# Patient Record
Sex: MALE | Race: WHITE | NOT HISPANIC OR LATINO | Employment: OTHER | URBAN - METROPOLITAN AREA
[De-identification: names, ages, dates, MRNs, and addresses within clinical notes are randomized per-mention and may not be internally consistent; named-entity substitution may affect disease eponyms.]

---

## 2018-01-24 ENCOUNTER — OFFICE VISIT CONVERTED (OUTPATIENT)
Dept: FAMILY MEDICINE CLINIC | Facility: CLINIC | Age: 68
End: 2018-01-24
Attending: NURSE PRACTITIONER

## 2018-02-02 ENCOUNTER — OFFICE VISIT CONVERTED (OUTPATIENT)
Dept: FAMILY MEDICINE CLINIC | Facility: CLINIC | Age: 68
End: 2018-02-02
Attending: FAMILY MEDICINE

## 2018-03-09 ENCOUNTER — OFFICE VISIT CONVERTED (OUTPATIENT)
Dept: FAMILY MEDICINE CLINIC | Facility: CLINIC | Age: 68
End: 2018-03-09
Attending: FAMILY MEDICINE

## 2018-04-03 ENCOUNTER — OFFICE VISIT CONVERTED (OUTPATIENT)
Dept: NEUROSURGERY | Facility: CLINIC | Age: 68
End: 2018-04-03
Attending: PHYSICIAN ASSISTANT

## 2018-05-01 ENCOUNTER — OFFICE VISIT CONVERTED (OUTPATIENT)
Dept: NEUROLOGY | Facility: CLINIC | Age: 68
End: 2018-05-01
Attending: PSYCHIATRY & NEUROLOGY

## 2018-05-10 ENCOUNTER — OFFICE VISIT CONVERTED (OUTPATIENT)
Dept: NEUROSURGERY | Facility: CLINIC | Age: 68
End: 2018-05-10
Attending: PHYSICIAN ASSISTANT

## 2018-05-10 ENCOUNTER — CONVERSION ENCOUNTER (OUTPATIENT)
Dept: NEUROLOGY | Facility: CLINIC | Age: 68
End: 2018-05-10

## 2018-07-11 ENCOUNTER — OFFICE VISIT CONVERTED (OUTPATIENT)
Dept: PULMONOLOGY | Facility: CLINIC | Age: 68
End: 2018-07-11
Attending: INTERNAL MEDICINE

## 2018-07-13 ENCOUNTER — OFFICE VISIT CONVERTED (OUTPATIENT)
Dept: FAMILY MEDICINE CLINIC | Facility: CLINIC | Age: 68
End: 2018-07-13
Attending: NURSE PRACTITIONER

## 2018-07-13 ENCOUNTER — CONVERSION ENCOUNTER (OUTPATIENT)
Dept: FAMILY MEDICINE CLINIC | Facility: CLINIC | Age: 68
End: 2018-07-13

## 2018-09-05 ENCOUNTER — OFFICE VISIT CONVERTED (OUTPATIENT)
Dept: PULMONOLOGY | Facility: CLINIC | Age: 68
End: 2018-09-05
Attending: INTERNAL MEDICINE

## 2018-10-15 ENCOUNTER — CONVERSION ENCOUNTER (OUTPATIENT)
Dept: FAMILY MEDICINE CLINIC | Facility: CLINIC | Age: 68
End: 2018-10-15

## 2018-10-15 ENCOUNTER — OFFICE VISIT CONVERTED (OUTPATIENT)
Dept: FAMILY MEDICINE CLINIC | Facility: CLINIC | Age: 68
End: 2018-10-15
Attending: NURSE PRACTITIONER

## 2018-10-18 ENCOUNTER — OFFICE VISIT CONVERTED (OUTPATIENT)
Dept: NEUROSURGERY | Facility: CLINIC | Age: 68
End: 2018-10-18
Attending: PHYSICIAN ASSISTANT

## 2018-12-19 ENCOUNTER — OFFICE VISIT CONVERTED (OUTPATIENT)
Dept: PULMONOLOGY | Facility: CLINIC | Age: 68
End: 2018-12-19
Attending: INTERNAL MEDICINE

## 2019-01-14 ENCOUNTER — HOSPITAL ENCOUNTER (OUTPATIENT)
Dept: LAB | Facility: HOSPITAL | Age: 69
Discharge: HOME | End: 2019-01-14
Attending: INTERNAL MEDICINE

## 2019-01-14 LAB
ALBUMIN SERPL-MCNC: 4.4 G/DL (ref 3.5–5)
ALBUMIN/GLOB SERPL: 1.3 {RATIO} (ref 1.4–2.6)
ALP SERPL-CCNC: 74 U/L (ref 56–155)
ALT SERPL-CCNC: 15 U/L (ref 10–40)
ANION GAP SERPL CALC-SCNC: 17 MMOL/L (ref 8–19)
AST SERPL-CCNC: 20 U/L (ref 15–50)
BILIRUB SERPL-MCNC: 0.29 MG/DL (ref 0.2–1.3)
BUN SERPL-MCNC: 10 MG/DL (ref 5–25)
BUN/CREAT SERPL: 11 {RATIO} (ref 6–20)
CALCIUM SERPL-MCNC: 9.6 MG/DL (ref 8.7–10.4)
CHLORIDE SERPL-SCNC: 88 MMOL/L (ref 99–111)
CHOLEST SERPL-MCNC: 197 MG/DL (ref 107–200)
CHOLEST/HDLC SERPL: 5.1 {RATIO} (ref 3–6)
CONV CO2: 25 MMOL/L (ref 22–32)
CONV TOTAL PROTEIN: 7.9 G/DL (ref 6.3–8.2)
CREAT UR-MCNC: 0.94 MG/DL (ref 0.7–1.2)
GFR SERPLBLD BASED ON 1.73 SQ M-ARVRAT: >60 ML/MIN/{1.73_M2}
GLOBULIN UR ELPH-MCNC: 3.5 G/DL (ref 2–3.5)
GLUCOSE SERPL-MCNC: 88 MG/DL (ref 70–99)
HDLC SERPL-MCNC: 39 MG/DL (ref 40–60)
LDLC SERPL CALC-MCNC: 129 MG/DL (ref 70–100)
OSMOLALITY SERPL CALC.SUM OF ELEC: 258 MOSM/KG (ref 273–304)
POTASSIUM SERPL-SCNC: 4.8 MMOL/L (ref 3.5–5.3)
SODIUM SERPL-SCNC: 125 MMOL/L (ref 135–147)
TRIGL SERPL-MCNC: 146 MG/DL (ref 40–150)
VLDLC SERPL-MCNC: 29 MG/DL (ref 5–37)

## 2019-01-16 ENCOUNTER — CONVERSION ENCOUNTER (OUTPATIENT)
Dept: FAMILY MEDICINE CLINIC | Facility: CLINIC | Age: 69
End: 2019-01-16

## 2019-01-16 ENCOUNTER — OFFICE VISIT CONVERTED (OUTPATIENT)
Dept: FAMILY MEDICINE CLINIC | Facility: CLINIC | Age: 69
End: 2019-01-16
Attending: NURSE PRACTITIONER

## 2019-03-14 ENCOUNTER — HOSPITAL ENCOUNTER (OUTPATIENT)
Dept: LAB | Facility: HOSPITAL | Age: 69
Discharge: HOME | End: 2019-03-14
Attending: NURSE PRACTITIONER

## 2019-03-14 ENCOUNTER — CONVERSION ENCOUNTER (OUTPATIENT)
Dept: FAMILY MEDICINE CLINIC | Facility: CLINIC | Age: 69
End: 2019-03-14

## 2019-03-14 ENCOUNTER — OFFICE VISIT CONVERTED (OUTPATIENT)
Dept: FAMILY MEDICINE CLINIC | Facility: CLINIC | Age: 69
End: 2019-03-14
Attending: NURSE PRACTITIONER

## 2019-03-14 LAB
25(OH)D3 SERPL-MCNC: 67.9 NG/ML (ref 30–100)
ALBUMIN SERPL-MCNC: 4.4 G/DL (ref 3.5–5)
ALBUMIN/GLOB SERPL: 1.2 {RATIO} (ref 1.4–2.6)
ALP SERPL-CCNC: 81 U/L (ref 56–155)
ALT SERPL-CCNC: 17 U/L (ref 10–40)
ANION GAP SERPL CALC-SCNC: 19 MMOL/L (ref 8–19)
AST SERPL-CCNC: 22 U/L (ref 15–50)
BASOPHILS # BLD AUTO: 0.09 10*3/UL (ref 0–0.2)
BASOPHILS NFR BLD AUTO: 0.7 % (ref 0–3)
BILIRUB SERPL-MCNC: 0.33 MG/DL (ref 0.2–1.3)
BUN SERPL-MCNC: 15 MG/DL (ref 5–25)
BUN/CREAT SERPL: 13 {RATIO} (ref 6–20)
CALCIUM SERPL-MCNC: 9.4 MG/DL (ref 8.7–10.4)
CHLORIDE SERPL-SCNC: 84 MMOL/L (ref 99–111)
CHOLEST SERPL-MCNC: 221 MG/DL (ref 107–200)
CHOLEST/HDLC SERPL: 6.5 {RATIO} (ref 3–6)
CONV ABS IMM GRAN: 0.09 10*3/UL (ref 0–0.2)
CONV CO2: 22 MMOL/L (ref 22–32)
CONV IMMATURE GRAN: 0.7 % (ref 0–1.8)
CONV TOTAL PROTEIN: 8 G/DL (ref 6.3–8.2)
CREAT UR-MCNC: 1.15 MG/DL (ref 0.7–1.2)
DEPRECATED RDW RBC AUTO: 42.8 FL (ref 35.1–43.9)
EOSINOPHIL # BLD AUTO: 0.56 10*3/UL (ref 0–0.7)
EOSINOPHIL # BLD AUTO: 4.1 % (ref 0–7)
ERYTHROCYTE [DISTWIDTH] IN BLOOD BY AUTOMATED COUNT: 13.1 % (ref 11.6–14.4)
EST. AVERAGE GLUCOSE BLD GHB EST-MCNC: 114 MG/DL
FOLATE SERPL-MCNC: 4.8 NG/ML (ref 4.8–20)
GFR SERPLBLD BASED ON 1.73 SQ M-ARVRAT: >60 ML/MIN/{1.73_M2}
GLOBULIN UR ELPH-MCNC: 3.6 G/DL (ref 2–3.5)
GLUCOSE SERPL-MCNC: 97 MG/DL (ref 70–99)
HBA1C MFR BLD: 14.7 G/DL (ref 14–18)
HBA1C MFR BLD: 5.6 % (ref 3.5–5.7)
HCT VFR BLD AUTO: 42.3 % (ref 42–52)
HDLC SERPL-MCNC: 34 MG/DL (ref 40–60)
LDLC SERPL CALC-MCNC: 124 MG/DL (ref 70–100)
LYMPHOCYTES # BLD AUTO: 1.98 10*3/UL (ref 1–5)
MCH RBC QN AUTO: 30.9 PG (ref 27–31)
MCHC RBC AUTO-ENTMCNC: 34.8 G/DL (ref 33–37)
MCV RBC AUTO: 88.9 FL (ref 80–96)
MONOCYTES # BLD AUTO: 1.46 10*3/UL (ref 0.2–1.2)
MONOCYTES NFR BLD AUTO: 10.6 % (ref 3–10)
NEUTROPHILS # BLD AUTO: 9.55 10*3/UL (ref 2–8)
NEUTROPHILS NFR BLD AUTO: 69.5 % (ref 30–85)
NRBC CBCN: 0 % (ref 0–0.7)
OSMOLALITY SERPL CALC.SUM OF ELEC: 253 MOSM/KG (ref 273–304)
PLATELET # BLD AUTO: 360 10*3/UL (ref 130–400)
PMV BLD AUTO: 8.6 FL (ref 9.4–12.4)
POTASSIUM SERPL-SCNC: 4.3 MMOL/L (ref 3.5–5.3)
RBC # BLD AUTO: 4.76 10*6/UL (ref 4.7–6.1)
SODIUM SERPL-SCNC: 121 MMOL/L (ref 135–147)
TRIGL SERPL-MCNC: 316 MG/DL (ref 40–150)
TSH SERPL-ACNC: 2.59 M[IU]/L (ref 0.27–4.2)
VARIANT LYMPHS NFR BLD MANUAL: 14.4 % (ref 20–45)
VIT B12 SERPL-MCNC: 1464 PG/ML (ref 211–911)
VLDLC SERPL-MCNC: 63 MG/DL (ref 5–37)
WBC # BLD AUTO: 13.73 10*3/UL (ref 4.8–10.8)

## 2019-03-25 ENCOUNTER — HOSPITAL ENCOUNTER (OUTPATIENT)
Dept: LAB | Facility: HOSPITAL | Age: 69
Discharge: HOME | End: 2019-03-25
Attending: INTERNAL MEDICINE

## 2019-03-25 LAB
ANION GAP SERPL CALC-SCNC: 20 MMOL/L (ref 8–19)
BUN SERPL-MCNC: 13 MG/DL (ref 5–25)
BUN/CREAT SERPL: 13 {RATIO} (ref 6–20)
CALCIUM SERPL-MCNC: 9.4 MG/DL (ref 8.7–10.4)
CHLORIDE SERPL-SCNC: 99 MMOL/L (ref 99–111)
CONV CO2: 23 MMOL/L (ref 22–32)
CREAT UR-MCNC: 1.04 MG/DL (ref 0.7–1.2)
GFR SERPLBLD BASED ON 1.73 SQ M-ARVRAT: >60 ML/MIN/{1.73_M2}
GLUCOSE SERPL-MCNC: 102 MG/DL (ref 70–99)
OSMOLALITY SERPL CALC.SUM OF ELEC: 284 MOSM/KG (ref 273–304)
POTASSIUM SERPL-SCNC: 4.5 MMOL/L (ref 3.5–5.3)
SODIUM SERPL-SCNC: 137 MMOL/L (ref 135–147)

## 2019-04-03 ENCOUNTER — OFFICE VISIT CONVERTED (OUTPATIENT)
Dept: FAMILY MEDICINE CLINIC | Facility: CLINIC | Age: 69
End: 2019-04-03
Attending: NURSE PRACTITIONER

## 2019-04-16 ENCOUNTER — OFFICE VISIT CONVERTED (OUTPATIENT)
Dept: FAMILY MEDICINE CLINIC | Facility: CLINIC | Age: 69
End: 2019-04-16
Attending: NURSE PRACTITIONER

## 2019-04-16 ENCOUNTER — CONVERSION ENCOUNTER (OUTPATIENT)
Dept: FAMILY MEDICINE CLINIC | Facility: CLINIC | Age: 69
End: 2019-04-16

## 2019-06-11 ENCOUNTER — OFFICE VISIT CONVERTED (OUTPATIENT)
Dept: PULMONOLOGY | Facility: CLINIC | Age: 69
End: 2019-06-11
Attending: PHYSICIAN ASSISTANT

## 2019-06-14 ENCOUNTER — HOSPITAL ENCOUNTER (OUTPATIENT)
Dept: LAB | Facility: HOSPITAL | Age: 69
Discharge: HOME | End: 2019-06-14
Attending: INTERNAL MEDICINE

## 2019-06-14 LAB
ANION GAP SERPL CALC-SCNC: 17 MMOL/L (ref 8–19)
BUN SERPL-MCNC: 14 MG/DL (ref 5–25)
BUN/CREAT SERPL: 11 {RATIO} (ref 6–20)
CALCIUM SERPL-MCNC: 9.4 MG/DL (ref 8.7–10.4)
CHLORIDE SERPL-SCNC: 99 MMOL/L (ref 99–111)
CONV CO2: 26 MMOL/L (ref 22–32)
CREAT UR-MCNC: 1.25 MG/DL (ref 0.7–1.2)
GFR SERPLBLD BASED ON 1.73 SQ M-ARVRAT: 59 ML/MIN/{1.73_M2}
GLUCOSE SERPL-MCNC: 107 MG/DL (ref 70–99)
OSMOLALITY SERPL CALC.SUM OF ELEC: 285 MOSM/KG (ref 273–304)
POTASSIUM SERPL-SCNC: 4.5 MMOL/L (ref 3.5–5.3)
SODIUM SERPL-SCNC: 137 MMOL/L (ref 135–147)

## 2019-06-27 ENCOUNTER — HOSPITAL ENCOUNTER (OUTPATIENT)
Dept: CARDIOLOGY | Facility: HOSPITAL | Age: 69
Discharge: HOME | End: 2019-06-27
Attending: PHYSICIAN ASSISTANT

## 2019-07-29 ENCOUNTER — OFFICE VISIT CONVERTED (OUTPATIENT)
Dept: FAMILY MEDICINE CLINIC | Facility: CLINIC | Age: 69
End: 2019-07-29
Attending: NURSE PRACTITIONER

## 2019-08-07 ENCOUNTER — OFFICE VISIT CONVERTED (OUTPATIENT)
Dept: PULMONOLOGY | Facility: CLINIC | Age: 69
End: 2019-08-07
Attending: INTERNAL MEDICINE

## 2019-08-30 ENCOUNTER — PATIENT OUTREACH - CONVERTED (OUTPATIENT)
Dept: FAMILY MEDICINE CLINIC | Facility: CLINIC | Age: 69
End: 2019-08-30
Attending: NURSE PRACTITIONER

## 2019-09-06 ENCOUNTER — HOSPITAL ENCOUNTER (OUTPATIENT)
Dept: PULMONOLOGY | Facility: CLINIC | Age: 69
Setting detail: RECURRING SERIES
Discharge: HOME | End: 2019-11-19
Attending: INTERNAL MEDICINE

## 2019-09-30 ENCOUNTER — PATIENT OUTREACH - CONVERTED (OUTPATIENT)
Dept: FAMILY MEDICINE CLINIC | Facility: CLINIC | Age: 69
End: 2019-09-30
Attending: NURSE PRACTITIONER

## 2019-10-08 ENCOUNTER — HOSPITAL ENCOUNTER (OUTPATIENT)
Dept: LAB | Facility: HOSPITAL | Age: 69
Discharge: HOME | End: 2019-10-08
Attending: NURSE PRACTITIONER

## 2019-10-08 ENCOUNTER — HOSPITAL ENCOUNTER (OUTPATIENT)
Dept: CT IMAGING | Facility: HOSPITAL | Age: 69
Discharge: HOME | End: 2019-10-08
Attending: INTERNAL MEDICINE

## 2019-10-08 LAB
25(OH)D3 SERPL-MCNC: 57.6 NG/ML (ref 30–100)
ALBUMIN SERPL-MCNC: 4.5 G/DL (ref 3.5–5)
ALBUMIN/GLOB SERPL: 1.4 {RATIO} (ref 1.4–2.6)
ALP SERPL-CCNC: 79 U/L (ref 56–155)
ALT SERPL-CCNC: 14 U/L (ref 10–40)
ANION GAP SERPL CALC-SCNC: 19 MMOL/L (ref 8–19)
AST SERPL-CCNC: 16 U/L (ref 15–50)
BASOPHILS # BLD AUTO: 0.05 10*3/UL (ref 0–0.2)
BASOPHILS NFR BLD AUTO: 0.6 % (ref 0–3)
BILIRUB SERPL-MCNC: 0.5 MG/DL (ref 0.2–1.3)
BUN SERPL-MCNC: 13 MG/DL (ref 5–25)
BUN/CREAT SERPL: 12 {RATIO} (ref 6–20)
CALCIUM SERPL-MCNC: 9.7 MG/DL (ref 8.7–10.4)
CHLORIDE SERPL-SCNC: 95 MMOL/L (ref 99–111)
CHOLEST SERPL-MCNC: 202 MG/DL (ref 107–200)
CHOLEST/HDLC SERPL: 5.8 {RATIO} (ref 3–6)
CONV ABS IMM GRAN: 0.03 10*3/UL (ref 0–0.2)
CONV CO2: 21 MMOL/L (ref 22–32)
CONV IMMATURE GRAN: 0.3 % (ref 0–1.8)
CONV TOTAL PROTEIN: 7.8 G/DL (ref 6.3–8.2)
CREAT UR-MCNC: 1.1 MG/DL (ref 0.7–1.2)
DEPRECATED RDW RBC AUTO: 43.4 FL (ref 35.1–43.9)
EOSINOPHIL # BLD AUTO: 0.21 10*3/UL (ref 0–0.7)
EOSINOPHIL # BLD AUTO: 2.4 % (ref 0–7)
ERYTHROCYTE [DISTWIDTH] IN BLOOD BY AUTOMATED COUNT: 13 % (ref 11.6–14.4)
GFR SERPLBLD BASED ON 1.73 SQ M-ARVRAT: >60 ML/MIN/{1.73_M2}
GLOBULIN UR ELPH-MCNC: 3.3 G/DL (ref 2–3.5)
GLUCOSE SERPL-MCNC: 91 MG/DL (ref 70–99)
HCT VFR BLD AUTO: 43.6 % (ref 42–52)
HDLC SERPL-MCNC: 35 MG/DL (ref 40–60)
HGB BLD-MCNC: 14.5 G/DL (ref 14–18)
LDLC SERPL CALC-MCNC: 142 MG/DL (ref 70–100)
LYMPHOCYTES # BLD AUTO: 1.26 10*3/UL (ref 1–5)
LYMPHOCYTES NFR BLD AUTO: 14.6 % (ref 20–45)
MCH RBC QN AUTO: 30.3 PG (ref 27–31)
MCHC RBC AUTO-ENTMCNC: 33.3 G/DL (ref 33–37)
MCV RBC AUTO: 91 FL (ref 80–96)
MONOCYTES # BLD AUTO: 0.7 10*3/UL (ref 0.2–1.2)
MONOCYTES NFR BLD AUTO: 8.1 % (ref 3–10)
NEUTROPHILS # BLD AUTO: 6.39 10*3/UL (ref 2–8)
NEUTROPHILS NFR BLD AUTO: 74 % (ref 30–85)
NRBC CBCN: 0 % (ref 0–0.7)
OSMOLALITY SERPL CALC.SUM OF ELEC: 272 MOSM/KG (ref 273–304)
PLATELET # BLD AUTO: 277 10*3/UL (ref 130–400)
PMV BLD AUTO: 9.3 FL (ref 9.4–12.4)
POTASSIUM SERPL-SCNC: 4.1 MMOL/L (ref 3.5–5.3)
RBC # BLD AUTO: 4.79 10*6/UL (ref 4.7–6.1)
SODIUM SERPL-SCNC: 131 MMOL/L (ref 135–147)
TRIGL SERPL-MCNC: 126 MG/DL (ref 40–150)
VLDLC SERPL-MCNC: 25 MG/DL (ref 5–37)
WBC # BLD AUTO: 8.64 10*3/UL (ref 4.8–10.8)

## 2019-10-09 LAB
APPEARANCE UR: CLEAR
BILIRUB UR QL: NEGATIVE
COLOR UR: YELLOW
CONV COLLECTION SOURCE (UA): NORMAL
CONV UROBILINOGEN IN URINE BY AUTOMATED TEST STRIP: 0.2 {EHRLICHU}/DL (ref 0.1–1)
GLUCOSE UR QL: NEGATIVE MG/DL
HGB UR QL STRIP: NEGATIVE
KETONES UR QL STRIP: NEGATIVE MG/DL
LEUKOCYTE ESTERASE UR QL STRIP: NEGATIVE
NITRITE UR QL STRIP: NEGATIVE
PH UR STRIP.AUTO: 7.5 [PH] (ref 5–8)
PROT UR QL: NEGATIVE MG/DL
SP GR UR: 1.01 (ref 1–1.03)

## 2019-10-14 ENCOUNTER — CONVERSION ENCOUNTER (OUTPATIENT)
Dept: ORTHOPEDIC SURGERY | Facility: CLINIC | Age: 69
End: 2019-10-14

## 2019-10-14 ENCOUNTER — OFFICE VISIT CONVERTED (OUTPATIENT)
Dept: ORTHOPEDIC SURGERY | Facility: CLINIC | Age: 69
End: 2019-10-14
Attending: ORTHOPAEDIC SURGERY

## 2019-10-18 ENCOUNTER — HOSPITAL ENCOUNTER (OUTPATIENT)
Dept: GASTROENTEROLOGY | Facility: HOSPITAL | Age: 69
Setting detail: HOSPITAL OUTPATIENT SURGERY
Discharge: HOME | End: 2019-10-18
Attending: INTERNAL MEDICINE

## 2019-10-18 LAB
EOSINOPHIL NFR FLD MANUAL: 8 %
EPI CELLS NFR FLD: 0 %
LYMPHOCYTES NFR FLD MANUAL: 26 %
MACROPHAGE FLUID: 24 /100{WBCS}
NEUTROPHILS NFR FLD MANUAL: 42 %
VISUAL PRESENCE OF BLOOD: NORMAL

## 2019-10-22 ENCOUNTER — HOSPITAL ENCOUNTER (OUTPATIENT)
Dept: LAB | Facility: HOSPITAL | Age: 69
Discharge: HOME | End: 2019-10-22
Attending: NURSE PRACTITIONER

## 2019-10-22 LAB
ALBUMIN SERPL-MCNC: 4.3 G/DL (ref 3.5–5)
ALBUMIN/GLOB SERPL: 1.4 {RATIO} (ref 1.4–2.6)
ALP SERPL-CCNC: 76 U/L (ref 56–155)
ALT SERPL-CCNC: 13 U/L (ref 10–40)
ANION GAP SERPL CALC-SCNC: 18 MMOL/L (ref 8–19)
AST SERPL-CCNC: 15 U/L (ref 15–50)
BACTERIA SPEC AEROBE CULT: ABNORMAL
BILIRUB SERPL-MCNC: 0.35 MG/DL (ref 0.2–1.3)
BUN SERPL-MCNC: 11 MG/DL (ref 5–25)
BUN/CREAT SERPL: 10 {RATIO} (ref 6–20)
CALCIUM SERPL-MCNC: 9.4 MG/DL (ref 8.7–10.4)
CHLORIDE SERPL-SCNC: 98 MMOL/L (ref 99–111)
CIPROFLOXACIN SUSC ISLT: <=0.5
CLINDAMYCIN SUSC ISLT: 0.25
CONV BRONCHIAL WASH CULTURE: ABNORMAL
CONV CO2: 22 MMOL/L (ref 22–32)
CONV TOTAL PROTEIN: 7.4 G/DL (ref 6.3–8.2)
CREAT UR-MCNC: 1.07 MG/DL (ref 0.7–1.2)
DOXYCYCLINE SUSC ISLT: <=0.5
ERYTHROMYCIN SUSC ISLT: >=8
GENTAMICIN SUSC ISLT: <=0.5
GFR SERPLBLD BASED ON 1.73 SQ M-ARVRAT: >60 ML/MIN/{1.73_M2}
GLOBULIN UR ELPH-MCNC: 3.1 G/DL (ref 2–3.5)
GLUCOSE SERPL-MCNC: 99 MG/DL (ref 70–99)
LEVOFLOXACIN SUSC ISLT: 0.25
OSMOLALITY SERPL CALC.SUM OF ELEC: 277 MOSM/KG (ref 273–304)
OXACILLIN SUSC ISLT: 1
POTASSIUM SERPL-SCNC: 3.9 MMOL/L (ref 3.5–5.3)
RIFAMPIN SUSC ISLT: <=0.5
SODIUM SERPL-SCNC: 134 MMOL/L (ref 135–147)
TETRACYCLINE SUSC ISLT: <=1
TMP SMX SUSC ISLT: <=10
VANCOMYCIN SUSC ISLT: 1

## 2019-10-23 ENCOUNTER — OFFICE VISIT CONVERTED (OUTPATIENT)
Dept: FAMILY MEDICINE CLINIC | Facility: CLINIC | Age: 69
End: 2019-10-23
Attending: NURSE PRACTITIONER

## 2019-10-23 LAB
CONV ADENOVIRUS  (BAL OR WASH): NEGATIVE
FLUAV RNA SPEC QL NAA+PROBE: NEGATIVE
FLUBV RNA ISLT QL NAA+PROBE: NEGATIVE
HMPV RNA SPEC QL NAA+PROBE: NEGATIVE
HPIV1 RNA ISLT QL NAA+PROBE: NEGATIVE
HPIV2 SPEC QL CULT: NEGATIVE
HPIV3 SPEC QL CULT: NEGATIVE
RHINOVIRUS RNA SPEC QL NAA+PROBE: NEGATIVE
RSV A: NEGATIVE
RSV B RNA SPEC QL NAA+PROBE: NEGATIVE

## 2019-10-29 ENCOUNTER — HOSPITAL ENCOUNTER (OUTPATIENT)
Dept: PREADMISSION TESTING | Facility: HOSPITAL | Age: 69
Discharge: HOME | End: 2019-10-29
Attending: ORTHOPAEDIC SURGERY

## 2019-10-29 ENCOUNTER — PATIENT OUTREACH - CONVERTED (OUTPATIENT)
Dept: FAMILY MEDICINE CLINIC | Facility: CLINIC | Age: 69
End: 2019-10-29
Attending: NURSE PRACTITIONER

## 2019-10-29 LAB
ALBUMIN SERPL-MCNC: 4.7 G/DL (ref 3.5–5)
ALBUMIN/GLOB SERPL: 1.5 {RATIO} (ref 1.4–2.6)
ALP SERPL-CCNC: 85 U/L (ref 56–155)
ALT SERPL-CCNC: 23 U/L (ref 10–40)
ANION GAP SERPL CALC-SCNC: 17 MMOL/L (ref 8–19)
APTT BLD: 28.8 S (ref 22.2–34.2)
AST SERPL-CCNC: 23 U/L (ref 15–50)
BASOPHILS # BLD AUTO: 0.05 10*3/UL (ref 0–0.2)
BASOPHILS NFR BLD AUTO: 0.5 % (ref 0–3)
BILIRUB SERPL-MCNC: 0.43 MG/DL (ref 0.2–1.3)
BUN SERPL-MCNC: 13 MG/DL (ref 5–25)
BUN/CREAT SERPL: 14 {RATIO} (ref 6–20)
CALCIUM SERPL-MCNC: 9.6 MG/DL (ref 8.7–10.4)
CHLORIDE SERPL-SCNC: 95 MMOL/L (ref 99–111)
CONV ABS IMM GRAN: 0.06 10*3/UL (ref 0–0.2)
CONV CO2: 23 MMOL/L (ref 22–32)
CONV IMMATURE GRAN: 0.6 % (ref 0–1.8)
CONV TOTAL PROTEIN: 7.9 G/DL (ref 6.3–8.2)
CREAT UR-MCNC: 0.9 MG/DL (ref 0.7–1.2)
DEPRECATED RDW RBC AUTO: 43 FL (ref 35.1–43.9)
EOSINOPHIL # BLD AUTO: 0.21 10*3/UL (ref 0–0.7)
EOSINOPHIL # BLD AUTO: 2.1 % (ref 0–7)
ERYTHROCYTE [DISTWIDTH] IN BLOOD BY AUTOMATED COUNT: 13 % (ref 11.6–14.4)
EST. AVERAGE GLUCOSE BLD GHB EST-MCNC: 111 MG/DL
GFR SERPLBLD BASED ON 1.73 SQ M-ARVRAT: >60 ML/MIN/{1.73_M2}
GLOBULIN UR ELPH-MCNC: 3.2 G/DL (ref 2–3.5)
GLUCOSE SERPL-MCNC: 109 MG/DL (ref 70–99)
HBA1C MFR BLD: 5.5 % (ref 3.5–5.7)
HCT VFR BLD AUTO: 46.1 % (ref 42–52)
HGB BLD-MCNC: 15.9 G/DL (ref 14–18)
INR PPP: 0.96 (ref 2–3)
LYMPHOCYTES # BLD AUTO: 1.73 10*3/UL (ref 1–5)
LYMPHOCYTES NFR BLD AUTO: 17.7 % (ref 20–45)
MCH RBC QN AUTO: 31 PG (ref 27–31)
MCHC RBC AUTO-ENTMCNC: 34.5 G/DL (ref 33–37)
MCV RBC AUTO: 89.9 FL (ref 80–96)
MONOCYTES # BLD AUTO: 1.02 10*3/UL (ref 0.2–1.2)
MONOCYTES NFR BLD AUTO: 10.4 % (ref 3–10)
NEUTROPHILS # BLD AUTO: 6.71 10*3/UL (ref 2–8)
NEUTROPHILS NFR BLD AUTO: 68.7 % (ref 30–85)
NRBC CBCN: 0 % (ref 0–0.7)
OSMOLALITY SERPL CALC.SUM OF ELEC: 273 MOSM/KG (ref 273–304)
PLATELET # BLD AUTO: 305 10*3/UL (ref 130–400)
PMV BLD AUTO: 8.9 FL (ref 9.4–12.4)
POTASSIUM SERPL-SCNC: 4.2 MMOL/L (ref 3.5–5.3)
PROTHROMBIN TIME: 10.4 S (ref 9.4–12)
RBC # BLD AUTO: 5.13 10*6/UL (ref 4.7–6.1)
SODIUM SERPL-SCNC: 131 MMOL/L (ref 135–147)
WBC # BLD AUTO: 9.78 10*3/UL (ref 4.8–10.8)

## 2019-10-30 LAB
CONV COMMENT: NORMAL
CONV LEGIONELLA CULTURE: NORMAL

## 2019-11-22 ENCOUNTER — PATIENT OUTREACH - CONVERTED (OUTPATIENT)
Dept: FAMILY MEDICINE CLINIC | Facility: CLINIC | Age: 69
End: 2019-11-22
Attending: NURSE PRACTITIONER

## 2019-11-22 ENCOUNTER — HOSPITAL ENCOUNTER (OUTPATIENT)
Dept: PERIOP | Facility: HOSPITAL | Age: 69
Setting detail: HOSPITAL OUTPATIENT SURGERY
Discharge: HOME | End: 2019-11-23
Attending: FAMILY MEDICINE

## 2019-11-23 LAB
ANION GAP SERPL CALC-SCNC: 15 MMOL/L (ref 8–19)
BUN SERPL-MCNC: 13 MG/DL (ref 5–25)
BUN/CREAT SERPL: 14 {RATIO} (ref 6–20)
CALCIUM SERPL-MCNC: 8.8 MG/DL (ref 8.7–10.4)
CHLORIDE SERPL-SCNC: 99 MMOL/L (ref 99–111)
CONV CO2: 22 MMOL/L (ref 22–32)
CREAT UR-MCNC: 0.94 MG/DL (ref 0.7–1.2)
GFR SERPLBLD BASED ON 1.73 SQ M-ARVRAT: >60 ML/MIN/{1.73_M2}
GLUCOSE SERPL-MCNC: 113 MG/DL (ref 70–99)
HCT VFR BLD AUTO: 37.6 % (ref 42–52)
HGB BLD-MCNC: 12.2 G/DL (ref 14–18)
OSMOLALITY SERPL CALC.SUM OF ELEC: 275 MOSM/KG (ref 273–304)
POTASSIUM SERPL-SCNC: 4.4 MMOL/L (ref 3.5–5.3)
SODIUM SERPL-SCNC: 132 MMOL/L (ref 135–147)

## 2019-11-25 ENCOUNTER — HOSPITAL ENCOUNTER (OUTPATIENT)
Dept: PHYSICAL THERAPY | Facility: CLINIC | Age: 69
Setting detail: RECURRING SERIES
Discharge: STILL ADMITTED OR ADMINISTRATIVE | End: 2020-03-05
Attending: ORTHOPAEDIC SURGERY

## 2019-12-06 ENCOUNTER — OFFICE VISIT CONVERTED (OUTPATIENT)
Dept: ORTHOPEDIC SURGERY | Facility: CLINIC | Age: 69
End: 2019-12-06
Attending: PHYSICIAN ASSISTANT

## 2019-12-23 ENCOUNTER — HOSPITAL ENCOUNTER (OUTPATIENT)
Dept: URGENT CARE | Facility: CLINIC | Age: 69
Discharge: HOME | End: 2019-12-23

## 2019-12-27 ENCOUNTER — PATIENT OUTREACH - CONVERTED (OUTPATIENT)
Dept: FAMILY MEDICINE CLINIC | Facility: CLINIC | Age: 69
End: 2019-12-27
Attending: NURSE PRACTITIONER

## 2020-01-06 ENCOUNTER — OFFICE VISIT CONVERTED (OUTPATIENT)
Dept: ORTHOPEDIC SURGERY | Facility: CLINIC | Age: 70
End: 2020-01-06
Attending: PHYSICIAN ASSISTANT

## 2020-01-06 ENCOUNTER — CONVERSION ENCOUNTER (OUTPATIENT)
Dept: ORTHOPEDIC SURGERY | Facility: CLINIC | Age: 70
End: 2020-01-06

## 2020-01-14 ENCOUNTER — HOSPITAL ENCOUNTER (OUTPATIENT)
Dept: CT IMAGING | Facility: HOSPITAL | Age: 70
Discharge: HOME | End: 2020-01-14
Attending: INTERNAL MEDICINE

## 2020-01-21 ENCOUNTER — OFFICE VISIT CONVERTED (OUTPATIENT)
Dept: PULMONOLOGY | Facility: CLINIC | Age: 70
End: 2020-01-21
Attending: INTERNAL MEDICINE

## 2020-01-23 ENCOUNTER — HOSPITAL ENCOUNTER (OUTPATIENT)
Dept: GENERAL RADIOLOGY | Facility: HOSPITAL | Age: 70
Discharge: HOME | End: 2020-01-23
Attending: INTERNAL MEDICINE

## 2020-01-23 LAB
CREAT BLD-MCNC: 1.1 MG/DL (ref 0.6–1.4)
GFR SERPLBLD BASED ON 1.73 SQ M-ARVRAT: >60 ML/MIN/{1.73_M2}

## 2020-01-29 ENCOUNTER — OFFICE VISIT CONVERTED (OUTPATIENT)
Dept: FAMILY MEDICINE CLINIC | Facility: CLINIC | Age: 70
End: 2020-01-29
Attending: NURSE PRACTITIONER

## 2020-01-29 ENCOUNTER — CONVERSION ENCOUNTER (OUTPATIENT)
Dept: FAMILY MEDICINE CLINIC | Facility: CLINIC | Age: 70
End: 2020-01-29

## 2020-02-17 ENCOUNTER — OFFICE VISIT CONVERTED (OUTPATIENT)
Dept: ORTHOPEDIC SURGERY | Facility: CLINIC | Age: 70
End: 2020-02-17
Attending: PHYSICIAN ASSISTANT

## 2020-02-27 ENCOUNTER — HOSPITAL ENCOUNTER (OUTPATIENT)
Dept: LAB | Facility: HOSPITAL | Age: 70
Discharge: HOME | End: 2020-02-27
Attending: NURSE PRACTITIONER

## 2020-02-27 LAB
ALBUMIN SERPL-MCNC: 4.5 G/DL (ref 3.5–5)
ALBUMIN/GLOB SERPL: 1.2 {RATIO} (ref 1.4–2.6)
ALP SERPL-CCNC: 80 U/L (ref 56–155)
ALT SERPL-CCNC: 13 U/L (ref 10–40)
ANION GAP SERPL CALC-SCNC: 21 MMOL/L (ref 8–19)
APPEARANCE UR: CLEAR
AST SERPL-CCNC: 14 U/L (ref 15–50)
BASOPHILS # BLD AUTO: 0.17 10*3/UL (ref 0–0.2)
BASOPHILS NFR BLD AUTO: 1.9 % (ref 0–3)
BILIRUB SERPL-MCNC: 0.18 MG/DL (ref 0.2–1.3)
BILIRUB UR QL: NEGATIVE
BUN SERPL-MCNC: 18 MG/DL (ref 5–25)
BUN/CREAT SERPL: 14 {RATIO} (ref 6–20)
CALCIUM SERPL-MCNC: 9.5 MG/DL (ref 8.7–10.4)
CHLORIDE SERPL-SCNC: 95 MMOL/L (ref 99–111)
CHOLEST SERPL-MCNC: 209 MG/DL (ref 107–200)
CHOLEST/HDLC SERPL: 6 {RATIO} (ref 3–6)
COLOR UR: YELLOW
CONV ABS IMM GRAN: 0.03 10*3/UL (ref 0–0.2)
CONV CO2: 21 MMOL/L (ref 22–32)
CONV COLLECTION SOURCE (UA): NORMAL
CONV IMMATURE GRAN: 0.3 % (ref 0–1.8)
CONV TOTAL PROTEIN: 8.3 G/DL (ref 6.3–8.2)
CONV UROBILINOGEN IN URINE BY AUTOMATED TEST STRIP: 0.2 {EHRLICHU}/DL (ref 0.1–1)
CREAT UR-MCNC: 1.26 MG/DL (ref 0.7–1.2)
DEPRECATED RDW RBC AUTO: 45.4 FL (ref 35.1–43.9)
EOSINOPHIL # BLD AUTO: 0.51 10*3/UL (ref 0–0.7)
EOSINOPHIL # BLD AUTO: 5.7 % (ref 0–7)
ERYTHROCYTE [DISTWIDTH] IN BLOOD BY AUTOMATED COUNT: 13.3 % (ref 11.6–14.4)
GFR SERPLBLD BASED ON 1.73 SQ M-ARVRAT: 59 ML/MIN/{1.73_M2}
GLOBULIN UR ELPH-MCNC: 3.8 G/DL (ref 2–3.5)
GLUCOSE SERPL-MCNC: 95 MG/DL (ref 70–99)
GLUCOSE UR QL: NEGATIVE MG/DL
HCT VFR BLD AUTO: 45.4 % (ref 42–52)
HDLC SERPL-MCNC: 35 MG/DL (ref 40–60)
HGB BLD-MCNC: 14.5 G/DL (ref 14–18)
HGB UR QL STRIP: NEGATIVE
KETONES UR QL STRIP: NEGATIVE MG/DL
LDLC SERPL CALC-MCNC: 138 MG/DL (ref 70–100)
LEUKOCYTE ESTERASE UR QL STRIP: NEGATIVE
LYMPHOCYTES # BLD AUTO: 2.31 10*3/UL (ref 1–5)
LYMPHOCYTES NFR BLD AUTO: 25.9 % (ref 20–45)
MCH RBC QN AUTO: 29.6 PG (ref 27–31)
MCHC RBC AUTO-ENTMCNC: 31.9 G/DL (ref 33–37)
MCV RBC AUTO: 92.7 FL (ref 80–96)
MONOCYTES # BLD AUTO: 0.88 10*3/UL (ref 0.2–1.2)
MONOCYTES NFR BLD AUTO: 9.9 % (ref 3–10)
NEUTROPHILS # BLD AUTO: 5.02 10*3/UL (ref 2–8)
NEUTROPHILS NFR BLD AUTO: 56.3 % (ref 30–85)
NITRITE UR QL STRIP: NEGATIVE
NRBC CBCN: 0 % (ref 0–0.7)
OSMOLALITY SERPL CALC.SUM OF ELEC: 278 MOSM/KG (ref 273–304)
PH UR STRIP.AUTO: 6.5 [PH] (ref 5–8)
PLATELET # BLD AUTO: 321 10*3/UL (ref 130–400)
PMV BLD AUTO: 9.9 FL (ref 9.4–12.4)
POTASSIUM SERPL-SCNC: 4.2 MMOL/L (ref 3.5–5.3)
PROT UR QL: NEGATIVE MG/DL
PSA SERPL-MCNC: 0.85 NG/ML (ref 0–4)
RBC # BLD AUTO: 4.9 10*6/UL (ref 4.7–6.1)
SODIUM SERPL-SCNC: 133 MMOL/L (ref 135–147)
SP GR UR: 1.01 (ref 1–1.03)
T4 FREE SERPL-MCNC: 1.6 NG/DL (ref 0.9–1.8)
TRIGL SERPL-MCNC: 179 MG/DL (ref 40–150)
TSH SERPL-ACNC: 3.2 M[IU]/L (ref 0.27–4.2)
VLDLC SERPL-MCNC: 36 MG/DL (ref 5–37)
WBC # BLD AUTO: 8.92 10*3/UL (ref 4.8–10.8)

## 2020-03-02 ENCOUNTER — OFFICE VISIT CONVERTED (OUTPATIENT)
Dept: FAMILY MEDICINE CLINIC | Facility: CLINIC | Age: 70
End: 2020-03-02
Attending: NURSE PRACTITIONER

## 2020-03-18 ENCOUNTER — HOSPITAL ENCOUNTER (OUTPATIENT)
Dept: PHYSICAL THERAPY | Facility: CLINIC | Age: 70
Setting detail: RECURRING SERIES
Discharge: HOME | End: 2020-07-08
Attending: ORTHOPAEDIC SURGERY

## 2020-03-30 ENCOUNTER — TELEMEDICINE CONVERTED (OUTPATIENT)
Dept: ORTHOPEDIC SURGERY | Facility: CLINIC | Age: 70
End: 2020-03-30
Attending: PHYSICIAN ASSISTANT

## 2020-04-30 ENCOUNTER — HOSPITAL ENCOUNTER (OUTPATIENT)
Dept: LAB | Facility: HOSPITAL | Age: 70
Discharge: HOME | End: 2020-04-30
Attending: NURSE PRACTITIONER

## 2020-04-30 LAB
ALBUMIN SERPL-MCNC: 4.3 G/DL (ref 3.5–5)
ALBUMIN/GLOB SERPL: 1.3 {RATIO} (ref 1.4–2.6)
ALP SERPL-CCNC: 85 U/L (ref 56–155)
ALT SERPL-CCNC: 21 U/L (ref 10–40)
ANION GAP SERPL CALC-SCNC: 23 MMOL/L (ref 8–19)
AST SERPL-CCNC: 20 U/L (ref 15–50)
BILIRUB SERPL-MCNC: 0.24 MG/DL (ref 0.2–1.3)
BUN SERPL-MCNC: 16 MG/DL (ref 5–25)
BUN/CREAT SERPL: 12 {RATIO} (ref 6–20)
CALCIUM SERPL-MCNC: 9.3 MG/DL (ref 8.7–10.4)
CHLORIDE SERPL-SCNC: 96 MMOL/L (ref 99–111)
CONV CO2: 19 MMOL/L (ref 22–32)
CONV TOTAL PROTEIN: 7.6 G/DL (ref 6.3–8.2)
CREAT UR-MCNC: 1.33 MG/DL (ref 0.7–1.2)
GFR SERPLBLD BASED ON 1.73 SQ M-ARVRAT: 55 ML/MIN/{1.73_M2}
GLOBULIN UR ELPH-MCNC: 3.3 G/DL (ref 2–3.5)
GLUCOSE SERPL-MCNC: 126 MG/DL (ref 70–99)
OSMOLALITY SERPL CALC.SUM OF ELEC: 281 MOSM/KG (ref 273–304)
POTASSIUM SERPL-SCNC: 4.1 MMOL/L (ref 3.5–5.3)
SODIUM SERPL-SCNC: 134 MMOL/L (ref 135–147)

## 2020-05-06 ENCOUNTER — HOSPITAL ENCOUNTER (OUTPATIENT)
Dept: GENERAL RADIOLOGY | Facility: HOSPITAL | Age: 70
Discharge: HOME | End: 2020-05-06
Attending: NURSE PRACTITIONER

## 2020-05-19 ENCOUNTER — HOSPITAL ENCOUNTER (OUTPATIENT)
Dept: LAB | Facility: HOSPITAL | Age: 70
Discharge: HOME | End: 2020-05-19
Attending: NURSE PRACTITIONER

## 2020-05-19 LAB
ALBUMIN SERPL-MCNC: 4.3 G/DL (ref 3.5–5)
ALBUMIN/GLOB SERPL: 1.3 {RATIO} (ref 1.4–2.6)
ALP SERPL-CCNC: 83 U/L (ref 56–155)
ALT SERPL-CCNC: 17 U/L (ref 10–40)
ANION GAP SERPL CALC-SCNC: 17 MMOL/L (ref 8–19)
AST SERPL-CCNC: 17 U/L (ref 15–50)
BILIRUB SERPL-MCNC: 0.19 MG/DL (ref 0.2–1.3)
BUN SERPL-MCNC: 15 MG/DL (ref 5–25)
BUN/CREAT SERPL: 11 {RATIO} (ref 6–20)
CALCIUM SERPL-MCNC: 9.4 MG/DL (ref 8.7–10.4)
CHLORIDE SERPL-SCNC: 97 MMOL/L (ref 99–111)
CONV CO2: 23 MMOL/L (ref 22–32)
CONV TOTAL PROTEIN: 7.6 G/DL (ref 6.3–8.2)
CREAT UR-MCNC: 1.31 MG/DL (ref 0.7–1.2)
GFR SERPLBLD BASED ON 1.73 SQ M-ARVRAT: 56 ML/MIN/{1.73_M2}
GLOBULIN UR ELPH-MCNC: 3.3 G/DL (ref 2–3.5)
GLUCOSE SERPL-MCNC: 89 MG/DL (ref 70–99)
OSMOLALITY SERPL CALC.SUM OF ELEC: 274 MOSM/KG (ref 273–304)
POTASSIUM SERPL-SCNC: 4.5 MMOL/L (ref 3.5–5.3)
SODIUM SERPL-SCNC: 132 MMOL/L (ref 135–147)

## 2020-06-02 ENCOUNTER — OFFICE VISIT CONVERTED (OUTPATIENT)
Dept: FAMILY MEDICINE CLINIC | Facility: CLINIC | Age: 70
End: 2020-06-02
Attending: NURSE PRACTITIONER

## 2020-06-05 ENCOUNTER — OFFICE VISIT CONVERTED (OUTPATIENT)
Dept: PULMONOLOGY | Facility: CLINIC | Age: 70
End: 2020-06-05
Attending: INTERNAL MEDICINE

## 2020-06-24 ENCOUNTER — PATIENT OUTREACH - CONVERTED (OUTPATIENT)
Dept: FAMILY MEDICINE CLINIC | Facility: CLINIC | Age: 70
End: 2020-06-24
Attending: NURSE PRACTITIONER

## 2020-07-29 ENCOUNTER — PATIENT OUTREACH - CONVERTED (OUTPATIENT)
Dept: FAMILY MEDICINE CLINIC | Facility: CLINIC | Age: 70
End: 2020-07-29
Attending: NURSE PRACTITIONER

## 2020-09-03 ENCOUNTER — OFFICE VISIT CONVERTED (OUTPATIENT)
Dept: NEUROSURGERY | Facility: CLINIC | Age: 70
End: 2020-09-03
Attending: PHYSICIAN ASSISTANT

## 2020-09-03 ENCOUNTER — OFFICE VISIT CONVERTED (OUTPATIENT)
Dept: FAMILY MEDICINE CLINIC | Facility: CLINIC | Age: 70
End: 2020-09-03
Attending: NURSE PRACTITIONER

## 2020-09-23 ENCOUNTER — HOSPITAL ENCOUNTER (OUTPATIENT)
Dept: GENERAL RADIOLOGY | Facility: HOSPITAL | Age: 70
Discharge: HOME | End: 2020-09-23
Attending: PHYSICIAN ASSISTANT

## 2020-09-24 ENCOUNTER — PATIENT OUTREACH - CONVERTED (OUTPATIENT)
Dept: FAMILY MEDICINE CLINIC | Facility: CLINIC | Age: 70
End: 2020-09-24
Attending: NURSE PRACTITIONER

## 2020-10-08 ENCOUNTER — HOSPITAL ENCOUNTER (OUTPATIENT)
Dept: GENERAL RADIOLOGY | Facility: HOSPITAL | Age: 70
Discharge: HOME | End: 2020-10-08
Attending: PHYSICIAN ASSISTANT

## 2020-10-08 ENCOUNTER — OFFICE VISIT CONVERTED (OUTPATIENT)
Dept: NEUROSURGERY | Facility: CLINIC | Age: 70
End: 2020-10-08
Attending: PHYSICIAN ASSISTANT

## 2020-11-02 ENCOUNTER — CONVERSION ENCOUNTER (OUTPATIENT)
Dept: NEUROLOGY | Facility: CLINIC | Age: 70
End: 2020-11-02

## 2020-11-02 ENCOUNTER — OFFICE VISIT CONVERTED (OUTPATIENT)
Dept: NEUROLOGY | Facility: CLINIC | Age: 70
End: 2020-11-02
Attending: PSYCHIATRY & NEUROLOGY

## 2020-11-03 ENCOUNTER — HOSPITAL ENCOUNTER (OUTPATIENT)
Dept: LAB | Facility: HOSPITAL | Age: 70
Discharge: HOME | End: 2020-11-03
Attending: PSYCHIATRY & NEUROLOGY

## 2020-11-03 LAB
ALBUMIN SERPL-MCNC: 4 G/DL (ref 3.5–5)
ALBUMIN/GLOB SERPL: 1.2 {RATIO} (ref 1.4–2.6)
ALP SERPL-CCNC: 82 U/L (ref 56–155)
ALT SERPL-CCNC: 21 U/L (ref 10–40)
ANION GAP SERPL CALC-SCNC: 20 MMOL/L (ref 8–19)
AST SERPL-CCNC: 23 U/L (ref 15–50)
BILIRUB SERPL-MCNC: 0.23 MG/DL (ref 0.2–1.3)
BUN SERPL-MCNC: 13 MG/DL (ref 5–25)
BUN/CREAT SERPL: 12 {RATIO} (ref 6–20)
CALCIUM SERPL-MCNC: 9.2 MG/DL (ref 8.7–10.4)
CHLORIDE SERPL-SCNC: 94 MMOL/L (ref 99–111)
CONV CO2: 20 MMOL/L (ref 22–32)
CONV TOTAL PROTEIN: 7.3 G/DL (ref 6.3–8.2)
CREAT UR-MCNC: 1.11 MG/DL (ref 0.7–1.2)
ERYTHROCYTE [SEDIMENTATION RATE] IN BLOOD: 7 MM/H (ref 0–20)
GFR SERPLBLD BASED ON 1.73 SQ M-ARVRAT: >60 ML/MIN/{1.73_M2}
GLOBULIN UR ELPH-MCNC: 3.3 G/DL (ref 2–3.5)
GLUCOSE SERPL-MCNC: 124 MG/DL (ref 70–99)
OSMOLALITY SERPL CALC.SUM OF ELEC: 272 MOSM/KG (ref 273–304)
POTASSIUM SERPL-SCNC: 4.3 MMOL/L (ref 3.5–5.3)
SODIUM SERPL-SCNC: 130 MMOL/L (ref 135–147)
T4 FREE SERPL-MCNC: 1.4 NG/DL (ref 0.9–1.8)
TSH SERPL-ACNC: 1.48 M[IU]/L (ref 0.27–4.2)
VIT B12 SERPL-MCNC: >2000 PG/ML (ref 211–911)

## 2020-11-04 LAB
ALBUMIN SERPL-MCNC: 3.9 G/DL (ref 2.9–4.4)
ALBUMIN/GLOB SERPL: 1.2 {RATIO} (ref 0.7–1.7)
ALPHA2 GLOB SERPL ELPH-MCNC: 1 G/DL (ref 0.4–1)
BETA GLOBULIN: 1 G/DL (ref 0.7–1.3)
CONV ALPHA-1-GLOBULIN: 0.3 G/DL (ref 0–0.4)
CONV PE INTERPRETATION: NORMAL
CONV PE NOTE: NORMAL
CONV TOTAL PROTEIN: 7.1 G/DL (ref 6–8.5)
DSDNA AB SER-ACNC: NEGATIVE [IU]/ML
ENA AB SER IA-ACNC: NEGATIVE {RATIO}
GAMMA GLOB SERPL ELPH-MCNC: 0.9 G/DL (ref 0.4–1.8)
GLOBULIN UR ELPH-MCNC: 3.2 G/DL (ref 2.2–3.9)
M-SPIKE: NORMAL G/DL

## 2020-11-05 LAB
CONV IMMUNOGLOBULIN G (IGG): 1056 MG/DL (ref 603–1613)
CONV IMMUNOGLOBULIN M (IGM): 78 MG/DL (ref 20–172)
IGA SERPL-MCNC: 380 MG/DL (ref 61–437)
PROT PATTERN SERPL IFE-IMP: ABNORMAL

## 2020-11-06 LAB
COPPER SERPL-MCNC: 121 UG/DL (ref 72–166)
Lab: NORMAL

## 2020-11-09 LAB — Lab: NORMAL

## 2020-11-19 ENCOUNTER — HOSPITAL ENCOUNTER (OUTPATIENT)
Dept: LAB | Facility: HOSPITAL | Age: 70
Discharge: HOME | End: 2020-11-19
Attending: NURSE PRACTITIONER

## 2020-11-19 ENCOUNTER — OFFICE VISIT CONVERTED (OUTPATIENT)
Dept: FAMILY MEDICINE CLINIC | Facility: CLINIC | Age: 70
End: 2020-11-19
Attending: NURSE PRACTITIONER

## 2020-11-19 ENCOUNTER — CONVERSION ENCOUNTER (OUTPATIENT)
Dept: FAMILY MEDICINE CLINIC | Facility: CLINIC | Age: 70
End: 2020-11-19

## 2020-11-19 LAB
EST. AVERAGE GLUCOSE BLD GHB EST-MCNC: 120 MG/DL
HBA1C MFR BLD: 5.8 % (ref 3.5–5.7)
URATE SERPL-MCNC: 4.9 MG/DL (ref 3.5–8.5)

## 2020-11-25 LAB
CONV RHEUMATOID FACTOR IGA: 2 UNITS (ref 0–6)
CONV RHEUMATOID FACTOR IGG: 6 UNITS (ref 0–6)
CONV RHEUMATOID FACTOR IGM: 1 UNITS (ref 0–6)

## 2020-11-30 ENCOUNTER — PATIENT OUTREACH - CONVERTED (OUTPATIENT)
Dept: FAMILY MEDICINE CLINIC | Facility: CLINIC | Age: 70
End: 2020-11-30
Attending: NURSE PRACTITIONER

## 2020-12-04 ENCOUNTER — TELEPHONE CONVERTED (OUTPATIENT)
Dept: FAMILY MEDICINE CLINIC | Facility: CLINIC | Age: 70
End: 2020-12-04
Attending: NURSE PRACTITIONER

## 2020-12-30 ENCOUNTER — PATIENT OUTREACH - CONVERTED (OUTPATIENT)
Dept: FAMILY MEDICINE CLINIC | Facility: CLINIC | Age: 70
End: 2020-12-30
Attending: NURSE PRACTITIONER

## 2021-01-06 ENCOUNTER — OFFICE VISIT CONVERTED (OUTPATIENT)
Dept: NEUROLOGY | Facility: CLINIC | Age: 71
End: 2021-01-06
Attending: PSYCHIATRY & NEUROLOGY

## 2021-01-14 ENCOUNTER — HOSPITAL ENCOUNTER (OUTPATIENT)
Dept: GENERAL RADIOLOGY | Facility: HOSPITAL | Age: 71
Discharge: HOME | End: 2021-01-14
Attending: INTERNAL MEDICINE

## 2021-01-19 ENCOUNTER — HOSPITAL ENCOUNTER (OUTPATIENT)
Dept: PHYSICAL THERAPY | Facility: CLINIC | Age: 71
Setting detail: RECURRING SERIES
Discharge: HOME | End: 2021-04-14
Attending: PSYCHIATRY & NEUROLOGY

## 2021-01-29 ENCOUNTER — PATIENT OUTREACH - CONVERTED (OUTPATIENT)
Dept: FAMILY MEDICINE CLINIC | Facility: CLINIC | Age: 71
End: 2021-01-29
Attending: NURSE PRACTITIONER

## 2021-03-04 ENCOUNTER — OFFICE VISIT CONVERTED (OUTPATIENT)
Dept: FAMILY MEDICINE CLINIC | Facility: CLINIC | Age: 71
End: 2021-03-04
Attending: NURSE PRACTITIONER

## 2021-03-05 ENCOUNTER — OFFICE VISIT CONVERTED (OUTPATIENT)
Dept: PULMONOLOGY | Facility: CLINIC | Age: 71
End: 2021-03-05
Attending: PHYSICIAN ASSISTANT

## 2021-03-31 ENCOUNTER — HOSPITAL ENCOUNTER (OUTPATIENT)
Dept: GENERAL RADIOLOGY | Facility: HOSPITAL | Age: 71
Discharge: HOME | End: 2021-03-31
Attending: NURSE PRACTITIONER

## 2021-04-06 ENCOUNTER — HOSPITAL ENCOUNTER (OUTPATIENT)
Dept: LAB | Facility: HOSPITAL | Age: 71
Discharge: HOME | End: 2021-04-06
Attending: NURSE PRACTITIONER

## 2021-04-06 LAB
ALBUMIN SERPL-MCNC: 4.2 G/DL (ref 3.5–5)
ALBUMIN/GLOB SERPL: 1.2 {RATIO} (ref 1.4–2.6)
ALP SERPL-CCNC: 79 U/L (ref 56–155)
ALT SERPL-CCNC: 19 U/L (ref 10–40)
ANION GAP SERPL CALC-SCNC: 19 MMOL/L (ref 8–19)
AST SERPL-CCNC: 19 U/L (ref 15–50)
BASOPHILS # BLD AUTO: 0.09 10*3/UL (ref 0–0.2)
BASOPHILS NFR BLD AUTO: 1.1 % (ref 0–3)
BILIRUB SERPL-MCNC: 0.37 MG/DL (ref 0.2–1.3)
BUN SERPL-MCNC: 17 MG/DL (ref 5–25)
BUN/CREAT SERPL: 14 {RATIO} (ref 6–20)
CALCIUM SERPL-MCNC: 9.6 MG/DL (ref 8.7–10.4)
CHLORIDE SERPL-SCNC: 96 MMOL/L (ref 99–111)
CHOLEST SERPL-MCNC: 213 MG/DL (ref 107–200)
CHOLEST/HDLC SERPL: 7.1 {RATIO} (ref 3–6)
CONV ABS IMM GRAN: 0.03 10*3/UL (ref 0–0.2)
CONV CO2: 22 MMOL/L (ref 22–32)
CONV IMMATURE GRAN: 0.4 % (ref 0–1.8)
CONV TOTAL PROTEIN: 7.8 G/DL (ref 6.3–8.2)
CREAT UR-MCNC: 1.22 MG/DL (ref 0.7–1.2)
DEPRECATED RDW RBC AUTO: 42.8 FL (ref 35.1–43.9)
EOSINOPHIL # BLD AUTO: 0.4 10*3/UL (ref 0–0.7)
EOSINOPHIL # BLD AUTO: 5 % (ref 0–7)
ERYTHROCYTE [DISTWIDTH] IN BLOOD BY AUTOMATED COUNT: 13.1 % (ref 11.6–14.4)
EST. AVERAGE GLUCOSE BLD GHB EST-MCNC: 108 MG/DL
GFR SERPLBLD BASED ON 1.73 SQ M-ARVRAT: >60 ML/MIN/{1.73_M2}
GLOBULIN UR ELPH-MCNC: 3.6 G/DL (ref 2–3.5)
GLUCOSE SERPL-MCNC: 110 MG/DL (ref 70–99)
HBA1C MFR BLD: 5.4 % (ref 3.5–5.7)
HCT VFR BLD AUTO: 44.7 % (ref 42–52)
HDLC SERPL-MCNC: 30 MG/DL (ref 40–60)
HGB BLD-MCNC: 14.9 G/DL (ref 14–18)
LDLC SERPL CALC-MCNC: 140 MG/DL (ref 70–100)
LYMPHOCYTES # BLD AUTO: 1.84 10*3/UL (ref 1–5)
LYMPHOCYTES NFR BLD AUTO: 22.8 % (ref 20–45)
MCH RBC QN AUTO: 29.6 PG (ref 27–31)
MCHC RBC AUTO-ENTMCNC: 33.3 G/DL (ref 33–37)
MCV RBC AUTO: 88.7 FL (ref 80–96)
MONOCYTES # BLD AUTO: 0.79 10*3/UL (ref 0.2–1.2)
MONOCYTES NFR BLD AUTO: 9.8 % (ref 3–10)
NEUTROPHILS # BLD AUTO: 4.92 10*3/UL (ref 2–8)
NEUTROPHILS NFR BLD AUTO: 60.9 % (ref 30–85)
NRBC CBCN: 0 % (ref 0–0.7)
OSMOLALITY SERPL CALC.SUM OF ELEC: 278 MOSM/KG (ref 273–304)
PLATELET # BLD AUTO: 314 10*3/UL (ref 130–400)
PMV BLD AUTO: 9.3 FL (ref 9.4–12.4)
POTASSIUM SERPL-SCNC: 4.3 MMOL/L (ref 3.5–5.3)
RBC # BLD AUTO: 5.04 10*6/UL (ref 4.7–6.1)
SODIUM SERPL-SCNC: 133 MMOL/L (ref 135–147)
TRIGL SERPL-MCNC: 216 MG/DL (ref 40–150)
VLDLC SERPL-MCNC: 43 MG/DL (ref 5–37)
WBC # BLD AUTO: 8.07 10*3/UL (ref 4.8–10.8)

## 2021-04-08 LAB
APPEARANCE UR: CLEAR
BILIRUB UR QL: NEGATIVE
COLOR UR: YELLOW
CONV COLLECTION SOURCE (UA): NORMAL
CONV UROBILINOGEN IN URINE BY AUTOMATED TEST STRIP: 0.2 {EHRLICHU}/DL (ref 0.1–1)
GLUCOSE UR QL: NEGATIVE MG/DL
HGB UR QL STRIP: NEGATIVE
KETONES UR QL STRIP: NEGATIVE MG/DL
LEUKOCYTE ESTERASE UR QL STRIP: NEGATIVE
NITRITE UR QL STRIP: NEGATIVE
PH UR STRIP.AUTO: 6.5 [PH] (ref 5–8)
PROT UR QL: NEGATIVE MG/DL
SP GR UR: 1.01 (ref 1–1.03)

## 2021-04-19 ENCOUNTER — HOSPITAL ENCOUNTER (OUTPATIENT)
Dept: CARDIOLOGY | Facility: HOSPITAL | Age: 71
Discharge: HOME | End: 2021-04-19
Attending: SURGERY

## 2021-04-21 ENCOUNTER — HOSPITAL ENCOUNTER (OUTPATIENT)
Dept: CARDIOLOGY | Facility: HOSPITAL | Age: 71
Discharge: HOME | End: 2021-04-21
Attending: SURGERY

## 2021-05-10 NOTE — OUTREACH NOTE
Quick Note      Patient Name: Jose Rios   Patient ID: [id number]   Sex: Male   YOB: 1950    Primary Care Provider: Jagdeep PEREZ   Referring Provider: Tayla Edward PA-C    Visit Date: November 30, 2020    Provider: CHRIS Toussaint   Location: [facility]   Location Address: [address] [place] [address], [address] [place] [address]  [id number]   Location Phone: [phone]          History Of Present Illness     CCM     TaskID: [id number]    Task Subject: CCM    Task Comments:    Date: Nov 25 2020  3:08PM     Creator: deirdre  cont... medications may cause some bradycardia. She said she will try to monitor it and we can check it at hisnext appt last week. She states she saw Dr. Colin and he laura dher he was having ministrokes. He ordered several labs and they are shceduled to follow up 1/6. I gave her the appt date and time. I also told her of his appt with Jagdeep next 12/4. She states that his blaance is still off. They denied any current needs at this time. (23 mins phone call and chart review)    Date: Nov 25 2020  2:53PM     Creator: deirdre  I called patient for our monthly CCM call after reviewing his chart. he was just seen by Jagdeep. I did notice that his heart rate documented was 46 when he was in the clinic. He has also see Dr. Colin and ortho. Dr Colin had ordered several labs so I reviewed them. His NA was low. I spoke to patients wife on the phone. She said that he has good and bad days. he has chronic pain and goes to pain management. She states that they are changing his regimen some. I asked if they were notified of his recent lab resuls and she was not. I explained what the uric acid would show and that it was negative and his a1c was 5.8%. I did ask her if she checked his heart rate becuase it wa slow when checked in the clinic. She does not normally check it. I asked if she was at the drug store then to pick one up and monitor it. He does have  MVP so it could have been a machine error. I also told her to monitor for palpitations, slow feeling rate. I confirmed his beta blocker dose of 100mg. I explained that those...             Plan  · Medications  o Medications have been Reconciled  o Transition of Care or Provider Policy            Electronically Signed by: Toshia Kessler RN -Author on November 30, 2020 07:54:45 AM

## 2021-05-10 NOTE — OUTREACH NOTE
Quick Note      Patient Name: Jose Rios   Patient ID: [id number]   Sex: Male   YOB: 1950    Primary Care Provider: Jagdeep PEREZ    Visit Date: September 24, 2020    Provider: CHRIS Toussaint   Location: [facility]   Location Address: [address] [place] [address], [address]  [id number]   Location Phone: [phone]          History Of Present Illness  CCM Comprehensive Care Plan    This Chronic Medical Management Care Plan for Jose Rios, 69 year old /White male, has been monitored and managed for the month of September. A cumulative time of 34 minutes was spent on this patient record, including phone call with patient and chart review.   Regarding the patient's diagnoses Anemia, Anxiety disorder, Bipolar affect, depressed, Cervical radiculopathy, Cervical spinal stenosis, Cervicalgia, Chronic pain of both ankles, CKD (chronic kidney disease) stage 3, GFR 30-59 ml/min, COPD (chronic obstructive pulmonary disease), Depression, Essential hypertension, Folic acid deficiency, Foraminal stenosis of cervical region, Gastric reflux, Herniated disc, C5-6, Hyperlipidemia, Hypertension, Hyponatremia, Major depressive disorder, Mediastinal adenopathy, MVP (mitral valve prolapse), JIM (obstructive sleep apnea), Osteoarthritis, Slurred speech, and Ulnar neuropathy, the following items were adressed: medical records, medications, changes to medical care, and transitions to medical care and any changes can be found within the plan section of the note. A detailed listing of time spent for chronic care management has been scanned into the patient's electronic record. Current medications include: amlodipine 5 mg oral tablet, Breo Ellipta 200-25 mcg/dose inhalation blister with device, bupropion HCl 450 mg oral tablet extended release 24 hr, clonazepam 1 mg oral tablet, Incruse Ellipta 62.5 mcg/actuation inhalation blister with device, losartan 50 mg oral  tablet, metoprolol succinate 100 mg oral tablet extended release 24 hr, metoprolol succinate 50 mg oral tablet extended release 24 hr, nystatin 100,000 unit/gram topical powder, Percocet  mg oral tablet, permethrin 5 % topical cream, primidone 50 mg oral tablet, Protonix 40 mg oral tablet,delayed release (DR/EC), turmeric 400 mg oral capsule, Vitamin D3 5,000 unit oral tablet, and Voltaren 1 % topical gel and the patient is reported to be compliant with medication protocol. Medications are reported to be effective. He has been referred to neurology. He is having increased pain, loss of balance, and frequent falls   The patient was monitored remotely for blood pressure, activity level, and specialist appts for a period of 34 minutes.   This patient's physical needs include: DME supplies and. He uses a cane for gait instability. he has had mutltiple falls. We got him in with neuro and they have down multiple scans. He will have to have a CT scan now..   Patient's mental support needs are currently being met.   The patient's cognitive support needs are currently being met.   The patient's psychosocial support needs are N/A,   This patient's functional needs are N/A.   This patient's environmental needs are N/A.           Assessment  · Chronic Care Management (CCM)     V68.89/Z02.89  · Cervicalgia     723.1/M54.2  · JIM (obstructive sleep apnea)     327.23/G47.33  · Osteoarthritis     715.90/M19.90  · Slurred speech     784.59/R47.81  · COPD (chronic obstructive pulmonary disease)     496/J44.9  · Depression     296.31  · Frequent falls     V15.88/R29.6      Plan  · Medications  o Medications have been Reconciled  o Transition of Care or Provider Policy  · Instructions  o Patient's Health Care Goals: No falls; remain independent  o Provider's Health Care Goals: maintain positivity; optimal breathing  o Patient was provided an electronic copy of care plan  o CCM services were explained and offered and patient has  accepted these services.  o Patient has given their written consent to recieve CCM services and understands that this includes the authorization of electronic communication of medical information with other treating providers.  o Patient understands that they may stop CCM services at any time and these changes will be effective at the end of the calendar month and will effectively revocate the agreement of CCM services.  o Patient understands that only one practioner can furnish and be paid for CCM services during one calendar month. Patient also understands that there may be co-payment or deductible fees in association with CCM services.  o Patient will continue with at least monthly follow-up calls with the Nurse Navigator.  · Associate Tasks  o Task ID [id number] CCM: CCM            Electronically Signed by: Toshia Kessler RN -Author on September 24, 2020 02:25:39 PM

## 2021-05-10 NOTE — OUTREACH NOTE
Quick Note      Patient Name: Jose Rios   Patient ID: [id number]   Sex: Male   YOB: 1950    Primary Care Provider: Jagdeep PEREZ   Referring Provider: Tayla Edward PA-C    Visit Date: December 30, 2020    Provider: CHRIS Toussaint   Location: [facility]   Location Address: [address] [place] [address], [address] [place] [address]  [id number]   Location Phone: [phone]          History Of Present Illness     [facility]     TaskID: [id number]    Task Subject: CCM    Task Comments:    Date: Dec  9 2020  3:51PM     Creator: deirdre  addendum... I also discussed his a1c. I told him it was 5.8% which is not bad. I did say we would have to keep an eye on that to monitor for him developing diabetes.    Date: Dec  9 2020  3:49PM     Creator: deirdre  cont... He states he does not have a pulm appointment for a few months. He denied any needs at this time. (21 mins total, 8 mins chart review/documentation, 13 mins phone call).    Date: Dec  9 2020  3:48PM     Creator: deirdre  I reviewed paptients chart prior to calling for CCM call. I saw he recently saw Jagdeep on 12/4 via phone visit. There were no new orders or issues at this time. He has also seen pain management on 12/1. I called Mr. Rios. He said he is doing okay. He has good and bad days. i asked about his anxiety and depression. He is 450 of wellbutrin and klonopin. He statest that those do help. I asked if he had fallen any. He said he has fallen about 1 time in the last few weeks. He does use a cane. I asked if he needed a walker but he has one. He does have back pain and see [facility] management and Dr. Mayorga. He states that he does have a neuro appointment in January. It is with Dr. Colin. He said his breathing has been ok. I asked if he had been checking his o2 levels. He does not. He does check his heart rate. He said it is usually in the 60's. I asked him to monitor those because he is on high doses of beta blockers.  I explained those meds can bring down your heart rate. He acknowledged understanding...             Plan  · Medications  o Medications have been Reconciled  o Transition of Care or Provider Policy            Electronically Signed by: Toshia Kessler RN -Author on December 30, 2020 09:41:41 AM

## 2021-05-10 NOTE — OUTREACH NOTE
Quick Note      Patient Name: Jose Rios   Patient ID: [id number]   Sex: Male   YOB: 1950    Primary Care Provider: Jagdeep PEREZ   Referring Provider: Tayla Edward PA-C    Visit Date: January 29, 2021    Provider: CHRIS Toussaint   Location: [facility]   Location Address: [address] [place] [address], [address] [place] [address]  [id number]   Location Phone: [phone]          History Of Present Illness  CCM Comprehensive Care Plan    This Chronic Medical Management Care Plan for Jose Rios, 70 year old /White male, has been monitored and managed for the month of January. A cumulative time of 24 minutes was spent on this patient record, including phone call with patient and chart review.   Regarding the patient's diagnoses Anemia, Anxiety disorder, Bipolar affect, depressed, Cervical radiculopathy, Cervical spinal stenosis, Cervicalgia, Chronic pain of both ankles, CKD (chronic kidney disease) stage 3, GFR 30-59 ml/min, COPD (chronic obstructive pulmonary disease), Depression, Essential hypertension, Folic acid deficiency, Foraminal stenosis of cervical region, Gastric reflux, Herniated disc, C5-6, Hyperlipidemia, Hypertension, Hyponatremia, Major depressive disorder, Mediastinal adenopathy, MVP (mitral valve prolapse), JIM (obstructive sleep apnea), Osteoarthritis, Slurred speech, and Ulnar neuropathy, the following items were adressed: medical records, medications, and changes to medical care and any changes can be found within the plan section of the note. A detailed listing of time spent for chronic care management has been scanned into the patient's electronic record. Current medications include: amlodipine 5 mg oral tablet, Breo Ellipta 200-25 mcg/dose inhalation blister with device, bupropion HCl 450 mg oral tablet extended release 24 hr, clonazepam 1 mg oral tablet, Incruse Ellipta 62.5 mcg/actuation inhalation blister with device, LOSARTAN  POTASSIUM 50 MG TA 50 TAB, METOPROLOL SUCC  MG T 100 TAB, METOPROLOL SUCC ER 50 MG TA 50 TAB, nystatin 100,000 unit/gram topical powder, PANTOPRAZOLE SOD DR 40 MG T 40 TAB, Percocet  mg oral tablet, permethrin 5 % topical cream, primidone 50 mg oral tablet, turmeric 400 mg oral capsule, Vitamin D3 5,000 unit oral tablet, and Voltaren 1 % topical gel and the patient is reported to be compliant with medication protocol. Medications are reported to be effective.   The patient was monitored remotely for blood pressure and activity level for a period of 24 minutes.   This patient's physical needs include: resources for disability needs and DME supplies. Patient does utilize a cane for gait instability. He does have a walker PRN. He is followed by pulmonology, neurology, and pain management.   Patient's mental support needs include: continued support. He does have anxiety and depression. He feels like the medications help with these symptoms.   The patient's cognitive support needs are currently being met.   The patient's psychosocial support needs are N/A,   This patient's functional needs are N/A.   This patient's environmental needs are N/A.           Assessment  · Chronic Care Management (CCM)     V68.89/Z02.89  · Anemia     285.9/D64.9  · Anxiety disorder     300.00/F41.9  He states that his anxiety has been worse. [facility] and tried SSRI's w/o much benefit. He states that Klonopin helped, but stopped going and then stopped the Klonopin. Restart the Klonopin.   · Cervical radiculopathy     723.4/M54.12  · Cervicalgia     723.1/M54.2  · CKD (chronic kidney disease) stage 3, GFR 30-59 ml/min     585.3/N18.3  · Essential hypertension     401.9/I10  · Hyponatremia     276.1/E87.1  · MVP (mitral valve prolapse)     424.0/I34.1  · Osteoarthritis     715.90/M19.90  · COPD (chronic obstructive pulmonary  disease)     496/J44.9  · Depression     296.31  · Hyperlipidemia     272.4/E78.5  · Hypertension     401.9/I10      Plan  · Medications  o Medications have been Reconciled  o Transition of Care or Provider Policy  · Instructions  o Patient's Health Care Goals: maintain tolerable level pain  o Provider's Health Care Goals: Maintain tolerable level of pain; no falls; optimal gas exchange  o Patient was provided an electronic copy of care plan  o CCM services were explained and offered and patient has accepted these services.  o Patient has given their written consent to recieve CCM services and understands that this includes the authorization of electronic communication of medical information with other treating providers.  o Patient understands that they may stop CCM services at any time and these changes will be effective at the end of the calendar month and will effectively revocate the agreement of CCM services.  o Patient understands that only one practioner can furnish and be paid for CCM services during one calendar month. Patient also understands that there may be co-payment or deductible fees in association with CCM services.  o Patient will continue with at least monthly follow-up calls with the Nurse Navigator.  · Associate Tasks  o Task ID [id number] CCM: CCM            Electronically Signed by: Toshia Kessler RN -Author on January 29, 2021 09:51:51 AM

## 2021-05-10 NOTE — OUTREACH NOTE
"   Quick Note      Patient Name: Jose Rios   Patient ID: [id number]   Sex: Male   YOB: 1950    Primary Care Provider: Jagdeep PEREZ   Referring Provider: Tayla Edward PA-C    Visit Date: January 29, 2021    Provider: CHRIS Toussaint   Location: [facility]   Location Address: [address] [place] [address], [address] [place] [address]  [id number]   Location Phone: [phone]          History Of Present Illness     CCM     TaskID: [id number]    Task Subject: CCM    Task Comments:    Date: Goran 15 2021  9:19AM     Creator: deirdre  cont...  up at night. He sometimes has a hard time getting up out of bed. I talked about possibly getting a hospital bed. He does not want to at this time. I told him to keep that in mind as his disease progresses. HIs blood pressures have been 150/80-90's. I told him to keep a BP log for us so we could discuss those at his next appt. He acknolwedges understanding. He denies any needs at this time. (24 mins)    Date: Goran 15 2021  9:15AM     Creator: deirdre  I reviewed pts chart prior to calling him for our monthly CCM call. It looks like he has been following up with Dr. Colin. He put in his recent note from this month that the gait instability is caused from the white matter changes in his brain and neuropathy. He said that it is symptom management at this point.  He states that he has been doing okay. He states that he starts PT next week at [facility] otpatient PT. He is hopeful that it will help. He does still have chronic pian. He reports taking the percocet for that. He is not on any gabapentin any more. He said the hospital took him off of that because it caused sodium imbalances. He states that his moods are up and down. He does feel like the medications help. He states that he has been staying in trying to avoid covid-19 by staing home. He states that his breathing is \"okay\". He had his low dose CT scan yesterday. I reviewed that. He " is followed by pulmonology. He said he does get short of breath if he lays flat. I talked about propping himself..                   Electronically Signed by: Toshia Kessler RN -Author on January 29, 2021 09:52:35 AM

## 2021-05-10 NOTE — OUTREACH NOTE
"   Quick Note      Patient Name: Jose Rios   Patient ID: [id number]   Sex: Male   YOB: 1950    Primary Care Provider: Jagdeep PEREZ    Visit Date: June 24, 2020    Provider: CHRIS Toussaint   Location: [facility]   Location Address: [address] [place] [address], [address]  [place]  [id number]   Location Phone: [phone]          History Of Present Illness     CCM     TaskID: [id number]    Task Subject: CCM    Task Comments:    Date: Jun 4 2020  9:29AM     Creator: deirdre  I called [facility] and it looks like he no showed his appt that was made for him 5/27/2020 so I rescheduled the patient for 6/11/2020 at 2:00pm. I called Mr. Rios and explained this to him. He wrote down the appt date and time. (6 mins)    Date: Jun 4 2020  9:18AM     Creator: deirdre  cont... ordered a podiatry consult and it looks like it was scheduled. I asked him about this but he does not know anything about it. So I will need to further look into this. He states that he is doing well on all of his medications and denied any needs at this time. (21 mins chart review and phone call)    Date: Jun 4 2020  9:14AM     Creator: deirdre  I reviewed the patients chart and then called him for our monthly CCM call. He states that he is doing \"okay\". He reports that he has not gotten out much due to the covid 19 pandemic. He seems to be staying pretty healthy. I reviewed his labs with him. The most signficant was his impaired kidney function. It looks like Jagdeep has adjusted medications to help with that. I did notice that his blood sugar was elevated. I found that he had an A1C done in October and it was only 5.5 which is good so we will need to monitor his FBG. We also discussed his weight gain. I noted over the last few months he has gained 15 pounds. He stated that he got to where he did not want to eat. I asked if that was due to his depression and he did think that could have been a factor. Jagdeep " put him on wellbutrin and he has really noticed a difference. He states that it is helping tremendously and now he is eating more. I noticed that he had some xrays last month of his foot and looked like there was some arthritis noted. Jagdeep..             Plan  · Medications  o Medications have been Reconciled  o Transition of Care or Provider Policy            Electronically Signed by: Toshia Kessler RN -Author on June 24, 2020 09:32:22 AM

## 2021-05-10 NOTE — H&P
History and Physical      Patient Name: Jose Rios   Patient ID: [id number]   Sex: Male   YOB: 1950    Primary Care Provider: Jagdeep PEREZ   Referring Provider: Tayla Edward PA-C    Visit Date: November 2, 2020    Provider: Yamil Colin MD   Location: [facility]   Location Address: [address] [place] [address]  [id number]   Location Phone: [phone]          Chief Complaint     New pt here for loss of balance, frequent falls.       History Of Present Illness  Jose Rios is a 69 year old /White male who presents today to [facility] today referred from Tayla Edward PA-C.      69-year-old man evaluated for unsteadiness.  He states that has been falling for the last 3 to 4 months.  He has a difficult time keeping his balance.  When he takes a shower is hard for him to keep his balance as well.  He is unsteady and he can fall down sideways, forwards.  He has some numbness in his feet but not that significant.  He has had an MRI of the brain showing white matter changes secondary to small vessel disease.  He had a recent bleeding in his brain as well seen on MRI but not on CAT scan.  He has had an MRI of the cervical spine which does not show spinal cord compression as well as an MRI of his lumbar spine.  He does not have a history of diabetes.  He has a cane and a walker at home but he does not use it.  He is following with neurosurgery at this point.  He has had fusion in his neck.       Past Medical History  Anemia; Anxiety disorder; Arthritis; Bipolar affect, depressed; Cervical radiculopathy; Cervical spinal stenosis; Cervicalgia; Chronic Obstructive Pulmonary Disease; Chronic pain of both ankles; CKD (chronic kidney disease) stage 3, GFR 30-59 ml/min; COPD (chronic obstructive pulmonary disease); Depression; Essential hypertension; Folic acid deficiency; Foraminal stenosis of cervical region; Gastric reflux; Head  injury; Herniated disc, C5-6; Hyperlipidemia; Hypertension; Hyponatremia; Hyponatremia; Limb Swelling; Lung disease; Major depressive disorder; Mediastinal adenopathy; Migraine; MVP (mitral valve prolapse); JIM (obstructive sleep apnea); Osteoarthritis; Pneumonia; Reflux; Shortness of Breath; Slurred speech; Tremor of right hand; Ulnar neuropathy         Past Surgical History  Appendectomy; Arm Surgery; Back; Cervical Fusion; Cervical intervertebral disc surgery         Medication List  amlodipine 5 mg oral tablet; Breo Ellipta 200-25 mcg/dose inhalation blister with device; bupropion HCl 450 mg oral tablet extended release 24 hr; clonazepam 1 mg oral tablet; Incruse Ellipta 62.5 mcg/actuation inhalation blister with device; losartan 50 mg oral tablet; METOPROLOL SUCC ER 50 MG TA 50 TAB; metoprolol succinate 100 mg oral tablet extended release 24 hr; nystatin 100,000 unit/gram topical powder; Percocet  mg oral tablet; permethrin 5 % topical cream; primidone 50 mg oral tablet; Protonix 40 mg oral tablet,delayed release (DR/EC); turmeric 400 mg oral capsule; Vitamin D3 5,000 unit oral tablet; Voltaren 1 % topical gel         Allergy List  PENICILLINS         Family Medical History  Stroke; Heart Disease; Cancer, Unspecified; Family history of certain chronic disabling diseases; arthritis; Family history of Arthritis         Social History  Alcohol (Never); lives with children; lives with parents; .; Recreational Drug Use (Never); Retired.; Tobacco (Current some day)         Immunizations  Name Date Admin   Influenza 10/01/2019   Influenza 10/01/2018   Influenza 10/01/2018   Influenza 10/01/2018   Influenza 10/16/2017   Ybzlhevkh55 08/01/2014   Tmryzzxeb43 08/01/2014   Exfnckcqd03 08/01/2014   Prevnar 13 07/29/2019   Prevnar 13 07/29/2019   Prevnar 13 07/29/2019         Review of Systems  · Constitutional  o Denies  o : chills, excessive sweating, fatigue, fever, sycope/passing out, weight gain, weight  loss  · Eyes  o Denies  o : changes in vision, blurry vision, double vision  · HENT  o Denies  o : loss of hearing, ringing in the ears, ear aches, sore throat, nasal congestion, sinus pain, nose bleeds, seasonal allergies  · Cardiovascular  o Denies  o : blood clots, swollen legs, anemia, easy burising or bleeding, transfusions  · Respiratory  o Denies  o : shortness of breath, dry cough, productive cough, pneumonia, COPD  · Gastrointestinal  o Denies  o : difficulty swallowing, reflux  · Genitourinary  o Denies  o : incontinence  · Neurologic  o Denies  o : headache, seizure, stroke, tremor, loss of balance, falls, dizziness/vertigo, difficulty with sleep, numbness/tingling/paresthesia , difficulty with coordination, difficulty with dexterity, weakness  · Musculoskeletal  o Denies  o : neck stiffness/pain, swollen lymph nodes, muscle aches, joint pain, weakness, spasms, sciatica, pain radiating in arm, pain radiating in leg, low back pain  · Endocrine  o Denies  o : diabetes, thyroid disorder  · Psychiatric  o Denies  o : anxiety, depression      Vitals  Date Time BP Position Site L\R Cuff Size HR RR TEMP (F) WT  HT  BMI kg/m2 BSA m2 O2 Sat FR L/min FiO2 HC       11/02/2020 12:03 /65 Sitting    60 - R   220lbs 0oz 6'   29.84 2.25             Physical Examination     He is alert, fluent, phasic, follows commands well.  Optic disks are normal, visual fields are full, EOMs full in all directions gaze, facial sensation symmetrical to pinprick, facial strength is full, soft palate elevation and tongue normal.  There is no weakness of the upper or lower extremities individual muscle testing except the ulnar innervated muscles in the hand.  Reflexes are absent in the biceps, triceps, patellar's and ankles.  Sensation is decreased to pinprick in a stocking distribution to his thigh.  Vibration is impaired higher than the ankles and decrease in the hand.  Proprioception is decreased in the right foot compared to left  foot.  Station and gait is able to tiptoe, heel walk, alia without any weakness however he is unable to tandem.  Armswing is normal, turning is intact.  Heart is regular rhythm normal in rate.               Assessment  · Gait instability     781.2/R26.81  His gait abnormality is multifactorial. He does not have Parkinson's disease. It is mainly from proprioceptive abnormalities noted in his lower extremities. He is to use a cane and a walker at all times.    45 minutes was spent for this moderate complexity visit and more than half the time was spent face-to-face with the patient for examination, counseling, planning and recommendations.  · MRI of brain abnormal     793.0/R90.89  I discussed with him that he has significant amount of white matter changes in his brain which could also affect his balance. He had recent bleed of undetermined etiology seen on MRI.  · Peripheral neuropathy     356.9/G62.9  I discussed with him that he is gait abnormality secondary to proprioceptive abnormalities and he needs to use a walker or cane at all times to prevent him from falling. He has had multiple falls in the past secondary to the proprioceptive defects. I will order a neuropathy profile and they are to call us in the next week after it is done to determine if there is any findings that can be treated.      Plan  · Orders  o CMP [facility] (04143) - 781.2/R26.81, 356.9/G62.9, 793.0/R90.89 - 11/02/2020  o Hgb A1c [facility] (76920) - 781.2/R26.81, 356.9/G62.9, 793.0/R90.89 - 11/02/2020  o Vitamin B12 level (24279) - 781.2/R26.81, 356.9/G62.9, 793.0/R90.89 - 11/02/2020  o Serum electrophoresis (80067) - 781.2/R26.81, 356.9/G62.9, 793.0/R90.89 - 11/02/2020  o Immunofixation electrophoresis; serum (83877) - 781.2/R26.81, 356.9/G62.9, 793.0/R90.89 - 11/02/2020  o Thyroid Profile (TSH, FT4) (57364, 21481, THYII) - 781.2/R26.81, 356.9/G62.9, 793.0/R90.89 - 11/02/2020  o XIMENA (antinuclear antibody profile) by enzyme immunoassay (03749) -  781.2/R26.81, 356.9/G62.9, 793.0/R90.89 - 11/02/2020  o ESR (23939) - 781.2/R26.81, 356.9/G62.9, 793.0/R90.89 - 11/02/2020  o Rheumatoid Factor IgG, IgM, and IgA [facility] (40053) - 781.2/R26.81, 356.9/G62.9, 793.0/R90.89 - 11/02/2020  o Hu antibody assay (29644) - 781.2/R26.81, 356.9/G62.9, 793.0/R90.89 - 11/02/2020  o Lyme disease IgM antibody assay by Western blot (96382) - 781.2/R26.81, 356.9/G62.9, 793.0/R90.89 - 11/02/2020  o Hepatitis B and C screen (98665) - 781.2/R26.81, 356.9/G62.9, 793.0/R90.89 - 11/02/2020  o Copper level (18475) - 781.2/R26.81, 356.9/G62.9, 793.0/R90.89 - 11/02/2020  · Instructions  o Encouraged to follow-up with Primary Care Provider for preventative care.            Electronically Signed by: Yamil Colin MD -Author on November 2, 2020 12:40:29 PM

## 2021-05-10 NOTE — OUTREACH NOTE
Quick Note      Patient Name: Jose Rios   Patient ID: [id number]   Sex: Male   YOB: 1950    Primary Care Provider: Jagdeep PEREZ    Visit Date: July 29, 2020    Provider: CHRIS Toussaint   Location: [facility]   Location Address: [address] [place] [address], [address]  [id number]   Location Phone: [phone]          History Of Present Illness  CCM Comprehensive Care Plan    This Chronic Medical Management Care Plan for Jose Rios, 69 year old /White male, has been monitored and managed for the month of July. A cumulative time of 21 minutes was spent on this patient record, including phone call with patient, phone call with other providers, and chart review.   Regarding the patient's diagnoses Anemia, Anxiety disorder, Bipolar affect, depressed, Cervical radiculopathy, Cervical spinal stenosis, Cervicalgia, Chronic pain of both ankles, CKD (chronic kidney disease) stage 3, GFR 30-59 ml/min, COPD (chronic obstructive pulmonary disease), Depression, Essential hypertension, Folic acid deficiency, Foraminal stenosis of cervical region, Gastric reflux, Herniated disc, C5-6, Hyperlipidemia, Hypertension, Hyponatremia, Major depressive disorder, Mediastinal adenopathy, MVP (mitral valve prolapse), JIM (obstructive sleep apnea), Osteoarthritis, Slurred speech, and Ulnar neuropathy, the following items were adressed: medical records, medications, and transitions to medical care and any changes can be found within the plan section of the note. A detailed listing of time spent for chronic care management has been scanned into the patient's electronic record. Current medications include: amlodipine 5 mg oral tablet, Breo Ellipta 200-25 mcg/dose inhalation blister with device, bupropion HCl 450 mg oral tablet extended release 24 hr, clonazepam 1 mg oral tablet, diclofenac sodium 75 mg oral tablet,delayed release (DR/EC), Incruse Ellipta 62.5 mcg/actuation inhalation  blister with device, losartan 50 mg oral tablet, metoprolol succinate 100 mg oral tablet extended release 24 hr, metoprolol succinate 50 mg oral tablet extended release 24 hr, nystatin 100,000 unit/gram topical powder, Percocet  mg oral tablet, permethrin 5 % topical cream, primidone 50 mg oral tablet, Protonix 40 mg oral tablet,delayed release (DR/EC), turmeric 400 mg oral capsule, Vitamin D3 5,000 unit oral tablet, and Voltaren 1 % topical gel and the patient is reported to be compliant with medication protocol. Medications are reported to be effective.   The patient was monitored remotely for blood pressure and activity level for a period of 21 minutes.   This patient's physical needs include: resources for disability needs and DME supplies. He does use a cane for gait stability.   Patient's mental support needs are currently being met.   The patient's cognitive support needs are currently being met.   The patient's psychosocial support needs are N/A,   This patient's functional needs are N/A.   This patient's environmental needs are N/A.           Assessment  · Chronic Care Management (CCM)     V68.89/Z02.89  · Anxiety disorder     300.00/F41.9  He states that his anxiety has been worse. He has gone to [facility] in the past and tried SSRI's w/o much benefit. He states that Klonopin helped, but stopped going and then stopped the Klonopin. Restart the Klonopin.   · Osteoarthritis     715.90/M19.90  · COPD (chronic obstructive pulmonary disease)     496/J44.9  · Depression     296.31      Plan  · Medications  o Medications have been Reconciled  o Transition of Care or Provider Policy  · Instructions  o Patient's Health Care Goals: Continue to stay active; manage pain  o Provider's Health Care Goals: Maintain positive out look; continue to manage depression; decrease pain  o Patient was provided an electronic copy of care plan  o CCM services were explained and offered and patient has accepted these  services.  o Patient has given their written consent to recieve CCM services and understands that this includes the authorization of electronic communication of medical information with other treating providers.  o Patient understands that they may stop CCM services at any time and these changes will be effective at the end of the calendar month and will effectively revocate the agreement of CCM services.  o Patient understands that only one practioner can furnish and be paid for CCM services during one calendar month. Patient also understands that there may be co-payment or deductible fees in association with CCM services.  o Patient will continue with at least monthly follow-up calls with the Nurse Navigator.  · Associate Tasks  o Task ID [id number] CCM: CCM            Electronically Signed by: Toshia Kessler RN -Author on July 29, 2020 01:50:57 PM

## 2021-05-10 NOTE — OUTREACH NOTE
Quick Note      Patient Name: Jose Rios   Patient ID: [id number]   Sex: Male   YOB: 1950    Primary Care Provider: Jagdeep PEREZ    Visit Date: July 29, 2020    Provider: CHRIS Toussaint   Location: [facility]   Location Address: [address] [place] [address], [address]  [id number]   Location Phone: [phone]          History Of Present Illness     CCM     TaskID: [id number]    Task Subject: CCM    Task Comments:    Date: Jul 17 2020  3:43PM     Creator: deirdre  I called [facility]. They stated that he did come to his appointment in June and he does also already have follow up appts made. I did ask them to fax me the progress note. They stated that they would fax that over to me. (3 mins)    Date: Jul 17 2020  3:40PM     Creator: [email or web address]  I called Mr. Rios for our monthly CCM call. He states that he was doing okay. He states jessica the was nodding off so I think I woke him up. He stated that he did not go to a podiatry appt. Theo michaudiewed his chart. I had made him an appt at [facility] for some arthritis in his feet. He states that he did not go to that appointment. I will call and see. He states that he is breathing okay, labored at times but that is normal for him he reports. I did see he had a telehealth appt with Dr. Kruger in June. It looks like he was started on spiriva. They are going to continue doing annual CT scans. He does see a thoracic surgeon in [place] who monitors his AAA. He states that he is doing well on all of his medications. He denied any other needs at this time. I told him to call if he did need anything. His next appt and labs for Jagdeep are (9/2/2020. (18 mins phone call and chart review)             Plan  · Medications  o Medications have been Reconciled  o Transition of Care or Provider Policy            Electronically Signed by: Toshia Kessler RN -Author on July 29, 2020 01:51:57 PM

## 2021-05-10 NOTE — OUTREACH NOTE
Quick Note      Patient Name: Jose Rios   Patient ID: [id number]   Sex: Male   YOB: 1950    Primary Care Provider: Jagdeep PEREZ    Visit Date: June 24, 2020    Provider: CHRIS Toussaint   Location: [facility]   Location Address: [address] [place] [address], [address]  [id number]   Location Phone: [phone]          History Of Present Illness  CCM Comprehensive Care Plan    This Chronic Medical Management Care Plan for Jose Rios, 69 year old /White male, has been monitored and managed for the month of June. A cumulative time of 27 minutes was spent on this patient record, including phone call with patient, phone call with other providers, and chart review.   Regarding the patient's diagnoses Anemia, Anxiety disorder, Bipolar affect, depressed, Cervical radiculopathy, Cervical spinal stenosis, Cervicalgia, Chronic pain of both ankles, CKD (chronic kidney disease) stage 3, GFR 30-59 ml/min, COPD (chronic obstructive pulmonary disease), Depression, Essential hypertension, Folic acid deficiency, Foraminal stenosis of cervical region, Gastric reflux, Herniated disc, C5-6, Hyperlipidemia, Hypertension, Hyponatremia, Major depressive disorder, Mediastinal adenopathy, MVP (mitral valve prolapse), JIM (obstructive sleep apnea), Osteoarthritis, Slurred speech, and Ulnar neuropathy, the following items were adressed: medical records and medications and any changes can be found within the plan section of the note. A detailed listing of time spent for chronic care management has been scanned into the patient's electronic record. Current medications include: amlodipine 5 mg oral tablet, Breo Ellipta 200-25 mcg/dose inhalation blister with device, bupropion HCl 450 mg oral tablet extended release 24 hr, clonazepam 1 mg oral tablet, diclofenac sodium 75 mg oral tablet,delayed release (DR/EC), Incruse Ellipta 62.5 mcg/actuation inhalation blister with device, losartan  50 mg oral tablet, metoprolol succinate 100 mg oral tablet extended release 24 hr, metoprolol succinate 50 mg oral tablet extended release 24 hr, nystatin 100,000 unit/gram topical powder, Percocet  mg oral tablet, permethrin 5 % topical cream, primidone 50 mg oral tablet, Protonix 40 mg oral tablet,delayed release (DR/EC), turmeric 400 mg oral capsule, Vitamin D3 5,000 unit oral tablet, and Voltaren 1 % topical gel and the patient is reported to be compliant with medication protocol. Medications are reported to be effective. Regarding these diagnoses, [facility]   The patient was monitored remotely for activity level, blood glucose, and labs, apt with [facility] for a period of 27 minutes.   This patient's physical needs include: resources for disability needs, DME supplies, and. He uses a cane for gait stability.   Patient's mental support needs include: continued support and. He is having some depression and being isolated because of coronavirus has not helped. He is on some medications which he believes help with his mood.   The patient's cognitive support needs are currently being met.   The patient's psychosocial support needs are N/A,   This patient's functional needs are N/A.   This patient's environmental needs are N/A.           Assessment  · Chronic Care Management (CCM)     V68.89/Z02.89  · Osteoarthritis     715.90/M19.90  · COPD (chronic obstructive pulmonary disease)     496/J44.9  · Depression     296.31      Plan  · Medications  o Medications have been Reconciled  o Transition of Care or Provider Policy  · Instructions  o Patient's Health Care Goals: Feel more in control of health  o Provider's Health Care Goals: Go to follow up appts; decrease pain; maintain positive outlook  o Patient was provided an electronic copy of care plan  o CCM services were explained and offered and patient has accepted these  services.  o Patient has given their written consent to recieve CCM services and understands that this includes the authorization of electronic communication of medical information with other treating providers.  o Patient understands that they may stop CCM services at any time and these changes will be effective at the end of the calendar month and will effectively revocate the agreement of CCM services.  o Patient understands that only one practioner can furnish and be paid for CCM services during one calendar month. Patient also understands that there may be co-payment or deductible fees in association with CCM services.  o Patient will continue with at least monthly follow-up calls with the Nurse Navigator.  · Associate Tasks  o Task ID [id number] CCM: CCM            Electronically Signed by: Toshia Kessler RN -Author on June 24, 2020 09:31:57 AM

## 2021-05-10 NOTE — OUTREACH NOTE
Quick Note      Patient Name: Jose Rios   Patient ID: [id number]   Sex: Male   YOB: 1950    Primary Care Provider: Jagdeep PEREZ   Referring Provider: Tayla Edward PA-C    Visit Date: November 30, 2020    Provider: CHRIS Toussaint   Location: [facility]   Location Address: [address] [place] [address], [address] [place] [address]  [id number]   Location Phone: [phone]          History Of Present Illness  CCM Comprehensive Care Plan    This Chronic Medical Management Care Plan for Jose Rios, 69 year old /White male, has been monitored and managed for the month of November. A cumulative time of 23 minutes was spent on this patient record, including chart review and phone call with wife.   Regarding the patient's diagnoses Anemia, Anxiety disorder, Bipolar affect, depressed, Cervical radiculopathy, Cervical spinal stenosis, Cervicalgia, Chronic pain of both ankles, CKD (chronic kidney disease) stage 3, GFR 30-59 ml/min, COPD (chronic obstructive pulmonary disease), Depression, Essential hypertension, Folic acid deficiency, Foraminal stenosis of cervical region, Gastric reflux, Herniated disc, C5-6, Hyperlipidemia, Hypertension, Hyponatremia, Major depressive disorder, Mediastinal adenopathy, MVP (mitral valve prolapse), JIM (obstructive sleep apnea), Osteoarthritis, Slurred speech, and Ulnar neuropathy, the following items were adressed: medical records, medications, and changes to medical care and any changes can be found within the plan section of the note. A detailed listing of time spent for chronic care management has been scanned into the patient's electronic record. Current medications include: amlodipine 5 mg oral tablet, Breo Ellipta 200-25 mcg/dose inhalation blister with device, bupropion HCl 450 mg oral tablet extended release 24 hr, clonazepam 1 mg oral tablet, Incruse Ellipta 62.5 mcg/actuation inhalation blister with device, losartan 50  mg oral tablet, METOPROLOL SUCC ER 50 MG TA 50 TAB, metoprolol succinate 100 mg oral tablet extended release 24 hr, nystatin 100,000 unit/gram topical powder, Percocet  mg oral tablet, permethrin 5 % topical cream, primidone 50 mg oral tablet, Protonix 40 mg oral tablet,delayed release (DR/EC), turmeric 400 mg oral capsule, Vitamin D3 5,000 unit oral tablet, and Voltaren 1 % topical gel and the patient is reported to be compliant with medication protocol. Medications are reported to be effective. Regarding these diagnoses, referrals were made to the following provider/s Yamil Colin MD, Percy Kruger MD, and Tayla Edward PA-C.   The patient was monitored remotely for blood pressure, activity level, blood glucose, and labs, specialist appoints, breathing for a period of 15 minutes.   This patient's physical needs include: DME supplies. Patient does have gait dysfunction and has been utilizing a can. he also has a walker. The patients wife stated that Dr. Colin told them that he was having ministrokes. He ordered several labs and wanted to follow up..   Patient's mental support needs are currently being met.   The patient's cognitive support needs are currently being met.   The patient's psychosocial support needs are N/A,   This patient's functional needs are N/A.   This patient's environmental needs are N/A.           Assessment  · Chronic Care Management (CCM)     V68.89/Z02.89  · Cervical radiculopathy     723.4/M54.12  · CKD (chronic kidney disease) stage 3, GFR 30-59 ml/min     585.3/N18.3  · Essential hypertension     401.9/I10  · Hyponatremia     276.1/E87.1  · Major depressive disorder     296.20/F32.2  · MVP (mitral valve prolapse)     424.0/I34.1  · JIM (obstructive sleep apnea)     327.23/G47.33  · COPD (chronic obstructive pulmonary disease)     496/J44.9  · Impaired fasting glucose     790.21/R73.01      Plan  · Medications  o Medications have been Reconciled  o Transition of Care or  Provider Policy  · Instructions  o Patient's Health Care Goals: No falls; tolerable pain  o Provider's Health Care Goals: No falls/injuries; go to neurology; maintain heart rate >60  o Patient was provided an electronic copy of care plan  o CCM services were explained and offered and patient has accepted these services.  o Patient has given their written consent to recieve CCM services and understands that this includes the authorization of electronic communication of medical information with other treating providers.  o Patient understands that they may stop CCM services at any time and these changes will be effective at the end of the calendar month and will effectively revocate the agreement of CCM services.  o Patient understands that only one practioner can furnish and be paid for CCM services during one calendar month. Patient also understands that there may be co-payment or deductible fees in association with CCM services.  o Patient will continue with at least monthly follow-up calls with the Nurse Navigator.  · Associate Tasks  o Task ID [id number] CCM: CCM            Electronically Signed by: Toshia Kessler RN -Author on November 30, 2020 07:54:18 AM

## 2021-05-10 NOTE — OUTREACH NOTE
Quick Note      Patient Name: Jose Rios   Patient ID: [id number]   Sex: Male   YOB: 1950    Primary Care Provider: Jagdeep PEREZ   Referring Provider: Tayla Edward PA-C    Visit Date: December 30, 2020    Provider: CHRIS Toussaint   Location: [facility]   Location Address: [address] [place] [address], [address] [place] [address]  [id number]   Location Phone: [phone]          History Of Present Illness  CCM Comprehensive Care Plan    This Chronic Medical Management Care Plan for Jose Rios, 70 year old /White male, has been monitored and managed for the month of December. A cumulative time of 21 minutes was spent on this patient record, including phone call with patient and chart review.   Regarding the patient's diagnoses Anemia, Anxiety disorder, Bipolar affect, depressed, Cervical radiculopathy, Cervical spinal stenosis, Cervicalgia, Chronic pain of both ankles, CKD (chronic kidney disease) stage 3, GFR 30-59 ml/min, COPD (chronic obstructive pulmonary disease), Depression, Essential hypertension, Folic acid deficiency, Foraminal stenosis of cervical region, Gastric reflux, Herniated disc, C5-6, Hyperlipidemia, Hypertension, Hyponatremia, Major depressive disorder, Mediastinal adenopathy, MVP (mitral valve prolapse), JIM (obstructive sleep apnea), Osteoarthritis, Slurred speech, and Ulnar neuropathy, the following items were adressed: medical records, medications, and changes to medical care and any changes can be found within the plan section of the note. A detailed listing of time spent for chronic care management has been scanned into the patient's electronic record. Current medications include: amlodipine 5 mg oral tablet, Breo Ellipta 200-25 mcg/dose inhalation blister with device, bupropion HCl 450 mg oral tablet extended release 24 hr, clonazepam 1 mg oral tablet, Incruse Ellipta 62.5 mcg/actuation inhalation blister with device, losartan  50 mg oral tablet, METOPROLOL SUCC  MG T 100 TAB, METOPROLOL SUCC ER 50 MG TA 50 TAB, nystatin 100,000 unit/gram topical powder, PANTOPRAZOLE SOD DR 40 MG T 40 TAB, Percocet  mg oral tablet, permethrin 5 % topical cream, primidone 50 mg oral tablet, turmeric 400 mg oral capsule, Vitamin D3 5,000 unit oral tablet, and Voltaren 1 % topical gel and the patient is reported to be compliant with medication protocol. Medications are reported to be effective. Regarding these diagnoses, patient is followed by pulmonology, neurology & neurosurgery, and pain management   The patient was monitored remotely for blood pressure, activity level, and blood glucose for a period of 21 minutes.   This patient's physical needs include: DME supplies. Patient does have gait dysfunction and utilizes a cane. he also has a walker if he needs them. He is followed by Neuro, pulmonology, and pain management.   Patient's mental support needs include: continued support. Patient does have anxiety depression. He is now on medications, Wellbutrin and klonopin, and he does well with both of those and feel like his symptoms are managed.   The patient's cognitive support needs are currently being met.   The patient's psychosocial support needs are N/A,   This patient's functional needs include: DME supplies. As discussed above.   This patient's environmental needs are N/A.           Assessment  · Chronic Care Management (CCM)     V68.89/Z02.89  · Anemia     285.9/D64.9  · Major depressive disorder     296.20/F32.2  · MVP (mitral valve prolapse)     424.0/I34.1  · JIM (obstructive sleep apnea)     327.23/G47.33  · COPD (chronic obstructive pulmonary disease)     496/J44.9      Plan  · Medications  o Medications have been Reconciled  o Transition of Care or Provider Policy  · Instructions  o Patient's Health Care Goals: decrease pain  o Provider's Health Care Goals: maintain tolerable level of pain, no falls; optimal breathing  pattern  o Patient was provided an electronic copy of care plan  o CCM services were explained and offered and patient has accepted these services.  o Patient has given their written consent to recieve CCM services and understands that this includes the authorization of electronic communication of medical information with other treating providers.  o Patient understands that they may stop CCM services at any time and these changes will be effective at the end of the calendar month and will effectively revocate the agreement of CCM services.  o Patient understands that only one practioner can furnish and be paid for CCM services during one calendar month. Patient also understands that there may be co-payment or deductible fees in association with CCM services.  o Patient will continue with at least monthly follow-up calls with the Nurse Navigator.  · Associate Tasks  o Task ID [id number] CCM: CCM            Electronically Signed by: Toshia Kessler RN -Author on December 30, 2020 09:41:12 AM

## 2021-05-12 NOTE — PROGRESS NOTES
Quick Note      Patient Name: Jose Rios   Patient ID: [id number]   Sex: Male   YOB: 1950    Primary Care Provider: Jagdeep PEREZ    Visit Date: March 30, 2020    Provider: Cherie Briscoe PA-C   Location: [facility]   Location Address: [address] [place] [address]  [id number]   Location Phone: [phone]          History Of Present Illness  TELEHEALTH VISIT  Chief Complaint: Follow up right total knee arthroplasty   Jose Rios is a 69 year old /White male who is presenting for evaluation via telehealth visit. Verbal consent obtained before beginning visit.   Provider spent 7 minutes with the patient during telehealth visit.   The following staff were present during this visit: Cherie Briscoe PA-C   Past Medical History/Overview of Patient Symptoms     He is s/p right TKA 11/22/19 by Dr. Zapata. He is doing well. His pain and motion are improving. He was not able to start PT, but does not feel it is necessary. He state incision is well healed. He denies erythema. He has some numbness about his incision.     A: right knee pain, presence of right TKA  He will follow up for repeat x-rays in 6 months.       Vitals  Date Time BP Position Site L\R Cuff Size HR RR TEMP (F) WT  HT  BMI kg/m2 BSA m2 O2 Sat HC       03/30/2020 10:44 AM         204lbs 16oz 6'   27.8 2.17               Assessment    Problems Reconciled  Plan  · Medications  o Medications have been Reconciled  o Transition of Care or Provider Policy  · Instructions  o Plan Of Care:             Electronically Signed by: Cherie Briscoe PA-C -Author on March 30, 2020 12:11:32 PM

## 2021-05-13 NOTE — PROGRESS NOTES
Progress Note      Patient Name: Jose Rios   Patient ID: [id number]   Sex: Male   YOB: 1950    Primary Care Provider: Jagdeep PEREZ   Referring Provider: Tayla Edward PA-C    Visit Date: December 4, 2020    Provider: CHRIS Toussaint   Location[facility]   Location Address: [address] [place] [address], [address] [place] [address]  [id number]   Location Phone: [phone]          Chief Complaint  · 3 m f/u depression and depression  · back pain      History Of Present Illness  Jose Rios is a 69 year old /White male who presents for evaluation and treatment of:   TELEHEALTH TELEPHONE VISIT  Jose Rios is a 69 year old /White male who is presenting for evaluation via telehealth telephone visit. Verbal consent obtained before beginning visit.   Provider spent 15 minutes with patient during telehealth visit.   The following staff were present during this visit: Gareth GILBERT, CHRIS Toussaint   Past Medical History/Overview of Patient Symptoms     Anxiety:  Doing well on clonazepam.    Hypertension:  doing well on medication.    Back pain has bee falling more.  Has had MRI and is now seeing Neurosurgeon. Not falling as much.    Arthritis:  cream is helping.       Past Medical History  Disease Name Date Onset Notes   Anemia 10/24/2016 --    Anxiety disorder 09/21/2017 He states that his anxiety has been worse. He has gone to [facility] in the past and tried SSRI's w/o much benefit. He states that Klonopin helped, but stopped going and then stopped the Klonopin. Restart the Klonopin.    Arthritis --  --    Bipolar affect, depressed --  --    Cervical radiculopathy 12/15/2015 --    Cervical spinal stenosis 12/15/2015 --    Cervicalgia 04/03/2018 --    Chronic Obstructive Pulmonary Disease --  --    Chronic pain of both ankles 02/02/2018 --    CKD (chronic kidney disease) stage 3, GFR 30-59 ml/min 02/02/2018 --    COPD (chronic  obstructive pulmonary disease) --  --    Depression --  --    Essential hypertension 07/29/2019 --    Folic acid deficiency 11/02/2017 --    Foraminal stenosis of cervical region 05/10/2018 --    Gastric reflux --  --    Head injury --  --    Herniated disc, C5-6 10/21/2014 right   Hyperlipidemia --  --    Hypertension --  --    Hyponatremia 10/03/2016 --    Hyponatremia 11/09/2015 --    Limb Swelling --  --    Lung disease --  --    Major depressive disorder 07/29/2019 --    Mediastinal adenopathy 10/03/2016 --    Migraine --  --    MVP (mitral valve prolapse) 10/07/2015 --    JIM (obstructive sleep apnea) 10/03/2016 --    Osteoarthritis 07/29/2019 --    Pneumonia --  --    Reflux --  --    Shortness of Breath --  --    Slurred speech 12/01/2016 --    Tremor of right hand 10/07/2015 --    Ulnar neuropathy 10/18/2018 clinical         Past Surgical History  Procedure Name Date Notes   Appendectomy --  --    Arm Surgery --  Nerve damage repair left arm   Back --  --    Cervical Fusion 11/5/2014, 3/11/2015 C5-6, C6-7   Cervical intervertebral disc surgery 1996 C5-6 fusion         Medication List  Name Date Started Instructions   amlodipine 5 mg oral tablet 07/06/2020 take 2 tablets (10 mg) by oral route once daily for 30 days   Breo Ellipta 200-25 mcg/dose inhalation blister with device  inhale 1 puff by inhalation route once daily at the same time each day   bupropion HCl 450 mg oral tablet extended release 24 hr 07/06/2020 take 1 tablet (450 mg) by oral route once daily swallowing whole. Do not crush, chew and/or divide. for 30 days   clonazepam 1 mg oral tablet 11/30/2020 take 1 tablet (1 mg) by oral route 2 times per day for 30 days   Incruse Ellipta 62.5 mcg/actuation inhalation blister with device  inhale 1 puff (62.5 mcg) by inhalation route once daily at the same time each day   losartan 50 mg oral tablet 07/06/2020 take 1 tablet (50 mg) by oral route 2 times per day for 30 days   METOPROLOL SUCC  MG T  100 TAB 11/30/2020 TAKE ONE TABLET BY MOUTH EVERY DAY   METOPROLOL SUCC ER 50 MG TA 50 TAB 10/28/2020 TAKE ONE TABLET BY MOUTH EVERY DAY ALONG WITH 100MG TABLET   nystatin 100,000 unit/gram topical powder 08/05/2019 apply to the affected area(s) by topical route 3 times per day   PANTOPRAZOLE SOD DR 40 MG T 40 TAB 11/30/2020 TAKE 1 TABLET (40 MG) BY ORAL ROUTE ONCE DAILY   Percocet  mg oral tablet 12/13/2019 take 1 tablet by oral route every 4 to 6 hours as needed   permethrin 5 % topical cream 01/31/2020 apply to affected area(s) by topical route 1X leave on for 8-14 hours.   primidone 50 mg oral tablet 08/26/2020 take 1 tablet by oral route 3 times a day for 90 days   turmeric 400 mg oral capsule  take 3 capsules by oral route daily for 30 days   Vitamin D3 5,000 unit oral tablet  take 1 tablet by oral route daily   Voltaren 1 % topical gel 08/21/2020 apply 2 gram to the affected area(s) by topical route 4 times per day         Allergy List  Allergen Name Date Reaction Notes   PENICILLINS --  --  --          Family Medical History  Disease Name Relative/Age Notes   Stroke Mother/   Mother   Heart Disease Father/  Mother/   Mother; Father  Father; Mother  grandparents/not specified   Cancer, Unspecified Brother/   Brother   Family history of certain chronic disabling diseases; arthritis Mother/   Mother   Family history of Arthritis Mother/   Mother         Social History  Finding Status Start/Stop Quantity Notes   Alcohol Never --/-- --  11/02/2020 -    lives with children --  --/-- --  --    lives with parents --  --/-- --  --    . --  --/-- --  --    Recreational Drug Use Never --/-- --  no   Retired. --  --/-- --  --    Tobacco Former --/-- --  11/02/2020 - former smoker  current every day smoker, 1 packs per day, smoked 31 or more years  42+ years Quit smoking August 2016         Immunizations  NameDate Admin Mfg Trade Name Lot Number Route Inj VIS Given VIS Publication   Umhswroez02/01/2020  SKB Fluarix, quadrivalent, preservative free [id number] NE NE 12/04/2020    Comments:    [id number]/01/2014 NE Not Entered  NE NE 07/29/2019    Comments:    Prevnar 1307/29/2019 [facility] PREVNAR 13 [id number] IM LD 07/29/2019    Comments: pt tolerated injection well         Review of Systems  · Constitutional  o Denies  o : fever, fatigue, weight loss, weight gain  · Cardiovascular  o Denies  o : lower extremity edema, claudication, chest pressure, palpitations  · Respiratory  o Denies  o : shortness of breath, wheezing, cough, hemoptysis, dyspnea on exertion  · Gastrointestinal  o Denies  o : nausea, vomiting, diarrhea, constipation, abdominal pain  · Musculoskeletal  o Admits  o : back pain      Physical Examination  · Neurologic  o Mental Status Examination  o : judgement, insight intact, modd and affect appropriate          Assessment  · Anxiety disorder     300.00/F41.9  · Essential hypertension     401.9/I10  · GERD (gastroesophageal reflux disease)     530.81/K21.9  · Lumbago     724.2/M54.5  · Osteoarthritis     715.90/M19.90      Plan  · Orders  o ACO-39: Current medications updated and reviewed (, 1159F) - - 12/04/2020  o ACO-14: Influenza immunization administered or previously received [facility] () - - 12/04/2020  o Physician Telephone Evaluation, 11-20 minutes (99700) - - 12/04/2020  · Medications  o Medications have been Reconciled  o Transition of Care or Provider Policy  · Instructions  o Patient was educated/instructed on their diagnosis, treatment and medications prior to discharge from the clinic today.  o Plan Of Care:             Electronically Signed by: CHRIS Toussaint -Author on December 4, 2020 11:11:44 AM

## 2021-05-13 NOTE — PROGRESS NOTES
Progress Note      Patient Name: Jose Rios   Patient ID: [id number]   Sex: Male   YOB: 1950    Primary Care Provider: Jagdeep PEREZ    Visit Date: September 3, 2020    Provider: CHRIS Toussaint   [place] [facility] [place] [address] [place] [address], [address]  [place], [place]  [id number]   Location Phone: [phone]          Chief Complaint     pt here for a 3 month f/u       History Of Present Illness  Jose Rios is a 69 year old /White male who presents for evaluation and treatment of:      3 month f/u.     States he has been having episodes where he feels like he is losing his balance. He denies any dizziness. He reports that he was falling 10-15 times over a span of 5 days and is no longer falling. He feels like it was from his back pain. His last fall was over a week ago. He has an apt with Dr. Rios today at 1pm. Denies wanting to doing any physical therapy, states it hurts too bad. He has a walker and a cane. Uses a cane most of the time, but is not using any assistive devices today. does not want to do therapy due to pain.  States his remember any triggers are having any issues that could have caused him to have the relation issues.  However he says it stopped as soon as it started.    COPD: soa with exertion. sees pulm annually. was going to pulm rehab but then had knee replacement and has not gone back    htn: 132/90 today. he does check it at home, usually 150's/80's. rechecked manually in office, 132/84. on losartan and metoprolol    anxiety and depression: feeling better with meds, takes klonopin and Wellbutrin. needs rf on klonopin today    Chronic Pain: sees [facility] pain mgmt.- no longer getting injections from them; they are prescribing the Percocet. He states that he Percocet is really the only thing that helps.    JIM: uses cpap machine, doing well with that    GERD: controlled with medication    has lab order,  just has not gotten them yet. he isn't fasting today       Review of Systems  · Constitutional  o Denies  o : fever, fatigue, weight loss, weight gain  · Cardiovascular  o Denies  o : lower extremity edema, claudication, chest pressure, palpitations  · Respiratory  o Denies  o : shortness of breath, wheezing, cough, hemoptysis, dyspnea on exertion  · Gastrointestinal  o Denies  o : nausea, vomiting, diarrhea, constipation, abdominal pain  · Musculoskeletal  o Denies  o : back pain      Vitals  Date Time BP Position Site L\R Cuff Size HR RR TEMP (F) WT  HT  BMI kg/m2 BSA m2 O2 Sat        09/03/2020 10:34 /90 Sitting    65 - R 16 97.7 221lbs 0oz 6'   29.97 2.26 95 %          Physical Examination  · Constitutional  o Appearance  o : well-nourished, well developed, alert, in no acute distress  · Eyes  o Conjunctivae  o : conjunctivae normal  o Sclerae  o : sclerae white  o Corneas  o : tear film normal, no lesions present  o Eyelids/Ocular Adnexae  o : eyelid appearance normal, no exudates present, eye moisture level normal  · Neck  o Inspection/Palpation  o : normal appearance, no masses or tenderness, trachea midline, no enlarged cervical or supraclavicular lymphnodes palpated  o Thyroid  o : gland size normal, nontender, no nodules or masses present on palpation, thyroid motion normal during swallowing  · Respiratory  o Respiratory Effort  o : breathing unlabored  o Inspection of Chest  o : normal appearance, no retractions  o Auscultation of Lungs  o : normal breath sounds throughout  · Cardiovascular  o Heart  o :   § Auscultation of Heart  § : regular rate and rhythm without murmur, PMI normal  o Peripheral Vascular System  o :   § Carotid Arteries  § : normal pulses bilaterally, no bruits present  § Pedal Pulses  § : pulses 2 bilaterally  § Extremities  § : no cyanosis, clubbing or edema; less than 2 second refill noted  · Musculoskeletal  o General  o : No joint swelling or deformity noted. Muscle tone,  strength and development grossly normal.  · Skin and Subcutaneous Tissue  o General Inspection  o : no rashes or lesions present, no areas of discoloration  · Neurologic  o Mental Status Examination  o : judgement, insight intact, modd and affect appropriate  o Motor Examination  o : strength grossly intact in all four extremities  o Gait and Station  o : normal gait, able to stand without difficulty          Assessment  · Screening for colon cancer     V76.51/Z12.11  · Anxiety disorder     300.00/F41.9  · COPD (chronic obstructive pulmonary disease)     496/J44.9  · Lumbago     724.2/M54.5  · Osteoarthritis     715.90/M19.90  · JIM (obstructive sleep apnea)     327.23/G47.33  · Depression     296.31  · Gastric reflux     530.81/K21.9  · Hypertension     401.9/I10  · Falls     E888.9/W19.XXXA       patient goes to see dr Rios today to discuss lumbago and falls. we will see what he states. offered PT to build up strength but patient states that it causes intolerable pain. encouraged patient to use his assistive devices' particularly, walker if he feels like he is going to start falling again       Plan  · Orders  o ACO-39: Current medications updated and reviewed () - - 09/03/2020  · Medications  o Medications have been Reconciled  o Transition of Care or Provider Policy  · Instructions  o Patient declines colorectal cancer screening. Discussed risks associated with not having screening performed.   o Patient was educated/instructed on their diagnosis, treatment and medications prior to discharge from the clinic today.  o Minutes spent with patient including greater than 50% in Education/Counseling/Care Coordination.  o Time spent with the patient was minutes, more than 50% face to face.  · Disposition  o Return in 3 months for F/U  o Will call with results and further testing            Electronically Signed by: CHRIS Toussaint -Author on September 3, 2020 01:42:12 PM

## 2021-05-13 NOTE — PROGRESS NOTES
Progress Note      Patient Name: Jose Rios   Patient ID: [id number]   Sex: Male   YOB: 1950    Primary Care Provider: Jagdeep PEREZ    Visit Date: October 8, 2020    Provider: Tayla Edward PA-C   Location: [facility]   Location Address: [address] [place] [address]  [id number]   Location Phone: [phone]          Chief Complaint     Follow up after imaging       History Of Present Illness     Here for f/u to discuss imaging.  MRI cervical spine showed prior ACDF with degenerative changes.  MRI lumbar spine showed a disc protrusion at L3/4 on the right with multilevel degenerative changes. Moderate central canal stenosis at L4/5 and L5/S1 secondary to facet hypertrophy. The MRI brain showed serpiginous area of increased FLAIR signal in the posterior right frontoparietal region most consistent with blood products within the gyri.  CT head recommended. Extensive chronic microvascular ischemia and generalized atrophy. Patient canceled CT head that was scheduled for October 5, 2020.  He continues to fall and trouble getting up and out of bed by himself.  Bladder incontinence because cannot get out of bed in time to get to the restroom.  Denies H/A, visual changes, N/V, but does describe generalized weakness and tired most of the time.  Wife states he lays in the bed the majority of the day.       Past Medical History  Anemia; Anxiety disorder; Arthritis; Bipolar affect, depressed; Cervical radiculopathy; Cervical spinal stenosis; Cervicalgia; Chronic Obstructive Pulmonary Disease; Chronic pain of both ankles; CKD (chronic kidney disease) stage 3, GFR 30-59 ml/min; COPD (chronic obstructive pulmonary disease); Depression; Essential hypertension; Folic acid deficiency; Foraminal stenosis of cervical region; Gastric reflux; Head injury; Herniated disc, C5-6; Hyperlipidemia; Hypertension; Hyponatremia; Hyponatremia; Limb Swelling; Lung disease; Major  depressive disorder; Mediastinal adenopathy; Migraine; MVP (mitral valve prolapse); JIM (obstructive sleep apnea); Osteoarthritis; Pneumonia; Reflux; Shortness of Breath; Slurred speech; Tremor of right hand; Ulnar neuropathy         Past Surgical History  Appendectomy; Arm Surgery; Back; Cervical Fusion; Cervical intervertebral disc surgery         Medication List  amlodipine 5 mg oral tablet; Breo Ellipta 200-25 mcg/dose inhalation blister with device; bupropion HCl 450 mg oral tablet extended release 24 hr; clonazepam 1 mg oral tablet; Incruse Ellipta 62.5 mcg/actuation inhalation blister with device; losartan 50 mg oral tablet; metoprolol succinate 100 mg oral tablet extended release 24 hr; metoprolol succinate 50 mg oral tablet extended release 24 hr; nystatin 100,000 unit/gram topical powder; Percocet  mg oral tablet; permethrin 5 % topical cream; primidone 50 mg oral tablet; Protonix 40 mg oral tablet,delayed release (DR/EC); turmeric 400 mg oral capsule; Vitamin D3 5,000 unit oral tablet; Voltaren 1 % topical gel         Allergy List  PENICILLINS       Allergies Reconciled  Family Medical History  Stroke; Heart Disease; Cancer, Unspecified; Family history of certain chronic disabling diseases; arthritis; Family history of Arthritis         Social History  Alcohol (Never); Alcohol Use (Never); lives with children; lives with parents; lives with spouse; .; Recreational Drug Use (Never); Retired.; Tobacco (Current some day)         Immunizations  Name Date Admin   Influenza 10/01/2019   Influenza 10/01/2018   Influenza 10/01/2018   Influenza 10/01/2018   Influenza 10/16/2017   Evcfyiohl35 08/01/2014   Ydjoplzww75 08/01/2014   Fwbthrros11 08/01/2014   Prevnar 13 07/29/2019   Prevnar 13 07/29/2019   Prevnar 13 07/29/2019         Review of Systems  · Constitutional  o Admits  o : fatigue  o Denies  o : chills, excessive sweating, fever, sycope/passing out, weight gain, weight  loss  · Eyes  o Denies  o : changes in vision, blurry vision, double vision  · HENT  o Admits  o : loss of hearing  o Denies  o : ringing in the ears, ear aches, sore throat, nasal congestion, sinus pain, nose bleeds, seasonal allergies  · Cardiovascular  o Admits  o : easy burising or bleeding  o Denies  o : blood clots, swollen legs, anemia, transfusions  · Respiratory  o Admits  o : shortness of breath, COPD  o Denies  o : dry cough, productive cough, pneumonia  · Gastrointestinal  o Admits  o : reflux  o Denies  o : difficulty swallowing  · Genitourinary  o Admits  o : incontinence, erectile dysfunction  · Neurologic  o Admits  o : tremor, loss of balance, falls, difficulty with coordination  o Denies  o : seizure, stroke, dizziness/vertigo, difficulty with sleep, numbness/tingling/paresthesia   · Musculoskeletal  o Admits  o : neck stiffness/pain, joint pain, weakness, sciatica, low back pain  o Denies  o : swollen lymph nodes, muscle aches, pain radiating in arm  · Endocrine  o Denies  o : diabetes, thyroid disorder  · Psychiatric  o Admits  o : anxiety, depression  · All Others Negative      Vitals  Date Time BP Position Site L\R Cuff Size HR RR TEMP (F) WT  HT  BMI kg/m2 BSA m2 O2 Sat FR L/min FiO2 HC       10/08/2020 11:26 AM        97.3 218lbs 0oz 6'   29.57 2.24             Physical Examination     He appears to have generalized weakness.  Slow to go from sitting to standing and gait appears slightly shuffled.           Assessment  · Loss of balance     781.99/R26.89  · Falls frequently     V15.88/R29.6  · Cervicalgia     723.1/M54.2  · Fusion of spine of cervical region     724.9/M43.22  · Low back pain     724.2/M54.5  · Abnormal MRI of head     793.0/R93.0  concern for brain bleed      Plan  · Orders  o NEUROLOGY CONSULTATION. (NEURO) - 781.99/R26.89, V15.88/R29.6 - 10/08/2020   refer to Dr. Deleon  · Medications  o Medications have been Reconciled  o Transition of Care or Provider  "Policy  · Instructions  o I ordered stat CT head today scheduled for 2:30 pm. Dr. Rios is aware of this plan. I will call the patient and his wife with results. He has a disc protrusion off to the right at L3/4 and moderate central canal stenosis at L4/5 and L5/S1. Non-operative pathology on MRI cervical spine. CT head was negative for intracranial hemorrhage. I notified his wife of findings. I will refer him to neurology for further work up for his gait instability and frequent falls.   · Associate Tasks  o Task ID [id number] \"''Provider to Nurse: refer to Dr. Deleon for consult for loss of balance and frequent falls            Electronically Signed by: Tayla Edward PA-C -Author on October 8, 2020 04:32:57 PM  "

## 2021-05-13 NOTE — PROGRESS NOTES
Progress Note      Patient Name: Jose Rios   Patient ID: [id number]   Sex: Male   YOB: 1950    Primary Care Provider: Jagdeep PEREZ   Referring Provider: Tayla Edward PA-C    Visit Date: November 19, 2020    Provider: CHRIS Toussaint   Location: [facility]   Location Address: [address] [place] [address], [address]  [place], [place]  [id number]   Location Phone: [phone]          Chief Complaint     The patient is here to discuss labs done at neurology.       History Of Present Illness  Jose Rios is a 69 year old /White male who presents for evaluation and treatment of:      polyarthralgia.  Has pain in most of his joints.    Balance has gotten better and dizziness gone over the last 3 weeks.      Sodium low but patient has been drinking a lot.  discussed limiting fluids to increase sodium.     states at times when walking can't slow down.  Not sure what causes.  Comes and goes but has been better over the last few week.s       Past Medical History  Disease Name Date Onset Notes   Anemia 10/24/2016 --    Anxiety disorder 09/21/2017 He states that his anxiety has been worse. He has gone to [facility] in the past and tried SSRI's w/o much benefit. He states that Klonopin helped, but stopped going and then stopped the Klonopin. Restart the Klonopin.    Arthritis --  --    Bipolar affect, depressed --  --    Cervical radiculopathy 12/15/2015 --    Cervical spinal stenosis 12/15/2015 --    Cervicalgia 04/03/2018 --    Chronic Obstructive Pulmonary Disease --  --    Chronic pain of both ankles 02/02/2018 --    CKD (chronic kidney disease) stage 3, GFR 30-59 ml/min 02/02/2018 --    COPD (chronic obstructive pulmonary disease) --  --    Depression --  --    Essential hypertension 07/29/2019 --    Folic acid deficiency 11/02/2017 --    Foraminal stenosis of cervical region 05/10/2018 --    Gastric reflux --  --    Head injury --  --    Herniated disc, C5-6  10/21/2014 right   Hyperlipidemia --  --    Hypertension --  --    Hyponatremia 10/03/2016 --    Hyponatremia 11/09/2015 --    Limb Swelling --  --    Lung disease --  --    Major depressive disorder 07/29/2019 --    Mediastinal adenopathy 10/03/2016 --    Migraine --  --    MVP (mitral valve prolapse) 10/07/2015 --    JIM (obstructive sleep apnea) 10/03/2016 --    Osteoarthritis 07/29/2019 --    Pneumonia --  --    Reflux --  --    Shortness of Breath --  --    Slurred speech 12/01/2016 --    Tremor of right hand 10/07/2015 --    Ulnar neuropathy 10/18/2018 clinical         Past Surgical History  Procedure Name Date Notes   Appendectomy --  --    Arm Surgery --  Nerve damage repair left arm   Back --  --    Cervical Fusion 11/5/2014, 3/11/2015 C5-6, C6-7   Cervical intervertebral disc surgery 1996 C5-6 fusion         Medication List  Name Date Started Instructions   amlodipine 5 mg oral tablet 07/06/2020 take 2 tablets (10 mg) by oral route once daily for 30 days   Breo Ellipta 200-25 mcg/dose inhalation blister with device  inhale 1 puff by inhalation route once daily at the same time each day   bupropion HCl 450 mg oral tablet extended release 24 hr 07/06/2020 take 1 tablet (450 mg) by oral route once daily swallowing whole. Do not crush, chew and/or divide. for 30 days   clonazepam 1 mg oral tablet 10/22/2020 take 1 tablet (1 mg) by oral route 2 times per day for 30 days   Incruse Ellipta 62.5 mcg/actuation inhalation blister with device  inhale 1 puff (62.5 mcg) by inhalation route once daily at the same time each day   losartan 50 mg oral tablet 07/06/2020 take 1 tablet (50 mg) by oral route 2 times per day for 30 days   METOPROLOL SUCC ER 50 MG TA 50 TAB 10/28/2020 TAKE ONE TABLET BY MOUTH EVERY DAY ALONG WITH 100MG TABLET   metoprolol succinate 100 mg oral tablet extended release 24 hr 06/25/2020 take 1 tablet (100 mg) by oral route once daily for 90 days   nystatin 100,000 unit/gram topical powder  08/05/2019 apply to the affected area(s) by topical route 3 times per day   Percocet  mg oral tablet 12/13/2019 take 1 tablet by oral route every 4 to 6 hours as needed   permethrin 5 % topical cream 01/31/2020 apply to affected area(s) by topical route 1X leave on for 8-14 hours.   primidone 50 mg oral tablet 08/26/2020 take 1 tablet by oral route 3 times a day for 90 days   Protonix 40 mg oral tablet,delayed release (DR/EC) 09/21/2020 take 1 tablet (40 mg) by oral route once daily for 30 days   turmeric 400 mg oral capsule  take 3 capsules by oral route daily for 30 days   Vitamin D3 5,000 unit oral tablet  take 1 tablet by oral route daily   Voltaren 1 % topical gel 08/21/2020 apply 2 gram to the affected area(s) by topical route 4 times per day         Allergy List  Allergen Name Date Reaction Notes   PENICILLINS --  --  --          Family Medical History  Disease Name Relative/Age Notes   Stroke Mother/   Mother   Heart Disease Father/  Mother/   Mother; Father  Father; Mother  grandparents/not specified   Cancer, Unspecified Brother/   Brother   Family history of certain chronic disabling diseases; arthritis Mother/   Mother   Family history of Arthritis Mother/   Mother         Social History  Finding Status Start/Stop Quantity Notes   Alcohol Never --/-- --  11/02/2020 -    lives with children --  --/-- --  --    lives with parents --  --/-- --  --    . --  --/-- --  --    Recreational Drug Use Never --/-- --  no   Retired. --  --/-- --  --    Tobacco Current some day --/-- --  11/02/2020 - former smoker  current every day smoker, 1 packs per day, smoked 31 or more years  42+ years Quit smoking August 2016         Immunizations  NameDate Admin Mfg Trade Name Lot Number Route Inj VIS Given VIS Publication   [id number]/01/2019 SKB Fluarix, quadrivalent, preservative free [id number] NE NE 10/23/2019    Comments:    [id number]/01/2014 NE Not Entered  NE NE 07/29/2019    Comments:    Prevnar  1307/29/2019 WAL PREVNAR 13 [id number] IM LD 07/29/2019    Comments: pt tolerated injection well         Review of Systems  · Constitutional  o Denies  o : fever, fatigue, weight loss, weight gain  · Cardiovascular  o Denies  o : lower extremity edema, claudication, chest pressure, palpitations  · Respiratory  o Denies  o : shortness of breath, wheezing, cough, hemoptysis, dyspnea on exertion  · Gastrointestinal  o Denies  o : nausea, vomiting, diarrhea, constipation, abdominal pain      Vitals  Date Time BP Position Site L\R Cuff Size HR RR TEMP (F) WT  HT  BMI kg/m2 BSA m2 O2 Sat FR L/min FiO2 HC       11/19/2020 10:11 /76 Sitting    48 - R 16 97.6 222lbs 16oz 6'   30.24 2.27 96 %  21%    11/19/2020 10:11 /76 Sitting    48 - R 16 97.6 222lbs 16oz 6'   30.24 2.27 96 %  21%          Physical Examination  · Constitutional  o Appearance  o : well-nourished, well developed, alert, in no acute distress  · Eyes  o Conjunctivae  o : conjunctivae normal  o Sclerae  o : sclerae white  o Pupils and Irises  o : pupils equal, round, and reactive to light and accommodation bilaterally  o Corneas  o : tear film normal, no lesions present  o Eyelids/Ocular Adnexae  o : eyelid appearance normal, no exudates present, eye moisture level normal  · Respiratory  o Respiratory Effort  o : breathing unlabored  o Inspection of Chest  o : normal appearance, no retractions  o Auscultation of Lungs  o : normal breath sounds throughout  · Cardiovascular  o Heart  o :   § Auscultation of Heart  § : regular rate and rhythm without murmur, PMI normal  o Peripheral Vascular System  o :   § Extremities  § : no cyanosis, clubbing or edema; less than 2 second refill noted  · Musculoskeletal  o General  o : No joint swelling or deformity noted. Muscle tone, strength and development grossly normal.  · Skin and Subcutaneous Tissue  o General Inspection  o : no rashes or lesions present, no areas of discoloration  · Neurologic  o Mental Status  Examination  o : judgement, insight intact, modd and affect appropriate  o Motor Examination  o : strength grossly intact in all four extremities  o Gait and Station  o : normal gait, able to stand without difficulty          Results  · In-Office Procedures  o Lab procedure  § IOP - Urine Drug Screen In-House [facility] ([id number])   § Amphetamines Ur Ql: Negative   § Barbiturates Ur Ql: Positive   § Buprenorphine+Nor Ur Ql Scn: Negative   § Benzodiaz Ur Ql: Positive   § Cocaine Ur Ql: Negative   § Methadone Ur Ql: Negative   § Methamphet Ur Ql: Negative   § MDMA Ur Ql Scn: Negative   § Opiates Ur Ql: Negative   § Oxycodone Ur Ql: Positive   § PCP Ur Ql: Negative   § THC Ur Ql: Negative   § Temp in Range?: Within/Acceptable   § Control Seen?: Yes       Assessment  · Impaired fasting glucose     790.21/R73.01  · Polyarthralgia     719.49/M25.50      Plan  · Orders  o Hgb A1c [facility] ([id number]) - 790.21/R73.01 - 11/19/2020  o Uric Acid (Serum) (31326) - 719.49/M25.50 - 11/19/2020  o JEREL Report (KASPR) - - 11/19/2020  o ACO-39: Current medications updated and reviewed (, 1159F) - - 11/19/2020  o ACO-14: Influenza immunization administered or previously received [facility] () - - 11/19/2020  o Rheumatoid Factor IgG, IgM, and IgA [facility] ([id number]) - 719.49/M25.50 - 11/19/2020  · Medications  o Medications have been Reconciled  o Transition of Care or Provider Policy  · Instructions  o Patient was educated/instructed on their diagnosis, treatment and medications prior to discharge from the clinic today.  o Minutes spent with patient including greater than 50% in Education/Counseling/Care Coordination.  o Time spent with the patient was minutes, more than 50% face to face.  · Disposition  o Will call with results and further testing            Electronically Signed by: CHRIS Toussaint -Author on November 19, 2020 11:15:05 AM

## 2021-05-13 NOTE — PROGRESS NOTES
Progress Note      Patient Name: Jose Rios   Patient ID: [id number]   Sex: Male   YOB: 1950    Primary Care Provider: Jagdeep PEREZ    Visit Date: September 3, 2020    Provider: Tayla Edward PA-C   Location: [facility]   Location Address: [address] [place] [address]  [id number]   Location Phone: [phone]          Chief Complaint  · Follow-up      History Of Present Illness  The patient is a 69 year old /White male who is in the office for followup appointment.      Veers to the side when he's walking and falling frequently at his home.  Denies dizziness.  He's had a prior ACDF at C5/6 and C7/T1 in 2014 and 2015.  Denies N/V, headaches, no visual changes.  LBP for many years and getting worse.  He has some neck pain.  Some pain in the lower leg on the right.  Denies leg weakness. Denies bowel/bladder incontinence. His lbp is constant and worse with prolonged standing and walking.       Past Medical History  Anemia; Anxiety disorder; Arthritis; Bipolar affect, depressed; Cervical radiculopathy; Cervical spinal stenosis; Cervicalgia; Chronic Obstructive Pulmonary Disease; Chronic pain of both ankles; CKD (chronic kidney disease) stage 3, GFR 30-59 ml/min; COPD (chronic obstructive pulmonary disease); Depression; Essential hypertension; Folic acid deficiency; Foraminal stenosis of cervical region; Gastric reflux; Head injury; Herniated disc, C5-6; Hyperlipidemia; Hypertension; Hyponatremia; Hyponatremia; Limb Swelling; Lung disease; Major depressive disorder; Mediastinal adenopathy; Migraine; MVP (mitral valve prolapse); JIM (obstructive sleep apnea); Osteoarthritis; Pneumonia; Reflux; Shortness of Breath; Slurred speech; Tremor of right hand; Ulnar neuropathy         Past Surgical History  Appendectomy; Arm Surgery; Back; Cervical Fusion; Cervical intervertebral disc surgery         Medication List  amlodipine 5 mg oral tablet; Breo  Ellipta 200-25 mcg/dose inhalation blister with device; bupropion HCl 450 mg oral tablet extended release 24 hr; clonazepam 1 mg oral tablet; Incruse Ellipta 62.5 mcg/actuation inhalation blister with device; losartan 50 mg oral tablet; metoprolol succinate 100 mg oral tablet extended release 24 hr; metoprolol succinate 50 mg oral tablet extended release 24 hr; nystatin 100,000 unit/gram topical powder; Percocet  mg oral tablet; permethrin 5 % topical cream; primidone 50 mg oral tablet; Protonix 40 mg oral tablet,delayed release (DR/EC); turmeric 400 mg oral capsule; Vitamin D3 5,000 unit oral tablet; Voltaren 1 % topical gel         Allergy List  PENICILLINS       Allergies Reconciled  Family Medical History  Stroke; Heart Disease; Cancer, Unspecified; Family history of certain chronic disabling diseases; arthritis; Family history of Arthritis         Social History  Alcohol (Never); Alcohol Use (Never); lives with children; lives with parents; lives with spouse; .; Recreational Drug Use (Never); Retired.; Tobacco (Former)         Immunizations  Name Date Admin   Influenza    Influenza    Influenza    Influenza    Influenza    [id number]    [id number]    [id number]    Prevnar 13    Prevnar 13    Prevnar 13          Review of Systems  · Constitutional  o Admits  o : fatigue, weight loss  o Denies  o : chills, excessive sweating, fever, sycope/passing out, weight gain  · Eyes  o Denies  o : changes in vision, blurry vision, double vision  · HENT  o Denies  o : loss of hearing, ringing in the ears, ear aches, sore throat, nasal congestion, sinus pain, nose bleeds, seasonal allergies  · Cardiovascular  o Admits  o : easy burising or bleeding  o Denies  o : blood clots, swollen legs, anemia, transfusions  · Respiratory  o Admits  o : shortness of breath, COPD  o Denies  o : dry cough, productive cough, pneumonia  · Gastrointestinal  o Admits  o : reflux  o Denies  o : difficulty  swallowing  · Genitourinary  o Admits  o : erectile dysfunction  o Denies  o : incontinence  · Neurologic  o Admits  o : headache, tremor, loss of balance, falls, difficulty with sleep  o Denies  o : seizure, stroke, dizziness/vertigo, numbness/tingling/paresthesia , difficulty with coordination, difficulty with dexterity, weakness  · Musculoskeletal  o Admits  o : neck stiffness/pain, muscle aches, joint pain, sciatica, pain radiating in leg, low back pain  o Denies  o : swollen lymph nodes, weakness, spasms, pain radiating in arm  · Endocrine  o Denies  o : diabetes, thyroid disorder  · Psychiatric  o Admits  o : anxiety, depression  · All Others Negative      Vitals  Date Time BP Position Site L\R Cuff Size HR RR TEMP (F) WT  HT  BMI kg/m2 BSA m2 O2 Sat HC       09/03/2020 10:34 /90 Sitting    65 - R 16 97.7 221lbs 0oz 6'   29.97 2.26 95 %    09/03/2020 12:47 PM        97.9 220lbs 6oz 6'   29.89 2.25           Physical Examination  · Constitutional  o Appearance  o : well-nourished, well developed, alert, in no acute distress  · Respiratory  o Respiratory Effort  o : breathing unlabored  · Cardiovascular  o Peripheral Vascular System  o :   § Extremities  § : no cyanosis, clubbing or edema  · Neurologic  o Mental Status Examination  o :   § Orientation  § : grossly oriented to person, place and time  o Motor Examination  o :   § RUE Strength  § : strength normal  § RUE Motor Function  § : tone normal  § LUE Strength  § : strength normal  § LUE Motor Function  § : tone normal  § RLE Strength  § : strength normal  § RLE Motor Function  § : tone normal, no atrophy, no abnormal movements noted  § LLE Strength  § : strength normal  § LLE Motor Function  § : tone normal, no atrophy, no abnormal movements noted  o Reflexes  o :   § RUE  § : biceps reflex 1, triceps reflex 1, brachioradialis reflex 1   § LUE  § : biceps reflex 1, triceps reflex 1, brachioradialis reflex 1   § RLE  § : 0/4 knee and ankle  reflex  § LLE  § : 1/4 knee and ankle reflex  o Sensation  o :   § Light Touch  § : sensation intact to light touch in extremities  o Gait and Station  o :   § Gait Screening  § : gait is a little ataxic  · Psychiatric  o Mood and Affect  o : mood normal, affect appropriate          Assessment  · Loss of balance     781.99/R26.89  · Falls frequently     V15.88/R29.6  · Cervicalgia     723.1/M54.2  · Fusion of spine of cervical region     724.9/M43.22  · Low back pain     724.2/M54.5    Problems Reconciled  Plan  · Orders  o MRI brain with and without contrast (00134) - 781.99/R26.89, V15.88/R29.6 - 09/03/2020   [facility]  o MRI cervical spine wo contrast (60706) - 781.99/R26.89, V15.88/R29.6, 723.1/M54.2, 724.9/M43.22 - 09/03/2020   [facility]  o MRI lumbar spine wo contrast (64068) - 781.99/R26.89, V15.88/R29.6, 724.2/M54.5 - 09/03/2020   [facility]  · Medications  o Medications have been Reconciled  o Transition of Care or Provider Policy  · Instructions  o Encouraged to follow-up with Primary Care Provider for preventative care.  o The ROS and the PFSH were reviewed at today's visit.  o Call or return to office if symptoms worsen or persist.   o I will send him for imaging and f/u to discuss results.             Electronically Signed by: Tayla Edward PA-C -Author on September 3, 2020 01:43:45 PM

## 2021-05-13 NOTE — PROGRESS NOTES
Progress Note      Patient Name: Jose Rios   Patient ID: [id number]   Sex: Male   YOB: 1950    Primary Care Provider: Jagdeep PEREZ    Visit Date: June 2, 2020    Provider: CHRIS Toussaint   Location: [facility]   Location Address: [address] [place] [address], [address] [place] [address]  [id number]   Location Phone: [phone]          Chief Complaint     The patient is here for a three month f/u of htn, depression, anxiety, arthralgia. GERD       History Of Present Illness  Jose Rios is a 69 year old /White male who presents for evaluation and treatment of:      Here for 3-month follow-up.  His anxiety depression is doing well for the most part he has a few down days but no anything this morning expected.    Renal insufficiency: Doing well off of his diclofenac and his levels are starting to return to normal.    Hyponatremia.  He states he has been hyponatremic since the 90s.  And it fluctuates between now in the 130s.    Vitamin D is doing well he is not feeling fatigued at all.    Hypertension: Doing well on his metoprolol and his amlodipine his blood pressure today is 110/64.       Past Medical History  Disease Name Date Onset Notes   Anemia 10/24/2016 --    Anxiety disorder 09/21/2017 He states that his anxiety has been worse. He has gone to [facility] in the past and tried SSRI's w/o much benefit. He states that Klonopin helped, but stopped going and then stopped the Klonopin. Restart the Klonopin.    Arthritis --  --    Bipolar affect, depressed --  --    Cervical radiculopathy 12/15/2015 --    Cervical spinal stenosis 12/15/2015 --    Cervicalgia 04/03/2018 --    Chronic Obstructive Pulmonary Disease --  --    Chronic pain of both ankles 02/02/2018 --    CKD (chronic kidney disease) stage 3, GFR 30-59 ml/min 02/02/2018 --    COPD (chronic obstructive pulmonary disease) --  --    Depression --  --    Essential hypertension 07/29/2019 --    Folic acid deficiency  11/02/2017 --    Foraminal stenosis of cervical region 05/10/2018 --    Gastric reflux --  --    Head injury --  --    Herniated disc, C5-6 10/21/2014 right   Hyperlipidemia --  --    Hypertension --  --    Hyponatremia 10/03/2016 --    Hyponatremia 11/09/2015 --    Limb Swelling --  --    Lung disease --  --    Major depressive disorder 07/29/2019 --    Mediastinal adenopathy 10/03/2016 --    Migraine --  --    MVP (mitral valve prolapse) 10/07/2015 --    JIM (obstructive sleep apnea) 10/03/2016 --    Osteoarthritis 07/29/2019 --    Pneumonia --  --    Reflux --  --    Shortness of Breath --  --    Slurred speech 12/01/2016 --    Tremor of right hand 10/07/2015 --    Ulnar neuropathy 10/18/2018 clinical         Past Surgical History  Procedure Name Date Notes   Appendectomy --  --    Arm Surgery --  Nerve damage repair left arm   Back --  --    Cervical Fusion 11/5/2014, 3/11/2015 C5-6, C6-7   Cervical intervertebral disc surgery 1996 C5-6 fusion         Medication List  Name Date Started Instructions   amlodipine 5 mg oral tablet 10/23/2019 take 2 tablets (10 mg) by oral route once daily for 30 days   Breo Ellipta 200-25 mcg/dose inhalation blister with device  inhale 1 puff by inhalation route once daily at the same time each day   bupropion HCl 450 mg oral tablet extended release 24 hr 01/29/2020 take 1 tablet (450 mg) by oral route once daily swallowing whole. Do not crush, chew and/or divide. for 30 days   clonazepam 1 mg oral tablet 05/18/2020 take 1 tablet (1 mg) by oral route 2 times per day for 30 days   diclofenac sodium 75 mg oral tablet,delayed release (DR/EC) 02/17/2020 take 1 tablet (75 mg) by oral route 2 times per day   Incruse Ellipta 62.5 mcg/actuation inhalation blister with device  inhale 1 puff (62.5 mcg) by inhalation route once daily at the same time each day   losartan 50 mg oral tablet 01/29/2020 take 1 tablet (50 mg) by oral route 2 times per day for 30 days   metoprolol succinate 100 mg  oral tablet extended release 24 hr 11/13/2019 take 1 tablet (100 mg) by oral route once daily for 90 days   metoprolol succinate 50 mg oral tablet extended release 24 hr 05/08/2020 take 1 tablet by oral route QD with 100mg ER tab   nystatin 100,000 unit/gram topical powder 08/05/2019 apply to the affected area(s) by topical route 3 times per day   Percocet  mg oral tablet 12/13/2019 take 1 tablet by oral route every 4 to 6 hours as needed   permethrin 5 % topical cream 01/31/2020 apply to affected area(s) by topical route 1X leave on for 8-14 hours.   primidone 50 mg oral tablet 07/19/2019 take 1 tablet by oral route 3 times a day for 90 days   Protonix 40 mg oral tablet,delayed release (DR/EC) 03/23/2020 take 1 tablet (40 mg) by oral route once daily for 30 days   turmeric 400 mg oral capsule  take 3 capsules by oral route daily for 30 days   Vitamin D3 5,000 unit oral tablet  take 1 tablet by oral route daily   Voltaren 1 % topical gel 07/30/2019 apply 2 gram to the affected area(s) by topical route 4 times per day         Allergy List  Allergen Name Date Reaction Notes   PENICILLINS --  --  --        Allergies Reconciled  Family Medical History  Disease Name Relative/Age Notes   Stroke Mother/   Mother   Heart Disease Father/  Mother/   Mother; Father  Father; Mother  grandparents/not specified   Cancer, Unspecified Brother/   Brother   Family history of certain chronic disabling diseases; arthritis Mother/   Mother   Family history of Arthritis Mother/   Mother         Social History  Finding Status Start/Stop Quantity Notes   Alcohol Never --/-- --  --    Alcohol Use Never --/-- --  does not drink   lives with children --  --/-- --  --    lives with parents --  --/-- --  --    lives with spouse --  --/-- --  --    . --  --/-- --  --    Recreational Drug Use Never --/-- --  no   Retired. --  --/-- --  --    Tobacco Former --/-- --  former smoker  current every day smoker, 1 packs per day, smoked  31 or more years  42+ years Quit smoking August 2016         Immunizations  NameDate Admin Mfg Trade Name Lot Number Route Inj VIS Given VIS Publication   Influenza10/01/2019 [facility] Fluarix, quadrivalent, preservative free [id number] NE NE 10/23/2019    Comments:    Influenza10/01/2018 [employer]B Fluarix, quadrivalent, preservative free [id number] NE NE 07/29/2019    Comments:    Influenza10/01/2018 [employer]B Fluarix, quadrivalent, preservative free [id number] NE NE 07/29/2019    Comments:    Influenza10/01/2018 SKB Fluarix, quadrivalent, preservative free [id number] NE NE 07/29/2019    Comments:    Smzijklzj90/16/2017 SKB Fluarix, quadrivalent, preservative free [id number] NE NE 11/02/2017 08/15/2015   Comments: flu vaccine given at [facility]   Tmggzidbg3215/01/2014 NE Not Entered  NE NE 07/29/2019    Comments:    Ryvdpgvjf6305/01/2014 NE Not Entered  NE NE 07/29/2019    Comments:    Mpyskgqys6308/01/2014 NE Not Entered  NE NE 07/29/2019    Comments:    Prevnar 1307/29/2019 [facility] PREVNAR 13 [id number] IM LD 07/29/2019    Comments: pt tolerated injection well   Prevnar 1307/29/2019 [facility] PREVNAR 13 [id number] IM LD 07/29/2019    Comments: pt tolerated injection well   Prevnar 1307/29/2019 [facility] PREVNAR 13 [id number] IM LD 07/29/2019    Comments: pt tolerated injection well         Review of Systems  · Constitutional  o Denies  o : fever, fatigue, weight loss, weight gain  · Cardiovascular  o Denies  o : lower extremity edema, claudication, chest pressure, palpitations  · Respiratory  o Denies  o : shortness of breath, wheezing, cough, hemoptysis, dyspnea on exertion  · Gastrointestinal  o Denies  o : nausea, vomiting, diarrhea, constipation, abdominal pain      Vitals  Date Time BP Position Site L\R Cuff Size HR RR TEMP (F) WT  HT  BMI kg/m2 BSA m2 O2 Sat        06/02/2020 10:40 /64 Sitting    55 - R 16 97.9 223lbs 16oz 6'   30.38 2.27 95 %          Physical Examination  · Constitutional  o Appearance  o : well-nourished, well developed, alert, in no acute  distress  · Eyes  o Conjunctivae  o : conjunctivae normal  o Sclerae  o : sclerae white  o Pupils and Irises  o : pupils equal, round, and reactive to light and accommodation bilaterally  o Corneas  o : tear film normal, no lesions present  o Eyelids/Ocular Adnexae  o : eyelid appearance normal, no exudates present, eye moisture level normal  · Ears, Nose, Mouth and Throat  o Ears  o : external ear auricle normal, otic canal normal, TM with no reddness, effusion, retraction  o Nose  o : external normal, nasal mucosa normal, turbinates normal  o Oral Cavity  o : tongue no lesion, oral mucosa normal  o Throat  o : no erythemia, exudate or lesions  · Neck  o Inspection/Palpation  o : normal appearance, no masses or tenderness, trachea midline, no enlarged cervical or supraclavicular lymphnodes palpated  o Thyroid  o : gland size normal, nontender, no nodules or masses present on palpation, thyroid motion normal during swallowing  · Respiratory  o Respiratory Effort  o : breathing unlabored  o Inspection of Chest  o : normal appearance, no retractions  o Auscultation of Lungs  o : normal breath sounds throughout  · Cardiovascular  o Heart  o :   § Auscultation of Heart  § : regular rate and rhythm without murmur, PMI normal  o Peripheral Vascular System  o :   § Carotid Arteries  § : normal pulses bilaterally, no bruits present  § Pedal Pulses  § : pulses 2 bilaterally  § Extremities  § : no cyanosis, clubbing or edema; less than 2 second refill noted  · Musculoskeletal  o General  o : No joint swelling or deformity noted. Muscle tone, strength and development grossly normal.  · Skin and Subcutaneous Tissue  o General Inspection  o : no rashes or lesions present, no areas of discoloration  · Neurologic  o Mental Status Examination  o : judgement, insight intact, modd and affect appropriate  o Motor Examination  o : strength grossly intact in all four extremities  o Gait and Station  o : normal gait, able to stand  without difficulty          Assessment  · Anxiety disorder     300.00/F41.9  · Depression     311/F32.9  · Essential hypertension     401.9/I10  · GERD (gastroesophageal reflux disease)     530.81/K21.9  · Osteoarthritis     715.90/M19.90  · Vitamin D deficiency     268.9/E55.9    Problems Reconciled  Plan  · Orders  o HTN/Lipid Panel (CMP, Lipid) [facility] (99219, 61422) - 401.9/I10 - 09/02/2020  o CBC with Auto Diff [facility] (36004) - 401.9/I10 - 09/02/2020  o Urinalysis with Reflex Microscopy if abnormal ([facility]) (80633) - 401.9/I10 - 09/02/2020  o Vitamin D Level (24016) - 268.9/E55.9 - 09/02/2020  o ACO-39: Current medications updated and reviewed () - - 06/02/2020  o ACO-14: Influenza immunization administered or previously received () - - 06/02/2020  · Medications  o Medications have been Reconciled  o Transition of Care or Provider Policy  · Instructions  o Patient was educated/instructed on their diagnosis, treatment and medications prior to discharge from the clinic today.            Electronically Signed by: CHRIS Toussaint -Author on June 2, 2020 11:02:37 AM

## 2021-05-14 VITALS
HEIGHT: 72 IN | RESPIRATION RATE: 16 BRPM | OXYGEN SATURATION: 96 % | SYSTOLIC BLOOD PRESSURE: 152 MMHG | HEART RATE: 58 BPM | BODY MASS INDEX: 29.93 KG/M2 | DIASTOLIC BLOOD PRESSURE: 94 MMHG | TEMPERATURE: 96.9 F | WEIGHT: 221 LBS

## 2021-05-14 VITALS
WEIGHT: 221 LBS | OXYGEN SATURATION: 95 % | HEART RATE: 65 BPM | DIASTOLIC BLOOD PRESSURE: 90 MMHG | RESPIRATION RATE: 16 BRPM | SYSTOLIC BLOOD PRESSURE: 132 MMHG | BODY MASS INDEX: 29.93 KG/M2 | HEIGHT: 72 IN | TEMPERATURE: 97.7 F

## 2021-05-14 VITALS — WEIGHT: 220.37 LBS | TEMPERATURE: 97.9 F | BODY MASS INDEX: 29.85 KG/M2 | HEIGHT: 72 IN

## 2021-05-14 VITALS
TEMPERATURE: 97.6 F | SYSTOLIC BLOOD PRESSURE: 134 MMHG | HEIGHT: 72 IN | BODY MASS INDEX: 30.2 KG/M2 | OXYGEN SATURATION: 96 % | RESPIRATION RATE: 16 BRPM | WEIGHT: 223 LBS | DIASTOLIC BLOOD PRESSURE: 76 MMHG | HEART RATE: 48 BPM

## 2021-05-14 VITALS
SYSTOLIC BLOOD PRESSURE: 162 MMHG | DIASTOLIC BLOOD PRESSURE: 75 MMHG | HEART RATE: 62 BPM | BODY MASS INDEX: 29.32 KG/M2 | WEIGHT: 216.5 LBS | TEMPERATURE: 96.4 F | HEIGHT: 72 IN

## 2021-05-14 VITALS — WEIGHT: 218 LBS | BODY MASS INDEX: 29.53 KG/M2 | TEMPERATURE: 97.3 F | HEIGHT: 72 IN

## 2021-05-14 VITALS
WEIGHT: 220 LBS | BODY MASS INDEX: 29.8 KG/M2 | DIASTOLIC BLOOD PRESSURE: 65 MMHG | HEIGHT: 72 IN | HEART RATE: 60 BPM | SYSTOLIC BLOOD PRESSURE: 147 MMHG

## 2021-05-14 NOTE — PROGRESS NOTES
Progress Note      Patient Name: Jose Rios   Patient ID: [id number]   Sex: Male   YOB: 1950    Primary Care Provider: Jagdeep PEREZ   Referring Provider: Tayla Edward PA-C    Visit Date: January 6, 2021    Provider: Yamil Colin MD   Location: [facility]   Location Address: [address] [place] [address]  [id number]   Location Phone: [phone]          Chief Complaint     Patient here for 2 month follow up       History Of Present Illness  Jose iRos is a 70 year old /White male [facility] from Tayla Edward PA-C.      62-year-old man here for follow-up of his neuropathy work-up.  Neuropathy work-up has been unremarkable and I reviewed the labs that were ordered including hemoglobin A1c, uric acid, rheumatoid factor, immunofixation electrophoresis, serum electrophoresis, copper level, XIMENA, thyroid profile, ESR, CMP, hepatitis B and C, B12 levels, paraneoplastic panel, hepatitis screen.  He states that he is off balance and at times he has fallen down twice.  He is now using a cane.  I reviewed neuroimaging studies of his MRI of the brain shows significant white matter changes and has had uncontrolled hypertension for years.  He has had MRI of the cervical and lumbar spine showing degenerative disease.  I reviewed the reports as well I discussed this with him.       Past Medical History  Anemia; Anxiety disorder; Arthritis; Bipolar affect, depressed; Cervical radiculopathy; Cervical spinal stenosis; Cervicalgia; Chronic Obstructive Pulmonary Disease; Chronic pain of both ankles; CKD (chronic kidney disease) stage 3, GFR 30-59 ml/min; COPD (chronic obstructive pulmonary disease); Depression; Essential hypertension; Folic acid deficiency; Foraminal stenosis of cervical region; Gastric reflux; Head injury; Herniated disc, C5-6; Hyperlipidemia; Hypertension; Hyponatremia; Hyponatremia;  Limb Swelling; Lung disease; Major depressive disorder; Mediastinal adenopathy; Migraine; MVP (mitral valve prolapse); JIM (obstructive sleep apnea); Osteoarthritis; Pneumonia; Reflux; Shortness of Breath; Slurred speech; Tremor of right hand; Ulnar neuropathy         Past Surgical History  Appendectomy; Arm Surgery; Back; Cervical Fusion; Cervical intervertebral disc surgery         Medication List  amlodipine 5 mg oral tablet; Breo Ellipta 200-25 mcg/dose inhalation blister with device; bupropion HCl 450 mg oral tablet extended release 24 hr; clonazepam 1 mg oral tablet; Incruse Ellipta 62.5 mcg/actuation inhalation blister with device; LOSARTAN POTASSIUM 50 MG TA 50 TAB; METOPROLOL SUCC  MG T 100 TAB; METOPROLOL SUCC ER 50 MG TA 50 TAB; nystatin 100,000 unit/gram topical powder; PANTOPRAZOLE SOD DR 40 MG T 40 TAB; Percocet  mg oral tablet; permethrin 5 % topical cream; primidone 50 mg oral tablet; turmeric 400 mg oral capsule; Vitamin D3 5,000 unit oral tablet; Voltaren 1 % topical gel         Allergy List  PENICILLINS       Allergies Reconciled  Family Medical History  Stroke; Heart Disease; Cancer, Unspecified; Family history of certain chronic disabling diseases; arthritis; Family history of Arthritis         Social History  Alcohol (Never); lives with children; lives with parents; .; Recreational Drug Use (Never); Retired.; Tobacco (Former)         Immunizations  Name Date Admin   Influenza 10/01/2020   Influenza 10/01/2019   Influenza 10/01/2018   Influenza 10/01/2018   Influenza 10/01/2018   Influenza 10/16/2017   Tkwyqxhuo24 08/01/2014   Ltzzrkjqa74 08/01/2014   Wtozharnv57 08/01/2014   Prevnar 13 07/29/2019   Prevnar 13 07/29/2019   Prevnar 13 07/29/2019         Review of Systems  · Constitutional  o Denies  o : chills, excessive sweating, fatigue, fever, sycope/passing out, weight gain, weight loss  · Eyes  o Denies  o : changes in vision, blurred vision, double  vision  · HENT  o Denies  o : hearing loss, ringing in the ears, ear aches, sore throat, nasal congestion, sinus pain, nose bleeds, seasonal allergies  · Cardiovascular  o Admits  o : easy burising or bleeding  o Denies  o : blood clots, swollen legs, anemia, transfusions  · Respiratory  o Admits  o : shortness of breath, COPD  o Denies  o : dry cough, productive cough, pneumonia  · Gastrointestinal  o Denies  o : dysphagia, reflux  · Genitourinary  o Denies  o : incontinence  · Neurologic  o Admits  o : headache, tremor, loss of balance, difficulty with sleep  o Denies  o : seizure, stroke, falls, dizziness/vertigo, numbness/tingling/paresthesia , difficulty with coordination, difficulty with dexterity, weakness  · Musculoskeletal  o Admits  o : joint pain, sciatica, pain radiating in arm, pain radiating in leg, low back pain  o Denies  o : neck stiffness/pain, swollen lymph nodes, muscle aches, weakness, spasms  · Endocrine  o Denies  o : diabetes, thyroid disorder  · Psychiatric  o Admits  o : anxiety, depression      Vitals  Date Time BP Position Site L\R Cuff Size HR RR TEMP (F) WT  HT  BMI kg/m2 BSA m2 O2 Sat FR L/min FiO2 HC       01/06/2021 10:34 /75 Sitting    62 - R  96.4 216lbs 8oz 6'   29.36 2.23             Physical Examination     Is alert, fluent, phasic, follows commands well.  Is using a cane to ambulate.  There is no focal weakness.           Assessment  · Gait instability     781.2/R26.81  I discussed with him that his gait instability is a combination of white matter changes in his brain producing the gait disturbance and increased risk for falls as well as probable neuropathy. I discussed with him that I am not able to find any treatable etiology for the neuropathy at this point. Treatment is symptomatic. I will refer him to physical therapy for gait training and balance exercises. He is to follow-up with his primary care physician in the future. He is also seen neurosurgery in the past  and he was told that he was not a surgical candidate.    Total time spent with patient coordinating patient care was 30 minutes  · MRI of brain abnormal     793.0/R90.89      Plan  · Orders  o PHYSICAL THERAPY CONSULTATION (PHYT) - 793.0/R90.89, 781.2/R26.81 - 01/06/2021   Gait and balance exercises and training.  · Medications  o Medications have been Reconciled  o Transition of Care or Provider Policy  · Instructions  o Encouraged to follow-up with Primary Care Provider for preventative care.            Electronically Signed by: Yamil Colin MD -Author on January 12, 2021 05:43:52 PM

## 2021-05-14 NOTE — PROGRESS NOTES
Progress Note      Patient Name: Jose Rios   Patient ID: [id number]   Sex: Male   YOB: 1950    Primary Care Provider: Jagdeep PEREZ   Referring Provider: Tayla Edward PA-C    Visit Date: March 4, 2021    Provider: CHRIS Toussaint   Location: [facility]   Location Address: [address] [place] [address], [address] [place] [address]  [id number]   Location Phone: [phone]          Chief Complaint     The patient is here for a f/u of dizziness, htn, anxiety, GERD, vit d def.       History Of Present Illness  Jose Rios is a 70 year old /White male who presents for evaluation and treatment of:      Pt here for 6 month f/u.    Pt reports frequent falls, 2-3 times per day, starting 2 months ago. He denies having any falls in the last 2-3 weeks. Pt has history of gait instability and abnormalities on brain MRI, sees neurologist. Neuro recommended he use a cane or walker at all times but patient does not use either.     Chronic back pain: Pt states his back pain has been bothering him more sore than normal for the last 6 months.    Depression: PHQ-9 score of 8 at today's visit. Pt states he feels good on wellbutrin and feels like it really helps.     HTN: states BP always runs high. 152/94 at today's visit.    GERD: Doing well on pantoprazole. No concerns reported.       Past Medical History  Disease Name Date Onset Notes   Anemia 10/24/2016 --    Anxiety disorder 09/21/2017 He states that his anxiety has been worse. He has gone to [facility] in the past and tried SSRI's w/o much benefit. He states that Klonopin helped, but stopped going and then stopped the Klonopin. Restart the Klonopin.    Arthritis --  --    Bipolar affect, depressed --  --    Cervical radiculopathy 12/15/2015 --    Cervical spinal stenosis 12/15/2015 --    Cervicalgia 04/03/2018 --    Chronic Obstructive Pulmonary Disease --  --    Chronic pain of both ankles 02/02/2018 --    CKD (chronic  kidney disease) stage 3, GFR 30-59 ml/min 02/02/2018 --    COPD (chronic obstructive pulmonary disease) --  --    Depression --  --    Essential hypertension 07/29/2019 --    Folic acid deficiency 11/02/2017 --    Foraminal stenosis of cervical region 05/10/2018 --    Gastric reflux --  --    Head injury --  --    Herniated disc, C5-6 10/21/2014 right   Hyperlipidemia --  --    Hypertension --  --    Hyponatremia 10/03/2016 --    Hyponatremia 11/09/2015 --    Limb Swelling --  --    Lung disease --  --    Major depressive disorder 07/29/2019 --    Mediastinal adenopathy 10/03/2016 --    Migraine --  --    MVP (mitral valve prolapse) 10/07/2015 --    JIM (obstructive sleep apnea) 10/03/2016 --    Osteoarthritis 07/29/2019 --    Pneumonia --  --    Reflux --  --    Shortness of Breath --  --    Slurred speech 12/01/2016 --    Tremor of right hand 10/07/2015 --    Ulnar neuropathy 10/18/2018 clinical         Past Surgical History  Procedure Name Date Notes   Appendectomy --  --    Arm Surgery --  Nerve damage repair left arm   Back --  --    Cervical Fusion 11/5/2014, 3/11/2015 C5-6, C6-7   Cervical intervertebral disc surgery 1996 C5-6 fusion         Medication List  Name Date Started Instructions   albuterol sulfate 90 mcg/actuation inhalation HFA aerosol inhaler  inhale 1 - 2 puffs (90 - 180 mcg) by inhalation route every 6 hours as needed   AMLODIPINE BESYLATE 5 MG TA 5 Tablet 02/15/2021 TAKE TWO TABLETS BY MOUTH EVERY DAY   BUPROPION HCL ER (XL) 450 M 450 Tablet 01/27/2021 TAKE 1 TABLET (450 MG) BY ORAL ROUTE ONCE DAILY SWALLOWING WHOLE. DO NOT CRUSH, CHEW AND/OR DIVIDE. FOR 30 DAYS   clonazepam 1 mg oral tablet 02/10/2021 take 1 tablet (1 mg) by oral route 2 times per day for 30 days   LOSARTAN POTASSIUM 50 MG TA 50 TAB 12/16/2020 TAKE ONE TABLET BY MOUTH TWO TIMES A DAY   METOPROLOL SUCC  MG T 100 TAB 11/30/2020 TAKE ONE TABLET BY MOUTH EVERY DAY   METOPROLOL SUCC ER 50 MG TA 50 TAB 10/28/2020 TAKE ONE  TABLET BY MOUTH EVERY DAY ALONG WITH 100MG TABLET   PANTOPRAZOLE SOD DR 40 MG T 40 Tablet 02/15/2021 TAKE 1 TABLET (40 MG) BY ORAL ROUTE ONCE DAILY   Percocet  mg oral tablet 12/13/2019 take 1 tablet by oral route every 4 to 6 hours as needed   primidone 50 mg oral tablet 08/26/2020 take 1 tablet by oral route 3 times a day for 90 days   Spiriva with HandiHaler 18 mcg inhalation capsule, w/inhalation device  inhale the contents of one capsule (18 mcg) using 2 inhalations by inhalation route once daily via handihaler   turmeric 400 mg oral capsule  take 3 capsules by oral route daily for 30 days   Vitamin D3 5,000 unit oral tablet  take 1 tablet by oral route daily   Voltaren 1 % topical gel 08/21/2020 apply 2 gram to the affected area(s) by topical route 4 times per day         Allergy List  Allergen Name Date Reaction Notes   PENICILLINS --  --  --          Family Medical History  Disease Name Relative/Age Notes   Stroke Mother/   Mother   Heart Disease Father/  Mother/   Mother; Father  Father; Mother  grandparents/not specified   Cancer, Unspecified Brother/   Brother   Family history of certain chronic disabling diseases; arthritis Mother/   Mother   Family history of Arthritis Mother/   Mother         Social History  Finding Status Start/Stop Quantity Notes   Alcohol Never --/-- --  11/02/2020 -    lives with children --  --/-- --  --    lives with parents --  --/-- --  --    . --  --/-- --  --    Recreational Drug Use Never --/-- --  no   Retired. --  --/-- --  --    Tobacco Former --/-- --  11/02/2020 - former smoker  current every day smoker, 1 packs per day, smoked 31 or more years  42+ years Quit smoking August 2016         Immunizations  NameDate Admin Mfg Trade Name Lot Number Route Inj VIS Given VIS Publication   Ggubapoyl03/01/2020 SKB Fluarix, quadrivalent, preservative free [id number] NE NE 12/04/2020    Comments:    Gxumdawpb3371/01/2014 NE Not Entered  NE NE 07/29/2019    Comments:     Prevnar 1307/29/2019 WAL PREVNAR 13 [id number] IM LD 07/29/2019    Comments: pt tolerated injection well         Review of Systems  · Constitutional  o Denies  o : fever, fatigue, weight loss, weight gain  · Cardiovascular  o Denies  o : lower extremity edema, claudication, chest pressure, palpitations  · Respiratory  o Denies  o : shortness of breath, wheezing, cough, hemoptysis, dyspnea on exertion  · Gastrointestinal  o Denies  o : nausea, vomiting, diarrhea, constipation, abdominal pain      Vitals  Date Time BP Position Site L\R Cuff Size HR RR TEMP (F) WT  HT  BMI kg/m2 BSA m2 O2 Sat FR L/min FiO2 HC       03/04/2021 10:16 /94 Sitting    58 - R 16 96.9 221lbs 0oz 6'   29.97 2.26 96 %  21%          Physical Examination  · Constitutional  o Appearance  o : well-nourished, well developed, alert, in no acute distress  · Eyes  o Conjunctivae  o : conjunctivae normal  o Sclerae  o : sclerae white  o Pupils and Irises  o : pupils equal, round, and reactive to light and accommodation bilaterally  o Corneas  o : tear film normal, no lesions present  o Eyelids/Ocular Adnexae  o : eyelid appearance normal, no exudates present, eye moisture level normal  · Neck  o Inspection/Palpation  o : normal appearance, no masses or tenderness, trachea midline, no enlarged cervical or supraclavicular lymphnodes palpated  o Thyroid  o : gland size normal, nontender, no nodules or masses present on palpation  · Respiratory  o Respiratory Effort  o : breathing unlabored  o Inspection of Chest  o : normal appearance, no retractions  o Auscultation of Lungs  o : normal breath sounds throughout  · Cardiovascular  o Heart  o :   § Auscultation of Heart  § : regular rate and rhythm without murmur, PMI normal  o Peripheral Vascular System  o :   § Carotid Arteries  § : normal pulses bilaterally, no bruits present  § Pedal Pulses  § : pulses 2 bilaterally  § Extremities  § : no cyanosis, clubbing or edema; less than 2 second refill  noted  · Musculoskeletal  o General  o : No joint swelling or deformity noted. Muscle tone, strength and development grossly normal.  · Skin and Subcutaneous Tissue  o General Inspection  o : no rashes or lesions present, no areas of discoloration  · Neurologic  o Mental Status Examination  o : judgement, insight intact, modd and affect appropriate  o Motor Examination  o : strength grossly intact in all four extremities  o Gait and Station  o : normal gait, able to stand without difficulty          Assessment  · Screening for depression     V79.0/Z13.89  · Depression     311/F32.9  · Essential hypertension     401.9/I10  · Impaired fasting glucose     790.21/R73.01  · Vitamin D deficiency     268.9/E55.9  · Aortic aneurysm     441.9/I71.9  · Back pain     724.5/M54.9  · Gait instability     781.2/R26.81       Courage to use his cane or walker when he is walking.       Plan  · Orders  o Annual depression screening, 15 minutes (, 81269) - V79.0/Z13.89 - 03/04/2021  o ACO-18: Positive screen for clinical depression using a standardized tool and a follow-up plan documented () - V79.0/Z13.89 - 03/04/2021   feels good on current regimen  o HTN/Lipid Panel (CMP, Lipid) [facility] (49025, 46875) - 401.9/I10 - 03/04/2021  o CBC with Auto Diff [facility] (55002) - 401.9/I10 - 03/04/2021  o Urinalysis with Reflex Microscopy ([facility]) (01278) - 401.9/I10 - 03/04/2021  o Hgb A1c [facility] (73536) - 790.21/R73.01 - 03/04/2021  o ACO - Pt declines to or was not able to provide an Advance Care Plan or name a Surrogate Decision Maker (1124F) - - 03/04/2021  o ACO-15: Pneumococcal Vaccine Administered or Previously Received [facility] (4040F) - - 03/04/2021  o ACO-14: Influenza immunization administered or previously received [facility] () - - 03/04/2021  o ACO-13: Fall Risk Screening with 2 or more falls in past year or any fall with injury in the past year (1100F) - - 03/04/2021  o ACO-39: Current medications updated and reviewed (, 5499F) - -  03/04/2021  o Ultrasound aorta (81150) - 441.9/I71.9 - 03/04/2021  · Medications  o metoprolol succinate 100 mg oral tablet extended release 24 hr   SIG: take 1 tablet (100 mg) by oral route once daily for 90 days   DISP: (90) Tablet with 1 refills  Prescribed on 03/04/2021     o metoprolol succinate 50 mg oral tablet extended release 24 hr   SIG: take 1 tablet (50 mg) by oral route once daily for 90 days   DISP: (90) Tablet with 1 refills  Prescribed on 03/04/2021     o amlodipine 5 mg oral tablet   SIG: take 1 tablet (5 mg) by oral route once daily for 90 days   DISP: (90) Tablet with 1 refills  Prescribed on 03/04/2021     o primidone 50 mg oral tablet   SIG: take 1 tablet by oral route 3 times a day for 90 days   DISP: (270) Tablet with 2 refills  Refilled on 03/04/2021     o AMLODIPINE BESYLATE 5 MG TA 5 Tablet   SIG: TAKE TWO TABLETS BY MOUTH EVERY DAY   DISP: (120) Tablet with 0 refills  Discontinued on 03/04/2021     o METOPROLOL SUCC  MG T 100 TAB   SIG: TAKE ONE TABLET BY MOUTH EVERY DAY   DISP: (90) Tablet with 0 refills  Discontinued on 03/04/2021     o METOPROLOL SUCC ER 50 MG TA 50 TAB   SIG: TAKE ONE TABLET BY MOUTH EVERY DAY ALONG WITH 100MG TABLET   DISP: (90) Tablet with 0 refills  Discontinued on 03/04/2021     o Medications have been Reconciled  o Transition of Care or Provider Policy  · Instructions  o Depression Screen completed and scanned into the EMR under the designated folder within the patient's documents.  o Today's PHQ-9 result is _8__  o The provider screening met the required time of 15 minutes.  o Patient was educated/instructed on their diagnosis, treatment and medications prior to discharge from the clinic today.  o Minutes spent with patient including greater than 50% in Education/Counseling/Care Coordination.  o Time spent with the patient was minutes, more than 50% face to face.  · Disposition  o Return in 6 months            Electronically Signed by: CHRIS Toussaint  -Author on March 4, 2021 11:29:43 AM

## 2021-05-15 VITALS — HEART RATE: 59 BPM | WEIGHT: 205 LBS | BODY MASS INDEX: 27.77 KG/M2 | HEIGHT: 72 IN | OXYGEN SATURATION: 97 %

## 2021-05-15 VITALS
WEIGHT: 209 LBS | OXYGEN SATURATION: 92 % | HEIGHT: 72 IN | DIASTOLIC BLOOD PRESSURE: 72 MMHG | SYSTOLIC BLOOD PRESSURE: 152 MMHG | HEART RATE: 60 BPM | RESPIRATION RATE: 16 BRPM | TEMPERATURE: 96.9 F | BODY MASS INDEX: 28.31 KG/M2

## 2021-05-15 VITALS — WEIGHT: 205 LBS | HEIGHT: 72 IN | HEART RATE: 62 BPM | BODY MASS INDEX: 27.77 KG/M2 | OXYGEN SATURATION: 97 %

## 2021-05-15 VITALS
BODY MASS INDEX: 28.04 KG/M2 | OXYGEN SATURATION: 98 % | RESPIRATION RATE: 16 BRPM | WEIGHT: 207 LBS | TEMPERATURE: 97.6 F | HEIGHT: 72 IN | SYSTOLIC BLOOD PRESSURE: 145 MMHG | HEART RATE: 56 BPM | DIASTOLIC BLOOD PRESSURE: 78 MMHG

## 2021-05-15 VITALS
BODY MASS INDEX: 27.9 KG/M2 | SYSTOLIC BLOOD PRESSURE: 144 MMHG | WEIGHT: 206 LBS | TEMPERATURE: 96.9 F | HEIGHT: 72 IN | DIASTOLIC BLOOD PRESSURE: 82 MMHG | OXYGEN SATURATION: 96 % | HEART RATE: 66 BPM | RESPIRATION RATE: 16 BRPM

## 2021-05-15 VITALS
HEIGHT: 72 IN | BODY MASS INDEX: 28.99 KG/M2 | SYSTOLIC BLOOD PRESSURE: 155 MMHG | HEART RATE: 62 BPM | WEIGHT: 214 LBS | RESPIRATION RATE: 16 BRPM | DIASTOLIC BLOOD PRESSURE: 74 MMHG | OXYGEN SATURATION: 100 % | TEMPERATURE: 97.1 F

## 2021-05-15 VITALS
HEART RATE: 55 BPM | RESPIRATION RATE: 16 BRPM | DIASTOLIC BLOOD PRESSURE: 64 MMHG | SYSTOLIC BLOOD PRESSURE: 110 MMHG | TEMPERATURE: 97.9 F | WEIGHT: 224 LBS | OXYGEN SATURATION: 95 % | BODY MASS INDEX: 30.34 KG/M2 | HEIGHT: 72 IN

## 2021-05-15 VITALS
HEART RATE: 74 BPM | HEIGHT: 72 IN | TEMPERATURE: 97.6 F | WEIGHT: 219 LBS | DIASTOLIC BLOOD PRESSURE: 90 MMHG | BODY MASS INDEX: 29.66 KG/M2 | SYSTOLIC BLOOD PRESSURE: 150 MMHG | RESPIRATION RATE: 18 BRPM

## 2021-05-15 VITALS — WEIGHT: 205 LBS | HEART RATE: 57 BPM | OXYGEN SATURATION: 97 % | HEIGHT: 72 IN | BODY MASS INDEX: 27.77 KG/M2

## 2021-05-15 VITALS
HEIGHT: 72 IN | HEART RATE: 56 BPM | OXYGEN SATURATION: 100 % | RESPIRATION RATE: 18 BRPM | DIASTOLIC BLOOD PRESSURE: 73 MMHG | TEMPERATURE: 96.8 F | BODY MASS INDEX: 28.85 KG/M2 | SYSTOLIC BLOOD PRESSURE: 174 MMHG | WEIGHT: 213 LBS

## 2021-05-15 VITALS — WEIGHT: 215 LBS | HEART RATE: 58 BPM | OXYGEN SATURATION: 98 % | HEIGHT: 72 IN | BODY MASS INDEX: 29.12 KG/M2

## 2021-05-15 VITALS — HEIGHT: 72 IN | BODY MASS INDEX: 27.77 KG/M2 | WEIGHT: 205 LBS

## 2021-05-16 VITALS
BODY MASS INDEX: 29.93 KG/M2 | HEART RATE: 54 BPM | OXYGEN SATURATION: 100 % | RESPIRATION RATE: 18 BRPM | DIASTOLIC BLOOD PRESSURE: 88 MMHG | WEIGHT: 221 LBS | SYSTOLIC BLOOD PRESSURE: 135 MMHG | HEIGHT: 72 IN | TEMPERATURE: 97.1 F

## 2021-05-16 VITALS
BODY MASS INDEX: 31.15 KG/M2 | HEIGHT: 72 IN | TEMPERATURE: 98.2 F | DIASTOLIC BLOOD PRESSURE: 64 MMHG | RESPIRATION RATE: 22 BRPM | OXYGEN SATURATION: 97 % | HEART RATE: 60 BPM | SYSTOLIC BLOOD PRESSURE: 124 MMHG | WEIGHT: 230 LBS

## 2021-05-16 VITALS
RESPIRATION RATE: 18 BRPM | OXYGEN SATURATION: 99 % | HEART RATE: 49 BPM | SYSTOLIC BLOOD PRESSURE: 115 MMHG | HEIGHT: 72 IN | WEIGHT: 221 LBS | TEMPERATURE: 97 F | DIASTOLIC BLOOD PRESSURE: 67 MMHG | BODY MASS INDEX: 29.93 KG/M2

## 2021-05-16 VITALS
WEIGHT: 224 LBS | BODY MASS INDEX: 30.34 KG/M2 | SYSTOLIC BLOOD PRESSURE: 179 MMHG | DIASTOLIC BLOOD PRESSURE: 85 MMHG | OXYGEN SATURATION: 98 % | HEART RATE: 56 BPM | HEIGHT: 72 IN

## 2021-05-16 VITALS
HEART RATE: 51 BPM | HEIGHT: 72 IN | SYSTOLIC BLOOD PRESSURE: 120 MMHG | WEIGHT: 215 LBS | OXYGEN SATURATION: 95 % | TEMPERATURE: 97.8 F | DIASTOLIC BLOOD PRESSURE: 76 MMHG | BODY MASS INDEX: 29.12 KG/M2

## 2021-05-16 VITALS
OXYGEN SATURATION: 98 % | SYSTOLIC BLOOD PRESSURE: 119 MMHG | DIASTOLIC BLOOD PRESSURE: 57 MMHG | BODY MASS INDEX: 30.88 KG/M2 | WEIGHT: 228 LBS | HEIGHT: 72 IN | HEART RATE: 56 BPM

## 2021-05-16 VITALS
BODY MASS INDEX: 29.12 KG/M2 | WEIGHT: 215 LBS | HEIGHT: 72 IN | SYSTOLIC BLOOD PRESSURE: 150 MMHG | DIASTOLIC BLOOD PRESSURE: 75 MMHG | HEART RATE: 59 BPM

## 2021-05-16 VITALS
DIASTOLIC BLOOD PRESSURE: 73 MMHG | HEIGHT: 72 IN | BODY MASS INDEX: 29.8 KG/M2 | SYSTOLIC BLOOD PRESSURE: 132 MMHG | WEIGHT: 220 LBS | HEART RATE: 59 BPM

## 2021-05-16 VITALS
OXYGEN SATURATION: 97 % | HEIGHT: 72 IN | HEART RATE: 72 BPM | SYSTOLIC BLOOD PRESSURE: 132 MMHG | DIASTOLIC BLOOD PRESSURE: 72 MMHG | BODY MASS INDEX: 30 KG/M2 | WEIGHT: 221.5 LBS | TEMPERATURE: 96.1 F

## 2021-05-16 VITALS — HEART RATE: 54 BPM | SYSTOLIC BLOOD PRESSURE: 124 MMHG | DIASTOLIC BLOOD PRESSURE: 80 MMHG

## 2021-05-16 VITALS
HEART RATE: 68 BPM | HEIGHT: 72 IN | SYSTOLIC BLOOD PRESSURE: 140 MMHG | BODY MASS INDEX: 28.99 KG/M2 | DIASTOLIC BLOOD PRESSURE: 72 MMHG | WEIGHT: 214 LBS

## 2021-05-28 ENCOUNTER — TRANSCRIBE ORDERS (OUTPATIENT)
Dept: ADMINISTRATIVE | Facility: HOSPITAL | Age: 71
End: 2021-05-28

## 2021-05-28 VITALS
SYSTOLIC BLOOD PRESSURE: 148 MMHG | OXYGEN SATURATION: 95 % | RESPIRATION RATE: 12 BRPM | SYSTOLIC BLOOD PRESSURE: 174 MMHG | WEIGHT: 220.06 LBS | RESPIRATION RATE: 14 BRPM | HEART RATE: 55 BPM | WEIGHT: 222.25 LBS | HEIGHT: 72 IN | DIASTOLIC BLOOD PRESSURE: 76 MMHG | HEIGHT: 72 IN | HEART RATE: 56 BPM | TEMPERATURE: 98.5 F | DIASTOLIC BLOOD PRESSURE: 71 MMHG | HEART RATE: 58 BPM | TEMPERATURE: 98.5 F | HEART RATE: 81 BPM | WEIGHT: 212.25 LBS | TEMPERATURE: 98.5 F | WEIGHT: 211.56 LBS | BODY MASS INDEX: 30.1 KG/M2 | WEIGHT: 205 LBS | RESPIRATION RATE: 12 BRPM | RESPIRATION RATE: 12 BRPM | SYSTOLIC BLOOD PRESSURE: 124 MMHG | SYSTOLIC BLOOD PRESSURE: 152 MMHG | DIASTOLIC BLOOD PRESSURE: 81 MMHG | OXYGEN SATURATION: 99 % | HEART RATE: 60 BPM | BODY MASS INDEX: 28.75 KG/M2 | SYSTOLIC BLOOD PRESSURE: 149 MMHG | DIASTOLIC BLOOD PRESSURE: 60 MMHG | BODY MASS INDEX: 29.81 KG/M2 | BODY MASS INDEX: 28.65 KG/M2 | TEMPERATURE: 98.4 F | OXYGEN SATURATION: 96 % | BODY MASS INDEX: 27.77 KG/M2 | HEIGHT: 72 IN | TEMPERATURE: 98.3 F | HEIGHT: 72 IN | OXYGEN SATURATION: 97 % | DIASTOLIC BLOOD PRESSURE: 84 MMHG | OXYGEN SATURATION: 98 % | RESPIRATION RATE: 14 BRPM | HEIGHT: 72 IN

## 2021-05-28 VITALS
TEMPERATURE: 98.6 F | HEIGHT: 72 IN | BODY MASS INDEX: 30.07 KG/M2 | RESPIRATION RATE: 14 BRPM | WEIGHT: 222 LBS | DIASTOLIC BLOOD PRESSURE: 83 MMHG | SYSTOLIC BLOOD PRESSURE: 159 MMHG | HEART RATE: 61 BPM | OXYGEN SATURATION: 93 %

## 2021-05-28 VITALS
SYSTOLIC BLOOD PRESSURE: 163 MMHG | BODY MASS INDEX: 28.89 KG/M2 | HEIGHT: 72 IN | OXYGEN SATURATION: 99 % | TEMPERATURE: 98.1 F | RESPIRATION RATE: 14 BRPM | HEART RATE: 69 BPM | DIASTOLIC BLOOD PRESSURE: 78 MMHG | WEIGHT: 213.31 LBS

## 2021-05-28 DIAGNOSIS — J44.9 OBSTRUCTIVE CHRONIC BRONCHITIS WITHOUT EXACERBATION (HCC): Primary | ICD-10-CM

## 2021-05-28 NOTE — PROGRESS NOTES
Patient: JOSE RIOS     Acct: [id number]     Report: #[id number]  UNIT #: [id number]     : 1950    Encounter Date:2020  PRIMARY CARE: BLANKA MARTINEZ [facility]  ***Signed***  --------------------------------------------------------------------------------------------------------------------  TELEHEALTH NOTE      History of Present Illness      Chief Complaint: 5 month F/U            Jose Rios is presenting for evaluation via Telehealth visit by phone.     Verbal consent obtained before beginning visit.            Provider spent 11 minutes with the patient during telehealth visit including     discussing the case with the patient over the phone and personally reviewing all    pertinent labs, imaging and provider notes.            The following staff were present during the visit: Eboni Bradley CMA, Percy alvarado MD            The patient is a 69 year old  male with chronic obstructive pulmonary     disease  who presents for Telehealth visit today. Since his last office visit he    notes no changes and has been doing relatively well, he is on the same     therapies. He did complete pulmonary rehab but is not interested in going back.     He gets short of breath walking about 1500 feet or going up 1-2 flights of     steps. It is moderate in severity, worse with exertion, improved with rest. He     denies any coughing, wheezing, headaches and hemoptysis or chest pain. He denies    any nausea or vomiting, fever or chills. He has not had a cigarette in over 3     years. He is able to perform his ADL's without difficulty and denies any swollen    glands in his lymph nodes, head or neck. He wears his CPAP nightly without     issues. He denies any excessive daytime sleepiness or morning headaches.             I personally reviewed Review of Systems, family, social, surgical and medical     history and agree with their findings.             Physical exam is deferred due to Telehealth visit.  .                          Past Med History      HX COPD      Former Smoker x50 yrs Quit 2016      Vaccines Current      Overview of Symptoms      Complains of SOA      Denies Fever, chills, body aches            Allergies/Medications      Allergies:        Coded Allergies:             PENICILLINS (Verified  Allergy, Severe, HIVES/SWELLING, 1/21/20)                  PT REPORTS HE HAS TAKEN KEFLEX W/O REACTION IN THE PAST      Medications    Last Reconciled on 6/5/20 15:38 by GARETT CRUZ MD      Tiotropium Bromide (Spiriva Respimat 2.5 mcg/Puff) 4 Gm Mist.inhal      2 PUFFS INH RTQDAY, #1 MDI 0 Refills         Prov: GARETT CRUZ         6/5/20       Fluticasone/Vilanterol 200-25 Mcg Inh (Breo Ellipta 200-25 Mcg Inh) 1 Each     Blst.w.dev      1 PUFF INH QDAY, #3 INH 3 Refills         Prov: GARETT CRUZ         6/5/20       MDI-Albuterol (Ventolin HFA) 18 Gm Hfa.aer.ad      2 PUFFS INH Q6H PRN for SHORTNESS OF BREATH, #1 MDI 3 Refills         Prov: GARETT CRUZ         12/18/19       oxyCODONE-Acetaminophen  Mg (Endocet  Mg) 1 Each Tablet      1 TAB PO Q3-4H PRN for JOINT PAIN, #45 TAB         Prov: Sweta Nichols         11/23/19       Famotidine (Famotidine) 40 Mg Tablet      40 MG PO HS for 30 Days, #30 TAB         Reported         11/15/19       Metoprolol Succinate (Metoprolol Succinate) 50 Mg Tab.er.24h      150 MG PO QDAY, #60 TAB.SR.24H 0 Refills         Reported         11/15/19       Primidone (Mysoline*) 50 Mg Tablet      50 MG PO TID, TAB         Reported         10/29/19       buPROPion HCl XL (Wellbutrin XL) 300 Mg Tab.er.24h      300 MG PO QDAY for 30 Days, #30 TAB.ER.24H         Reported         10/29/19       Cholecalciferol (Vitamin D3) (Vitamin D3) 1,000 Unit Tab      2000 UNITS PO QDAY, #60 TAB 0 Refills         Reported         10/29/19       Cyanocobalamin (Vitamin B-12*) 500 Mcg Tab      500 MCG PO QDAY, #30 TAB         Reported         10/29/19       (Tumeric Curcumin)   No  Conflict Check      3 TAB PO QAM         Reported         10/29/19       Albuterol/Ipratropium (Duoneb) 3 Ml Ampul.neb      3 ML INH Q4H PRN for SHORTNESS OF BREATH for 30 Days, #180 NEB 5 Refills         Prov: Lauren Carrillo PA-C         6/12/19       amLODIPine (amLODIPine) 5 Mg Tablet      5 MG PO QDAY, #30 TAB         Reported         6/11/19       Losartan Potassium (Losartan*) 50 Mg Tablet      50 MG PO BID, #60 TAB 0 Refills         Reported         12/19/18       clonazePAM (clonazePAM) 0.5 Mg Tablet      0.5 MG PO BID, #60 TAB 0 Refills         Reported         10/25/18       oxyCODONE-Acetaminophen  Mg (oxyCODONE-Acetaminophen  Mg) 1 Each     Tablet      1 TAB PO Q8HR PRN for PAIN, TAB 0 Refills         Reported         10/25/18            Plan/Instructions      Ambulatory Assessment/Plan:        Notes      New Medications      * TIOTROPIUM BROMIDE (Spiriva Respimat 2.5 mcg/Puff) 4 GM MIST.INHAL: 2 PUFFS       INH RTQDAY #1      Renewed Medications      * Fluticasone/Vilanterol 200-25 Mcg Inh (Breo Ellipta 200-25 Mcg Inh) 1 EACH       BLST.W.DEV: 1 PUFF INH QDAY #3      Discontinued Medications      * Apixaban (Eliquis) 2.5 MG TABLET: 2.5 MG PO BID 5 Days #10      * Permethrin (Permethrin 5% Cream) 60 GM CREAM..G.: 1 APL TOPICAL ONCE #1         Instructions: Repeat in 7 days      Plan/Instructions      * Plan Of Care: ()            * Chronic conditions reviewed and taken into consideration for today's treatment       plan.      * Patient instructed to seek medical attention urgently for new or worsening       symptoms.      * Patient was educated/instructed on their diagnosis, treatment and medications       prior to discharge from the clinic today.            IMPRESSION:      1. Chronic dyspnea at baseline.       2. Emphysema.       3. Obstructive sleep apnea well controlled with CPAP.      4. Tobacco abuse of cigarettes in remission.       5. Stable aortic aneurysm.             PLAN:       1. The patient has gained weight since the last time I saw him and CT scan of     the  abdomen and pelvis was unremarkable.       2. Continue triple inhaler therapy with albuterol as needed.       3. The patient declined pulmonary rehab.       4. Continue annual low dose lung cancer screening CT scan.       5.  He sees a cardiac thoracic surgeon in [place] that follows his aortic     aneurysm.       6.  Up to date with  flu, Prevnar and Pneumovax.         7. Follow up with us in 6 months.      Codes:  Phone Eval 11-20 mi [id number]            Electronically signed by GARETT CRUZ  06/17/2020 16:25       Disclaimer: Converted document may not contain table formatting or lab diagrams. Please see [employer] for the authenticated document.

## 2021-05-28 NOTE — PROGRESS NOTES
Patient: FRED MCCORMACK     Acct: [id number]     Report: #[id number]  UNIT #: [id number]     : 1950    Encounter Date:2019  PRIMARY CARE: BLANKA MARTINEZ  ***Signed***  --------------------------------------------------------------------------------------------------------------------  Chief Complaint      Encounter Date      2019            Primary Care Provider      BLANKA MARTINEZ            Referring Provider      BLANKA MARTINEZ            Patient Complaint      Patient is complaining of      Patient here today for acute visit for increased SOA            VITALS      Height 6 ft 0 in / 182.88 cm      Weight 213 lbs 5 oz / 96.852011 kg      BSA 2.19 m2      BMI 28.9 kg/m2      Temperature 98.1 F / 36.72 C - Oral      Pulse 69      Respirations 14      Blood Pressure 163/78 Sitting, Right Arm      Pulse Oximetry 99%, room air            HPI      The patient is a very pleasant 68 year old white male followed by Dr. Kruger,     last seen by him in 2018. He has a history of emphysema, previously not    had evidence of obstruction on pulmonary function test. He also has a history of    obstructive sleep apnea on nightly CPAP. He is a former heavy smoker but quit     smoking in 2016. He had an over 50 pack year history. He was previously     maintained on breo 200 along with incruse and did quite well on those however     the patient states for the past 6 weeks he has had worsening dyspnea on exertion    with mild to moderate exertion relieved with rest. He denies any increased     coughing or wheezing. He denies hemoptysis, fever or chills or  purulent sputum     production. He rarely coughs and if he does his cough is dry.  Had a     hospitalization in 2019 for significant hyponatremia. He has been followed    by nephrology Dr. Fernando since then and it appears on his most recent blood work     his hyponatremia has resolved. The patient is complaining of runny nose  in the     morning but denies postnasal drip. He denies scratchy throat or itchy eyes. He     does not take anything for seasonal allergies and has typically only had mild     seasonal allergies. He still feels like breo helps quite a bit but has not     noticed much improvement with incruse anymore. He reports good compliance with     both of these and says he does not have a rescue inhaler or a nebulizer machine.    The patient is complaining that the full face mask of his CPAP is bothering his     mouth and irritates his gums. He is asking about trying a nasal mask instead.             I reviewed her Review of Systems, medical, surgical and family history and agree    with those as entered.      Copies To:   GARETT CRUZ      Constitutional:  Complains of: Fatigue; Denies: Fever, Weight gain, Weight loss,    Chills, Insomnia, Other      Respiratory/Breathing:  Complains of: Shortness of air, Wheezing; Denies: Cough,    Hemoptysis, Pleuritic pain, Other      Endocrine:  Denies: Polydipsia, Polyuria, Heat/cold intolerance, Diabetes, Other      Eyes:  Denies: Blurred vision, Vision Changes, Other      Ears, nose, mouth, throat:  Denies: Congestion, Dysphagia, Hearing Changes, Nose    Bleeding, Nasal Discharge, Throat pain, Tinnitus, Other      Cardiovascular:  Denies: Chest Pain, Exertional dyspnea, Peripheral Edema,     Palpitations, Syncope, Wake up Gasping for air, Orthopnea, Tachycardia, Other      Gastrointestinal:  Denies: Abdominal pain/cramping, Bloody stools, Constipation,    Diarrhea, Melena, Nausea, Vomiting, Other      Genitourinary:  Denies: Dysuria, Urinary frequency, Incontinence, Hematuria,     Urgency, Other      Musculoskeletal:  Denies: Joint Pain, Joint Stiffness, Joint Swelling, Myalgias,    Other      Hematologic/lymphatic:  DENIES: Lymphadenopathy, Bruising, Bleeding tendencies,     Other      Neurologic:  Denies: Headache, Numbness, Weakness, Seizures, Other       Psychiatric:  Denies: Anxiety, Appropriate Effect, Depression, Other      Sleep:  No: Excessive daytime sleep, Morning Headache?, Snoring, Insomnia?, Stop    breathing at sleep?, Other      Integumentary:  Denies: Rash, Dry skin, Skin Warm to Touch, Other            FAMILY/SOCIAL/MEDICAL HX      Surgical History:  Yes: Appendectomy (45-50 YRS AGO), Orthopedic Surgery (LEFT     MIDDLE TRIGGER FINGER SX), Spinal Surgery (SURGERY ON C5-C6 IN 1996 AND 1999);     No: Abdominal Surgery, Bladder Surgery, Bowel Surgery, CABG, Cholecystectomy,     Head Surgery, Oral Surgery, Vascular Surgery      Heart - Family Hx:  Mother, Father, Grandparent      Is Father Still Living?:  No      Is Mother Still Living?:  No       Family History:  Yes      Social History:  No Tobacco Use, No Alcohol Use, No Recreational Drug use      Smoking status:  Former smoker (1 ppd x 50 y quit 8/2016)      Smoking history:  25-50 pack years      Anticoagulation Therapy:  No      Antibiotic Prophylaxis:  No      Medical History:  Yes: Arthritis (GENERALIZED), Chronic Bronchitis/COPD,     Depression, Anxiety, Bipolar Disorder, Hemorrhoids/Rectal Prob (ACID REFLUX),     High Blood Pressure ( CONTROLLED WITH MEDS), Reflux Disease, Shortness Of Breath    (PNEUMONIA OCT 2014); No: Asthma, Blood Disease, Chemotherapy/Cancer, Congestive    Heart Failu, Deafness or Ringing Ears, Diabetes, Seizures, Heart Attack, Sinus     Trouble, Miscellaneous Medical/oth      Psychiatric History      depression and anxiety            PREVENTION      Hx Influenza Vaccination:  Yes      Date Influenza Vaccine Given:  Sep 1, 2018      Influenza Vaccine Declined:  No      2 or More Falls Past Year?:  No      Fall Past Year with Injury?:  No      Hx Pneumococcal Vaccination:  Yes      Encouraged to follow-up with:  PCP regarding preventative exams.      Chart initiated by      Eboni Bradley CMA            ALLERGIES/MEDICATIONS      Allergies:        Coded Allergies:              PENICILLINS (Verified  Allergy, Unknown, SWELLING, 6/11/19)      Uncoded Allergies:             Penicillin (Allergy, Mild, 10/13/07)      Medications    Last Reconciled on 6/11/19 08:35 by AMERICA STARK-Albuterol (Ventolin HFA) 8 Gm Hfa.aer.ad      1 PUFFS INH Q4-6H PRN for SHORTNESS OF BREATH, #1 MDI 2 Refills         Prov: Lauren Carrillo PA-C         6/11/19       Cetirizine Hcl (ZyrTEC) 10 Mg Tablet      10 MG PO HS, #30 TAB 5 Refills         Prov: Lauren Carrillo PA-C         6/11/19       Albuterol/Ipratropium (Duoneb) 3 Ml Ampul.neb      3 ML INH Q4H PRN for SHORTNESS OF BREATH, #120 NEB 5 Refills         Prov: Laurne Carrillo PA-C         6/11/19       Tiotropium Bromide (Spiriva Respimat 2.5 mcg/Puff) 4 Gm Mist.inhal      2 PUFFS INH RTQDAY, #1 MDI 5 Refills         Prov: Lauren Carrillo PA-C         6/11/19       buPROPion XL (buPROPion XL) 150 Mg Tab.er.24h      150 MG PO QDAY, #30 TAB.SR.24H         Reported         6/11/19       Ranitidine Hcl (Ranitidine*) 150 Mg Tablet      150 MG PO HS, #30 TAB 0 Refills         Reported         6/11/19       amLODIPine (amLODIPine) 5 Mg Tablet      5 MG PO QDAY, #30 TAB         Reported         6/11/19       Esomeprazole Mag (NexIUM*) 40 Mg Suspdr.pkt      40 MG PO QDAY@07, #30 PACKET 0 Refills         Reported         3/16/19       Cholecalciferol (Vitamin D3*) 2,000 Unit Tablet      4000 UNITS PO QDAY, #60 TAB 0 Refills         Reported         3/16/19       Cyanocobalamin (Vitamin B-12*) 1,000 Mcg Tablet.er      1000 MCG PO QDAY, #30 TAB.ER         Reported         3/16/19       Fluticasone/Vilanterol 200-25 Mcg Inh (Breo Ellipta 200-25 Mcg Inh) 1 Each     Blst.w.dev      1 PUFF INH QDAY, #1 INH 11 Refills         Prov: GARETT CRUZ         12/19/18       Losartan Potassium (Losartan*) 50 Mg Tablet      50 MG PO BID, #60 TAB 0 Refills         Reported         12/19/18       Metoprolol Succinate (Metoprolol Succinate*) 50 Mg Tab.er.24h       50 MG PO QDAY for 90 Days, #90 TAB.ER 6 Refills         Prov: Kymberly Contreras         10/29/18       Aspirin Chew (Aspirin Baby) 81 Mg Tab.chew      81 MG PO QDAY, #30 TAB.CHEW 0 Refills         Reported         10/25/18       ClonazePAM (ClonazePAM) 0.5 Mg Tablet      0.5 MG PO BID, #60 TAB 0 Refills         Reported         10/25/18       Turmeric Root Extract (Turmeric) 1 Each Capsule      1500 MG PO QDAY, CAP         Reported         10/25/18       oxyCODONE-Acetaminophen  Mg (oxyCODONE-Acetaminophen  Mg) 1 Each     Tablet      1 TAB PO Q8HR PRN for PAIN, TAB 0 Refills         Reported         10/25/18       Primidone (Mysoline*) 50 Mg Tab      50 MG PO TID         Reported         3/29/16      Current Medications      Current Medications Reviewed 6/11/19            EXAM      Vital Signs Reviewed      Gen: WDWN, Alert, NAD.        HEENT:  PERRL, EOMI.  OP, nares clear, no sinus tenderness.      Neck:  Supple, no JVD, no thyromegaly.      Lymph: No axillary, cervical, supraclavicular lymphadenopathy noted bilaterally.      Chest: Grossly clear to auscultation, no wheezes, rhonchi or crackles     appreciated, normal work of breathing noted.        CV:  RRR, no MGR, pulses 2+, equal.      Abd:  Soft, NT, ND, + BS, no HSM.      EXT:  No clubbing, no cyanosis, no edema, no joint tenderness.       Neuro:  A  Skin: No rashes or lesions.      Vitals      Vitals:             Height 6 ft 0 in / 182.88 cm           Weight 213 lbs 5 oz / 96.622448 kg           BSA 2.19 m2           BMI 28.9 kg/m2           Temperature 98.1 F / 36.72 C - Oral           Pulse 69           Respirations 14           Blood Pressure 163/78 Sitting, Right Arm           Pulse Oximetry 99%, room air            REVIEW      Results Reviewed      PCCS Results Reviewed?:  Yes Prev Lab Results, Yes Prev Radiology Results, Yes     Previous [facility] Records (I personally reviewed the patient's recent     hospitalization records. )             Assessment      COPD (chronic obstructive pulmonary disease) - J44.9            SOB (shortness of breath) - R06.02            Notes      New Medications      * amLODIPine 5 MG TABLET: 5 MG PO QDAY #30      * Ranitidine Hcl (Ranitidine*) 150 MG TABLET: 150 MG PO HS #30      * buPROPion  MG TAB.ER.24H: 150 MG PO QDAY #30      * TIOTROPIUM BROMIDE (Spiriva Respimat 2.5 mcg/Puff) 4 GM MIST.INHAL: 2 PUFFS       INH RTQDAY #1      * CETIRIZINE HCL (ZyrTEC) 10 MG TABLET: 10 MG PO HS #30      * MDI-Albuterol (Ventolin HFA) 8 GM HFA.AER.AD: 1 PUFFS INH Q4-6H PRN SHORTNESS       OF BREATH #1         Dx: COPD (chronic obstructive pulmonary disease) - J44.9      * TIOTROPIUM BROMIDE (Spiriva Respimat 2.5 mcg/Puff) 4 GM MIST.INHAL          Sample - Qty 2      * Albuterol/Ipratropium (Duoneb) 3 ML AMPUL.NEB: 3 ML INH Q4H PRN SHORTNESS OF       BREATH 30 Days #180         Instructions: DX: J43.9 NPI: [id number]      Discontinued Medications      * UMECLIDINIUM BROMIDE (Incruse Ellipta) 62.5 MCG BLST.W.DEV: 1 PUFF INH RTQDAY       #1      * Nicotine 7 Mg Patch 1 EACH PATCH.TD24: 7 MG TRANSDERM QDAY PRN NICOTINE       REPLACEMENT 30 Days #30      New Diagnostics      * 6 Min Walk With Pulse Ox, Routine         Dx: COPD (chronic obstructive pulmonary disease) - J44.9      * PFT-Comp, PrePost,DLCO,BodyBox, Week         Dx: COPD (chronic obstructive pulmonary disease) - J44.9      ASSESSMENT:       1.  Dyspnea, worse over the past 6 weeks.      2. Emphysema.       3. Recent hyponatremia resolving.       4. Obstructive sleep apnea on nightly CPAP       5. Former heavy tobacco abuse of cigarettes in remission for 3 years.             PLAN:      1. I have discussed with the patient that as he overall feels more dyspneic than    he did a year ago when his last pulmonary function test were done, I recommend     repeating pulmonary function test and 6 minute walk test. I have ordered these     today.       2.  Continue Breo 200/25  one puff daily and change his incruse to Spiriva     Respimat 2.5 mcg 2 puffs once daily. I have given him samples and showed him how    to use it today.       3. I will also give him a nebulizer machine today and prescribed DuoNeb to use     as needed.       4. I have prescribed a Ventolin rescue inhaler to use as needed.       5. For his seasonal allergies, I will start him on zyrtec 10 mg daily.       6. Continue  CPAP nightly and is requesting to try a different mask so I will     see if we can get him changed from a full face mask to a nasal mask.       7. I have reviewed with the patient that his recent CT scan of the chest done in    March 2019 during his hospitalization did not show any new or acute pulmonary     findings. He did have an ascending aortic aneurysm measuring 4.6 cm and he     already follows with a cardiothoracic surgeon at [facility] for this. He     will likely need annual CT scan of the chest for following and is already     enrolled in lung cancer screening.       8. Follow up with Dr. Kruger in 1-2  months to discuss his test results and see    how he is doing with the changes we have made today. He can call sooner if     needed.            Patient Education      Patient Education Provided:  COPD, How to use an Inhaler, How to use a Nebulizer      Time Spent:  > 50% /[facility]            Patient Education:        Chronic Obstructive Pulmonary Disease                 Disclaimer: Converted document may not contain table formatting or lab diagrams. Please see [employer] for the authenticated document.

## 2021-05-28 NOTE — PROGRESS NOTES
Patient: FRED MCCORMACK     Acct: [id number]     Report: #[id number]  UNIT #: [id number]     : 1950    Encounter Date:2018  PRIMARY CARE: BLANKA MARTINEZ  ***Signed***  --------------------------------------------------------------------------------------------------------------------  Chief Complaint      Encounter Date      2018            Primary Care Provider      BLANKA MARTINEZ            Referring Provider      BLANKA MARTINEZ            Patient Complaint      Patient is complaining of      copd/ sleep apnea            VITALS      Height 6 ft  / 182.88 cm      Weight 222 lbs 4.000 oz / 100.798640 kg      BSA 2.29 m2      BMI 30.1 kg/m2      Temperature 98.3 F / 36.83 C - Oral      Pulse 56      Respirations 12      Blood Pressure 124/60 Sitting, Left Arm      Pulse Oximetry 97%, roomair            HPI      The patient is a very pleasant 67 year old  male former cigarette     smoker as of  with COPD and obstructive sleep apnea who was a patient of     Dr. Burroughs's who is here for a second opinion.            He last saw him a couple of years ago of which he was taking Advair, Flovent     and theophylline with no to benefit. He also has obstructive sleep apnea and is     compliant with this per his compliance report. He wears his CPAP at night.  He     gets short of breath walking about  feet, worse with exertion, relieved     with rest.  It is moderate in severity and associated with a cough that is dry.      No sputum production or hemoptysis.  He does have wheezing with exertion as     well.  He denies any seasonal allergies, itchy-scratchy throat, watery eyes or     nasal congestion. He denies any chest pain or chest tightness.  He denies any     nausea, vomiting, fevers, chills or headaches.  He denies any snoring or     excessive daytime sleepiness and denies any morning headaches.  He is not on     oxygen.  I do not have any pulmonary function  tests or alpha 1 testing on     record. Dr. Burroughs's last note does outline his COPD, but of unclear severity.     He smoked one pack of cigarettes a day for 55 years, quit smoking in 2016.            I have personally reviewed the review of systems, past family, social, surgical     and medical histories and I agree with the findings.            ROS      Constitutional:  Denies: Fatigue, Fever, Weight gain, Weight loss, Chills,     Insomnia, Other      Respiratory/Breathing:  Complains of: Shortness of air, Wheezing, Denies: Cough    , Hemoptysis, Pleuritic pain, Other      Endocrine:  Denies: Polydipsia, Polyuria, Heat/cold intolerance, Diabetes, Other      Eyes:  Denies: Blurred vision, Vision Changes, Other      Ears, nose, mouth, throat:  Denies: Congestion, Dysphagia, Hearing Changes,     Nose Bleeding, Nasal Discharge, Throat pain, Tinnitus, Other      Cardiovascular:  Denies: Chest Pain, Exertional dyspnea, Peripheral Edema,     Palpitations, Syncope, Wake up Gasping for air, Orthopnea, Tachycardia, Other      Gastrointestinal:  Denies: Abdominal pain/cramping, Bloody stools, Constipation    , Diarrhea, Melena, Nausea, Vomiting, Other      Genitourinary:  Denies: Dysuria, Urinary frequency, Incontinence, Hematuria,     Urgency, Other      Musculoskeletal:  Denies: Joint Pain, Joint Stiffness, Joint Swelling, Myalgias    , Other      Hematologic/lymphatic:  DENIES: Lymphadenopathy, Bruising, Bleeding tendencies,     Other      Neurologic:  Denies: Headache, Numbness, Weakness, Seizures, Other      Psychiatric:  Denies: Anxiety, Appropriate Effect, Depression, Other      Sleep:  No: Excessive daytime sleep, Morning Headache?, Snoring, Insomnia?,     Stop breathing at sleep?, Other      Integumentary:  Denies: Rash, Dry skin, Skin Warm to Touch, Other            FAMILY/SOCIAL/MEDICAL HX      Surgical History:  Yes: Appendectomy (45-50 YRS AGO), Orthopedic Surgery (LEFT     MIDDLE TRIGGER FINGER SX), Spinal  Surgery (SURGERY ON C5-C6 IN 1996 AND 1999),     No: Abdominal Surgery, Bladder Surgery, Bowel Surgery, CABG, Cholecystectomy,     Head Surgery, Oral Surgery, Vascular Surgery      Heart - Family Hx:  Mother, Father, Grandparent      Is Father Still Living?:  No      Is Mother Still Living?:  No       Family History:  Yes      Social History:  No Tobacco Use, No Alcohol Use, No Recreational Drug use      Smoking status:  Former smoker (1 ppd x 50 y quit 8/2016)      Anticoagulation Therapy:  No      Antibiotic Prophylaxis:  No      Medical History:  Yes: Arthritis (GENERALIZED), Chronic Bronchitis/COPD,     Depression, Anxiety, Bipolar Disorder, Hemorrhoids/Rectal Prob (ACID REFLUX),     High Blood Pressure (NORMALLY CONTROLLED WITH MEDS), Reflux Disease, Shortness     Of Breath (PNEUMONIA OCT 2014), No: Asthma, Blood Disease, Chemotherapy/Cancer,     Congestive Heart Failu, Deafness or Ringing Ears, Diabetes, Seizures, Heart     Attack, Miscellaneous Medical/oth      Psychiatric History      depression/ anxiety/ bipolar            PREVENTION      Hx Influenza Vaccination:  Yes (FALL 2015)      Date Influenza Vaccine Given:  Oct 1, 2017      2 or More Falls Past Year?:  No      Fall Past Year with Injury?:  No      Hx Pneumococcal Vaccination:  Yes (NOV 2014)      Encouraged to follow-up with:  PCP regarding preventative exams.      Chart initiated by      bernadette/ ma            ALLERGIES/MEDICATIONS      Allergies:        Coded Allergies:             PENICILLINS (Verified  Allergy, Unknown, SWELLING, 7/11/18)      Uncoded Allergies:             Penicillin (Allergy, Mild, 10/13/07)      Medications    Last Reconciled on 7/11/18 14:45 by GARETT CRUZ MD      Glycopyrrolate/Formoterol Fum (Bevespi Aerosphere Inhaler) 10.7 Gm Hfa.aer.ad      2 PUFFS INH BID, #1 HFA.AER.AD 2 Refills         Prov: GARETT CRUZ         7/11/18       Glycopyrrolate/Formoterol Fum (Bevespi Aerosphere Inhaler) 10.7 Gm Hfa.aer.ad       2 PUFFS INH BID, #1 HFA.AER.AD 2 Refills         Prov: GARETT CRUZ         7/11/18       ClonazePAM (ClonazePAM) 0.5 Mg Tablet      0.5 MG PO BID, #60 TAB 0 Refills         Reported         7/11/18       amLODIPine (amLODIPine) 10 Mg Tablet      10 MG PO QDAY, #30 TAB 0 Refills         Reported         7/11/18       Furosemide* (Lasix*) 40 Mg Tablet      40 MG PO QDAY, #30 TAB 0 Refills         Reported         7/11/18       Pravastatin Sod (Pravastatin*) 40 Mg Tablet      40 MG PO QDAY, #30 TAB 0 Refills         Reported         7/11/18       Furosemide (Furosemide) 40 Mg Tablet      40 MG PO QDAY, #30 TAB         Prov: TONEY SANTIAGO         8/24/16       cloNIDine HCl (Catapres) 0.2 Mg Tab      0.2 MG PO QDAY, 0 Refills         Reported         8/9/16       Primidone (Mysoline*) 50 Mg Tab      50 MG PO TID         Reported         3/29/16       Losartan Potassium (Losartan*) 100 Mg Tablet      100 MG PO BID         Reported         3/29/16       oxyCODONE HCl/Acetaminophen (oxyCODONE HCl/Acetaminophen 5/325 MG) 1 Tab Tablet      1 TAB PO Q6HR Y for PAIN, #90         Reported         3/29/16       Ranitidine Hcl (Ranitidine*) 150 Mg Tablet      150 MG PO BID, 0 Refills         Reported         3/10/15       MDI-Albuterol (Ventolin HFA*) 18 Gm Inhaler      2 PUFFS INH BID Y for SHORTNESS OF BREATH, #1 INH 0 Refills         Reported         10/11/14      Current Medications      Current Medications Reviewed 7/11/18            Exam      EXAM      Exam      Vital Signs Reviewed.      General:  WDWN, Alert, NAD.      HEENT: PERRL, EOMI.  OP, nares clear, no sinus tenderness.      Neck: Supple, no JVD, no thyromegaly.      Lymph: No axillary, cervical, supraclavicular lymphadenopathy noted bilaterally.      Chest: Good aeration, trace bibasilar rhonchi, otherwise clear to auscultation,     tympanic to percussion bilaterally, no work of breathing noted.      CV: RRR, no MGR, pulses 2+, equal.        Abd: Soft, NT, ND,  +BS, no HSM.      EXT: No clubbing, no cyanosis, no edema, no joint tenderness.        Neuro:  A  Skin: No rashes or lesions.            Vitals      Vitals:             Height 6 ft  / 182.88 cm           Weight 222 lbs 4.000 oz / 100.675448 kg           BSA 2.29 m2           BMI 30.1 kg/m2           Temperature 98.3 F / 36.83 C - Oral           Pulse 56           Respirations 12           Blood Pressure 124/60 Sitting, Left Arm           Pulse Oximetry 97%, roomair            REVIEW      Results Reviewed      PCCS Results Reviewed?:  Yes Prev Lab Results, Yes Prev Radiology Results, Yes     Previous [facility] Records      Lab Results      I reviewed Dr. Burroughs's office notes. I reviewed his sleep study report and     his compliance report and noted no obstructive sleep apnea. He is compliant     with his CPAP machine nightly and has a normal AHI.  I also reviewed labs     showing no peripheral eosinophilia and no evidence of chronic hypercapnic     respiratory failure.      Radiographic Results                     [facility]                PACS RADIOLOGY REPORT            Patient: FRED MCCORMACK   Acct: #[id number]   Report: #[id number]            UNIT #: [id number]    DOS: 17 1124      INSURANCE:MEDICARE PART A   LOCATION:CT     : 1950            PROVIDERS      ADMITTING:     ATTENDING: INEZ SALMON      FAMILY:  MELVINA INMAN   ORDERING:  INEZ SALMON         OTHER:    DICTATING:  Manuel Hairston MD            REQ #:[id number]    CPT CODE:[id number]   EXAM:CHWO - CT CHEST without CONTRAST      REASON FOR EXAM:  AORTIC ANYURISM      REASON FOR VISIT:  AORTIC ANYURISM            *******Signed******               PROCEDURE:   CT CHEST WITHOUT CONTRAST             COMPARISON:   [facility], CT, CHEST W/ CONTRAST, 10/06/2016, 13:    21.             INDICATIONS:   AORTIC ANEYRYSM             TECHNIQUE:   CT images were created  without the administration of contrast     material.               PROTOCOL:     Standard imaging protocol performed                RADIATION:     DLP: 688mGy*cm          Automated exposure control was utilized to minimize radiation dose.              FINDINGS:         CT the chest without contrast is compared to previous study performed on 10/06/    2016. Today's study       reveals no evidence of mass or adenopathy in the base the neck or in the     periclavicular soft tissue       or axillary soft tissues. Asending thoracic aorta measures 4.7 cm x 4.2 cm in     diameter at the level       of the right pulmonary artery and is unchanged significantly since previous     study performed on       10/06/2016. The heart size is normal. The aortic valve and left ventricle     appear to be normal No       evidence of pericardial effusion. No evidence of hiatal hernia. No acute     disease in the superior       aspect of the abdomen. The mid part of the descending thoracic aorta measures     3.3 cm x 3.2 cm in       diameter and is unchanged since previous study. no evidence of pneumonia or     pleural effusion or       pneumothorax or mass in the right or left lungs. there are diffuse increased     markings throughout       the lungs consistent with chronic lung disease.             CONCLUSION:         1. Moderate aneurysmal dilatation of the asending thoracic aorta measuring 4.7     cm x 4.2 cm diameter       unchanged significantly since previous study performed 10/06/2016.              NICOLE SIEGEL MD             Electronically Signed and Approved By: NICOLE SIEGEL MD on 8/25/2017 at     13:24                        Until signed, this is an unconfirmed preliminary report that may contain      errors and is subject to change.                                              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            Cough - R05            Obesity (BMI 30-39.9) - E66.9            JIM on CPAP - G47.33, Z99.89            Notes      New Medications      * Pravastatin Sod (Pravastatin*) 40 MG TABLET: 40 MG PO QDAY #30      * Furosemide* (Lasix*) 40 MG TABLET: 40 MG PO QDAY #30      * amLODIPine 10 MG TABLET: 10 MG PO QDAY #30      * ClonazePAM 0.5 MG TABLET: 0.5 MG PO BID #60      * Glycopyrrolate/Formoterol Fum (Bevespi Aerosphere Inhaler) 10.7 GM HFA.AER.AD    : 2 PUFFS INH BID #1       Dx: COPD (chronic obstructive pulmonary disease) - J44.9      * Glycopyrrolate/Formoterol Fum (Bevespi Aerosphere Inhaler) 10.7 GM HFA.AER.AD    : 2 PUFFS INH BID #1       Dx: COPD (chronic obstructive pulmonary disease) - J44.9      Discontinued Medications      * Levofloxacin (Levaquin*) 750 MG TABLET: 750 MG PO QDAY #5      * Albuterol/Ipratropium (Duoneb) 3 ML AMPUL.NEB: 3 ML INH RTQ4H WA #120      * ARFORMOTEROL TARTRATE (Brovana) 15 MCG/2 ML VIAL.NEB: 15 MCG INH RTQ12H #60      * SPIRONOLACTONE (Aldactone) 25 MG TABLET: 25 MG PO BID #60      * Neb-Budesonide (Budesonide) 0.5 MG/2 ML AMPUL.NEB: 0.5 MG INH RTBID #60      * predniSONE* 20 MG TABLET: 20 MG PO ASDIR #20       Instructions: Taper: 3 tabs by mouth QDAY, 2 tabs QDAY,  1 tab QDAY, then half     a tab QDAY (EACH X 3 days).      * Nystatin (Nystatin*) 100,000 UNIT/1 ML ORAL.SUSP: 5 ML SWISH.SWAL QID 5 Days #    100      * NYSTATIN (Nystatin*) 15 GM POWDER: 1 APL TOPICAL BID #60      New Diagnostics      * 6 Min Walk With Pulse Ox, Routine       Dx: COPD (chronic obstructive pulmonary disease) - J44.9      * PFT-Comp, PrePost,DLCO,BodyBox, Week       Dx: COPD (chronic obstructive pulmonary disease) - J44.9      * Alpha 1 Antitrypsin , Month       Dx: COPD (chronic obstructive pulmonary disease) - J44.9      *  Discuss Need Ldct, Routine       Dx: COPD (chronic  obstructive pulmonary disease) - J44.9      * LDCT for lung ca screening, Routine       Dx: COPD (chronic obstructive pulmonary disease) - J44.9      New Office Procedures      * Prevnar 0.5 PCV13, As Soon As Possible       Pneumoc 13-Kellen Conj-Dip CRm/Pf (Prevnar 13 Syringe) 0.5 ML SYRINGE: 0.5     MILLILITER INTRAMUSCULARLY Qty 1 SYRINGE       Dx: COPD (chronic obstructive pulmonary disease) - J44.9      IMPRESSION:      1.  Dyspnea.      2. Cough.      3. COPD of unclear severity.  Also needs alpha 1 testing.        4. Obesity, BMI 30.1.      5. Obstructive sleep apnea, well-controlled with CPAP.      6. Tobacco abuse with cigarettes in remission.  Disqualified from lung cancer     screening.            PLAN:      1.  I performed exhale nitric oxide testing in the office today.  Level of 5      indicative of no eosinophilic airway inflammation.       2.  We will DC theophylline, Atrovent and Advair. We will start bevespi two     puffs twice a day.  Inhaler education provided today.      3.  Check alpha 1 antitrypsin level and genotype.      4. Check PFTs and six minute walk test.      5. Shared decision making regarding lung cancer screening performed in the     office today.  Please see my risks versus benefits part of this note for more     details. The patient was amendable to screening and I have placed an order for     LDCT for lung cancer screening.      6. He is up-to-date with Pneumovax and flu vaccine. I will give him Prevnar     today.      7. Continue CPAP as prescribed with CPAP pressure of 9 cm of water.      8. Have patient follow up in 2-3 months to reassess symptoms and discuss test     results.            Patient Education      Patient Education Provided:  COPD, How to use an Inhaler, Sleep Apnea            Patient Education:        Chronic Obstructive Pulmonary Disease      Low-Dose CT Scan May Be Effective Screening Tool for Lung Cancer in People            LDCT      Assessment      Nicotine  dependence, cigarettes, in remission V15.82/F17.211            Plan      LDCT Orders:   to discuss lung ca screen, LDCT for lung ca screeing            Determine need to perform LDCT      Determined ne to perform LDC:  Pt between ages of 55-77 years old.      Smoking history:  25-50 pack years            CT History      Pt has not had a reg CT  last 12 mo            Time Spent/Education      Greater than 50% For Edcuation:  Yes      LDCT Patient Education            * Patient was presented with the benefits and harms of screening to include:         The possible need for follow-up diagnostic testing         Over Diagnosis         False Positive Rate         Total Radiation Exposure            * Counseled on the importance of:         Adherence to annual lung cancer LDCT screening         Impact of co-morbidities         The ability or willingness to undergo diagnosis of treatment               * Counseled on the importance of cigarette cessation.      * Counseled on the importance of maintaining cigarette smoking abstinence.      * Handout provided regarding tobacco cessation interventions.      * Order for lung cancer screening with LDCT was given to the patient.                 Disclaimer: Converted document may not contain table formatting or lab diagrams. Please see [email or web address] for the authenticated document.

## 2021-05-28 NOTE — PROGRESS NOTES
Patient: FRED MCCORMACK     Acct: [id number]     Report: #[id number]  UNIT #: [id number]     : 1950    Encounter Date:2019  PRIMARY CARE: BLANKA MARTINEZ  ***Signed***  --------------------------------------------------------------------------------------------------------------------  Chief Complaint      Encounter Date      Aug 7, 2019            Primary Care Provider      BLANKA MARTINEZ            Referring Provider      BLANKA MARTINEZ            Patient Complaint      Patient is complaining of      Pt here for 2 month follow/ 6 min walk/PFT results/COPD            VITALS      Height 6 ft 0 in / 182.88 cm      Weight 212 lbs 4 oz / 96.950504 kg      BSA 2.18 m2      BMI 28.8 kg/m2      Temperature 98.5 F / 36.94 C - Oral      Pulse 60      Respirations 14      Blood Pressure 174/84 Sitting, Left Arm      Pulse Oximetry 96%, roomair            HPI      The patient is a very pleasant 68 year old  male former cigarette     smoker with COPD here for follow up.  He is doing well on triple inhaler     therapy.  PFTs were done compared to last visit and they are actually slightly     better.  His airtrapping is much better and his FEV1 is around 80%.  He is still    having very significant dyspnea.  He gets short of breath with almost any     activity.  He denies any coughing, wheezing, chest pain or hemoptysis.  His     weight is stable, but he states that he is very inactive and is asking about     doing pulmonary rehab or an exercise program.  He denies any leg swelling,     orthopnea or paroxysmals nocturnal dyspnea. He is a former smoker. He is asking     to switch to nasal pillows as he is a full mouth breather and is having issues     with his dentures and his CPAP interface. He is able to perform his ADLs without    difficulty. Denies any swollen glands or lymph nodes of the head and neck.            I have personally reviewed the review of systems, past family, social,  surgical     and medical histories and I agree with the findings.            ROS      Constitutional:  Denies: Fatigue, Fever, Weight gain, Weight loss, Chills,     Insomnia, Other      Respiratory/Breathing:  Complains of: Shortness of air, Wheezing; Denies: Cough,    Hemoptysis, Pleuritic pain, Other      Endocrine:  Denies: Polydipsia, Polyuria, Heat/cold intolerance, Diabetes, Other      Eyes:  Denies: Blurred vision, Vision Changes, Other      Ears, nose, mouth, throat:  Denies: Congestion, Dysphagia, Hearing Changes, Nose    Bleeding, Nasal Discharge, Throat pain, Tinnitus, Other      Cardiovascular:  Denies: Chest Pain, Exertional dyspnea, Peripheral Edema,     Palpitations, Syncope, Wake up Gasping for air, Orthopnea, Tachycardia, Other      Gastrointestinal:  Denies: Abdominal pain/cramping, Bloody stools, Constipation,    Diarrhea, Melena, Nausea, Vomiting, Other      Genitourinary:  Denies: Dysuria, Urinary frequency, Incontinence, Hematuria,     Urgency, Other      Musculoskeletal:  Denies: Joint Pain, Joint Stiffness, Joint Swelling, Myalgias,    Other      Hematologic/lymphatic:  DENIES: Lymphadenopathy, Bruising, Bleeding tendencies,     Other      Neurologic:  Denies: Headache, Numbness, Weakness, Seizures, Other      Psychiatric:  Denies: Anxiety, Appropriate Effect, Depression, Other      Sleep:  No: Excessive daytime sleep, Morning Headache?, Snoring, Insomnia?, Stop    breathing at sleep?, Other      Integumentary:  Denies: Rash, Dry skin, Skin Warm to Touch, Other            FAMILY/SOCIAL/MEDICAL HX      Surgical History:  Yes: Appendectomy (45-50 YRS AGO), Orthopedic Surgery (LEFT     MIDDLE TRIGGER FINGER SX), Spinal Surgery (SURGERY ON C5-C6 IN 1996 AND 1999);     No: Abdominal Surgery, Bladder Surgery, Bowel Surgery, CABG, Cholecystectomy,     Head Surgery, Oral Surgery, Vascular Surgery      Heart - Family Hx:  Mother, Father, Grandparent      Is Father Still Living?:  No      Is Mother  Still Living?:  No       Family History:  Yes      Social History:  No Tobacco Use, No Alcohol Use, No Recreational Drug use      Smoking status:  Former smoker (1 ppd x 50 y quit 8/2016)      Smoking history:  25-50 pack years      Anticoagulation Therapy:  No      Antibiotic Prophylaxis:  No      Medical History:  Yes: Arthritis (GENERALIZED), Chronic Bronchitis/COPD,     Depression, Anxiety, Bipolar Disorder, Hemorrhoids/Rectal Prob (ACID REFLUX),     High Blood Pressure ( CONTROLLED WITH MEDS), Reflux Disease, Shortness Of Breath    (PNEUMONIA OCT 2014); No: Asthma, Blood Disease, Chemotherapy/Cancer, Congestive    Heart Failu, Deafness or Ringing Ears, Diabetes, Seizures, Heart Attack, Sinus     Trouble, Miscellaneous Medical/oth      Psychiatric History      Depression/Anxiety/Bipolar disorder            PREVENTION      Hx Influenza Vaccination:  Yes      Date Influenza Vaccine Given:  Sep 1, 2018      Influenza Vaccine Declined:  No      2 or More Falls Past Year?:  No      Fall Past Year with Injury?:  No      Hx Pneumococcal Vaccination:  Yes      Encouraged to follow-up with:  PCP regarding preventative exams.      Chart initiated by      Ganga Verde MA            ALLERGIES/MEDICATIONS      Allergies:        Coded Allergies:             PENICILLINS (Verified  Allergy, Unknown, SWELLING, 8/7/19)      Uncoded Allergies:             Penicillin (Allergy, Mild, 10/13/07)      Medications    Last Reconciled on 8/7/19 10:32 by GARETT CRUZ MD      Metoprolol Succinate (Metoprolol Succinate) 200 Mg Tab.er.24h      100 MG PO QDAY, #15 TAB.SR.24H         Reported         8/7/19       Albuterol/Ipratropium (Duoneb) 3 Ml Ampul.neb      3 ML INH Q4H PRN for SHORTNESS OF BREATH for 30 Days, #180 NEB 5 Refills         Prov: Lauren Carrillo PA-C         6/12/19       Tiotropium Bromide (Spiriva Respimat 2.5 mcg/Puff) 4 Gm Mist.inhal               Prov: Lauren Carrillo PA-C         6/11/19       MDI-Albuterol  (Ventolin HFA) 8 Gm Hfa.aer.ad      1 PUFFS INH Q4-6H PRN for SHORTNESS OF BREATH, #1 MDI 2 Refills         Prov: Lauren Carrillo PA-C         6/11/19       Tiotropium Bromide (Spiriva Respimat 2.5 mcg/Puff) 4 Gm Mist.inhal      2 PUFFS INH RTQDAY, #1 MDI 5 Refills         Prov: Lauren Carrillo PA-C         6/11/19       buPROPion XL (buPROPion XL) 150 Mg Tab.er.24h      150 MG PO QDAY, #30 TAB.SR.24H         Reported         6/11/19       Ranitidine Hcl (Ranitidine*) 150 Mg Tablet      150 MG PO HS, #30 TAB 0 Refills         Reported         6/11/19       amLODIPine (amLODIPine) 5 Mg Tablet      5 MG PO QDAY, #30 TAB         Reported         6/11/19       Cholecalciferol (Vitamin D3*) 2,000 Unit Tablet      4000 UNITS PO QDAY, #60 TAB 0 Refills         Reported         3/16/19       Cyanocobalamin (Vitamin B-12*) 1,000 Mcg Tablet.er      1000 MCG PO QDAY, #30 TAB.ER         Reported         3/16/19       Fluticasone/Vilanterol 200-25 Mcg Inh (Breo Ellipta 200-25 Mcg Inh) 1 Each Blst.    w.dev      1 PUFF INH QDAY, #1 INH 11 Refills         Prov: GARETT CRUZ         12/19/18       Losartan Potassium (Losartan*) 50 Mg Tablet      50 MG PO BID, #60 TAB 0 Refills         Reported         12/19/18       Aspirin Chew (Aspirin Baby) 81 Mg Tab.chew      81 MG PO QDAY, #30 TAB.CHEW 0 Refills         Reported         10/25/18       ClonazePAM (ClonazePAM) 0.5 Mg Tablet      0.5 MG PO BID, #60 TAB 0 Refills         Reported         10/25/18       oxyCODONE-Acetaminophen  Mg (oxyCODONE-Acetaminophen  Mg) 1 Each     Tablet      1 TAB PO Q8HR PRN for PAIN, TAB 0 Refills         Reported         10/25/18       Primidone (Mysoline*) 50 Mg Tab      50 MG PO TID         Reported         3/29/16      Current Medications      Current Medications Reviewed 8/7/19            EXAM      Vital Signs Reviewed.      General:  WDWN, Alert, NAD.      HEENT: PERRL, EOMI.  OP, nares clear, no sinus tenderness.      Chest: Good  aeration, clear to auscultation bilaterally, tympanic to percussion     bilaterally, no work of breathing noted.      CV: RRR, no MGR, pulses 2+, equal.        Abd: Soft, NT, ND, +BS, no HSM.      EXT: No clubbing, no cyanosis, no edema.        Neuro:  A  Skin: No rashes or lesions.      Vitals      Vitals:             Height 6 ft 0 in / 182.88 cm           Weight 212 lbs 4 oz / 96.509648 kg           BSA 2.18 m2           BMI 28.8 kg/m2           Temperature 98.5 F / 36.94 C - Oral           Pulse 60           Respirations 14           Blood Pressure 174/84 Sitting, Left Arm           Pulse Oximetry 96%, roomair            REVIEW      Results Reviewed      PCCS Results Reviewed?:  Yes Prev Lab Results, Yes Prev Radiology Results, Yes     Previous [facility] Records      Lab Results      I reviewed Shane Harvey last office note. I reviewed a CT of his chest,     abdomen and pelvis in 03/2019.  I also personally reviewed PFTs that were done     in 06/2019.  Six minute walk test was also done in 06/2019 showing no     desaturation with submaximal exertion after 1000 feet.  I reviewed labs from     06/2019 showing no evidence of chronic hypercapnic respiratory failure.            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  Chronic dyspnea on exertion.      2.  Emphysema, PFTs appear  improved.         3.  Obstructive sleep apnea, well-controlled with CPAP.      4. Tobacco abuse with cigarettes in remission for four years.        5. Physical deconditioning.             PLAN:        1.  We will contact [facility] about switching him over to nasal     pillows and chin strap per his preference.      2.  Continue Breo and Spiriva Respimat with albuterol as needed.      3. Refer patient to pulmonary rehab as I think a large portion of his dyspnea is    secondary to physical deconditioning.      4.  Continue Zyrtec.      5. Will be eligible for LDCT screen in 03/2020.  Of note, he does have an aortic    aneurysm noted on CT for which he follows up with a cardiothoracic surgeon in     [facility].      6. He is up-to-date with his flu, Prevnar and Pneumovax.      7. Follow up with me in 4-6 months.            Patient Education            Patient Education:        Chronic Obstructive Pulmonary Disease                 Disclaimer: Converted document may not contain table formatting or lab diagrams. Please see [employer] for the authenticated document.

## 2021-05-28 NOTE — PROGRESS NOTES
Patient: FRED MCCORMACK     Acct: [id number]     Report: #[id number]  UNIT #: [id number]     : 1950    Encounter Date:2020  PRIMARY CARE: BLANKA MARTINEZ  ***Signed***  --------------------------------------------------------------------------------------------------------------------  Chief Complaint      Encounter Date      2020            Primary Care Provider      BLANKA MARTIENZ            Referring Provider      BLANKA MARTINEZ            Patient Complaint      Patient is complaining of      f/u pulmonary rehab            VITALS      Height 6 ft  / 182.88 cm      Weight 205 lbs  / 92.983447 kg      BSA 2.15 m2      BMI 27.8 kg/m2      Temperature 98.5 F / 36.94 C - Oral      Pulse 58      Respirations 12      Blood Pressure 152/71 Sitting, Right Arm      Pulse Oximetry 99%, roomair            HPI      The patient is a very pleasant 69 year old  male with COPD here for     follow up.  Since last office visit he was going to pulmonary rehab and feeling     better, but then had a knee replacement and he has not gone back. He plans on     going back in the next few weeks. He felt like it was helping his breathing. He     gets short of breath walking about 1000 feet or going up a flight of steps,     moderate in severity, worse with exertion, relieved with rest. He denies any     coughing, wheezing, headaches, chest pain or hemoptysis. He is a former heavy     cigarette smoker and has not had a cigarette since 2016 after a 50 pack year     history. He does have unintentional weight loss and states he has lost 50 pounds    over the last 4-5 months. He denies hemoptysis or chest pain. He denies any     abdominal bloating, constipation, diarrhea or blood bowel movements. He states     he is not up-to-date with his colonoscopies, just states he cannot eat.  He also    has increasing fatigue. He is wearing his CPAP machine nightly.  Denies any     nausea, vomiting, fevers,  "chills, headaches or chest pains.  He is able to     perform ADLs without difficulty.  Denies any swollen glands or lymph nodes of     the head and neck.            I have personally reviewed the review of systems, past family, social, surgical     and medical histories and I agree with the findings.            ROS      Constitutional:  Complains of: Weight loss (\" I dont eat\"); Denies: Fatigue,     Fever, Weight gain, Chills, Insomnia, Other      Respiratory/Breathing:  Complains of: Shortness of air, Wheezing, Cough; Denies:    Hemoptysis, Pleuritic pain, Other      Endocrine:  Denies: Polydipsia, Polyuria, Heat/cold intolerance, Diabetes, Other      Ears, nose, mouth, throat:  Denies: Mouth lesions, Thrush, Throat pain, H    oarseness, Allergies/Hay Fever, Post Nasal Drip, Headaches, Recent Head Injury,     Nose Bleeding, Neck Stiffness, Thyroid Mass, Hearing Loss, Ear Fullness, Dry     Mouth, Nasal or Sinus Pain, Dry Lips, Nasal discharge, Nasal congestion, Other      Cardiovascular:  Denies: Palpitations, Syncope, Claudication, Chest Pain, Wake     up Gasping for air, Leg Swelling, Irregular Heart Rate, Cyanosis, Dyspnea on     Exertion, Other      Gastrointestinal:  Denies: Nausea, Constipation, Diarrhea, Abdominal pain,     Vomiting, Difficulty Swallowing, Reflux/Heartburn, Dysphagia, Jaundice,     Bloating, Melena, Bloody stools, Other      Genitourinary:  Denies: Urinary frequency, Incontinence, Hematuria, Urgency,     Nocturia, Dysuria, Testicular problems, Other      Musculoskeletal:  Denies: Joint Pain, Joint Stiffness, Joint Swelling, Myalgias,    Other      Hematologic/lymphatic:  DENIES: Lymphadenopathy, Bruising, Bleeding tendencies,     Other      Neurological:  Denies: Headache, Numbness, Weakness, Seizures, Other      Psychiatric:  Denies: Anxiety, Appropriate Effect, Depression, Other      Sleep:  No: Excessive daytime sleep, Morning Headache?, Snoring, Insomnia?, Stop    breathing at sleep?, " Other      Integumentary:  Denies: Rash, Dry skin, Skin Warm to Touch, Other      Immunologic/Allergic:  Denies: Latex allergy, Seasonal allergies, Asthma,     Urticaria, Eczema, Other      Immunization status:  No: Up to date            FAMILY/SOCIAL/MEDICAL HX      Surgical History:  Yes: Appendectomy (45-50 YRS AGO), Orthopedic Surgery (LEFT     MIDDLE TRIGGER FINGER SX rtk), Spinal Surgery (SURGERY ON C5-C6 IN 1996 AND     1999); No: Abdominal Surgery, Bladder Surgery, Bowel Surgery, CABG,     Cholecystectomy, Head Surgery, Oral Surgery, Vascular Surgery      Heart - Family Hx:  Mother, Father, Grandparent      Is Father Still Living?:  No      Is Mother Still Living?:  No       Family History:  Yes      Social History:  No Tobacco Use, No Alcohol Use, No Recreational Drug use      Smoking status:  Former smoker (1 ppd x 50 y quit 8/2016)      Smoking history:  25-50 pack years      Anticoagulation Therapy:  No      Antibiotic Prophylaxis:  No      Medical History:  Yes: Arthritis (GENERAlized, R KNEE), Chronic Bronchitis/COPD     (USES INHALERS), Depression, Anxiety, Bipolar Disorder, Hemorrhoids/Rectal Prob     (ACID REFLUX), High Blood Pressure ( CONTROLLED WITH MEDS), Reflux Disease,     Shortness Of Breath; No: Asthma, Blood Disease, Chemotherapy/Cancer, Congestive     Heart Failu, Deafness or Ringing Ears, Diabetes, Seizures, Sinus Trouble,     Miscellaneous Medical/oth      Psychiatric History      depression/ anxiety/ bipolar            PREVENTION      Hx Influenza Vaccination:  Yes      Date Influenza Vaccine Given:  Sep 1, 2019      Influenza Vaccine Declined:  No      2 or More Falls Past Year?:  No      Fall Past Year with Injury?:  No      Hx Pneumococcal Vaccination:  Yes      Encouraged to follow-up with:  PCP regarding preventative exams.      Chart initiated by      ran flores/ ma            ALLERGIES/MEDICATIONS      Allergies:        Coded Allergies:             PENICILLINS (Verified   Allergy, Severe, HIVES/SWELLING, 1/21/20)                  PT REPORTS HE HAS TAKEN KEFLEX W/O REACTION IN THE PAST      Medications    Last Reconciled on 1/21/20 11:30 by GARETT CRUZ MD      Fluticasone/Vilanterol 200-25 Mcg Inh (Breo Ellipta 200-25 Mcg Inh) 1 Each     Blst.w.dev      1 PUFF INH QDAY, #3 INH 3 Refills         Prov: GARETT CRUZ         12/27/19       Permethrin (Permethrin 5% Cream) 60 Gm Cream..g.      1 APL TOPICAL ONCE, #1 GM 1 Refill         Prov: ELIZABETH DAIGLE [facility]         12/23/19       MDI-Albuterol (Ventolin HFA) 18 Gm Hfa.aer.ad      2 PUFFS INH Q6H PRN for SHORTNESS OF BREATH, #1 MDI 3 Refills         Prov: GARETT CRUZ         12/18/19       Apixaban (Eliquis) 2.5 Mg Tablet      2.5 MG PO BID for 5 Days, #10 TAB         Prov: Sweta Nichols         11/23/19       oxyCODONE-Acetaminophen  Mg (Endocet  Mg) 1 Each Tablet      1 TAB PO Q3-4H PRN for JOINT PAIN, #45 TAB         Prov: Sweta Nichols         11/23/19       Tiotropium Bromide (Spiriva Respimat 2.5 mcg/Puff) 4 Gm Mist.inhal      2 PUFFS INH QDAY, #3 MDI 3 Refills         Prov: GARETT CRUZ         11/20/19       Famotidine (Famotidine) 40 Mg Tablet      40 MG PO HS for 30 Days, #30 TAB         Reported         11/15/19       Metoprolol Succinate (Metoprolol Succinate) 50 Mg Tab.er.24h      150 MG PO QDAY, #60 TAB.SR.24H 0 Refills         Reported         11/15/19       Primidone (Mysoline*) 50 Mg Tablet      50 MG PO TID, TAB         Reported         10/29/19       buPROPion HCl XL (Wellbutrin XL) 300 Mg Tab.er.24h      300 MG PO QDAY for 30 Days, #30 TAB.ER.24H         Reported         10/29/19       Cholecalciferol (Vitamin D3) (Vitamin D3) 1,000 Unit Tab      2000 UNITS PO QDAY, #60 TAB 0 Refills         Reported         10/29/19       Cyanocobalamin (Vitamin B-12*) 500 Mcg Tab      500 MCG PO QDAY, #30 TAB         Reported         10/29/19       (Tumeric Curcumin)   No Conflict Check      3 TAB PO QAM          Reported         10/29/19       Albuterol/Ipratropium (Duoneb) 3 Ml Ampul.neb      3 ML INH Q4H PRN for SHORTNESS OF BREATH for 30 Days, #180 NEB 5 Refills         Prov: Lauren Carrillo PA-C         6/12/19       amLODIPine (amLODIPine) 5 Mg Tablet      5 MG PO QDAY, #30 TAB         Reported         6/11/19       Losartan Potassium (Losartan*) 50 Mg Tablet      50 MG PO BID, #60 TAB 0 Refills         Reported         12/19/18       clonazePAM (clonazePAM) 0.5 Mg Tablet      0.5 MG PO BID, #60 TAB 0 Refills         Reported         10/25/18       oxyCODONE-Acetaminophen  Mg (oxyCODONE-Acetaminophen  Mg) 1 Each     Tablet      1 TAB PO Q8HR PRN for PAIN, TAB 0 Refills         Reported         10/25/18      Current Medications      Current Medications Reviewed 1/21/20            EXAM      Vital Signs Reviewed.      General:  WDWN, Alert, NAD.      HEENT: PERRL, EOMI.  OP, nares clear, no sinus tenderness.      Chest: Good aeration, clear to auscultation bilaterally, tympanic to percussion     bilaterally, no work of breathing noted.      CV: RRR, no MGR, pulses 2+, equal.        Abd: Soft, NT, ND, +BS, no HSM.      EXT: No clubbing, no cyanosis, no edema.        Neuro:  A  Skin: No rashes or lesions.      Vtials      Vitals:             Height 6 ft  / 182.88 cm           Weight 205 lbs  / 92.621504 kg           BSA 2.15 m2           BMI 27.8 kg/m2           Temperature 98.5 F / 36.94 C - Oral           Pulse 58           Respirations 12           Blood Pressure 152/71 Sitting, Right Arm           Pulse Oximetry 99%, roomair            REVIEW      Results Reviewed      PCCS Results Reviewed?:  Yes Prev Lab Results, Yes Prev Radiology Results, Yes     Previous [facility] Records      Lab Results      i personally reviewed my last office note. I personally reviewed a CT scan of     the chest that was done in 01/2020 and compared it to the previous one.  I also     reviewed from 10/2019 showing a hemoglobin of  12.2 with normal MCV and no     evidence of chronic hypercapnic respiratory failure.      Radiographic Results               [facility]                PACS RADIOLOGY REPORT            Patient: FRED MCCORMACK   Acct: #[id number]   Report: #[id number]            UNIT #: [id number]    DOS: 20 1244      INSURANCE:MEDICARE PART A   LOCATION:CT     : 1950            PROVIDERS      ADMITTING:     ATTENDING: GARETT CRUZ      FAMILY:  BLANKA MARTINEZ [facility]   ORDERING:  GARETT CRUZ         OTHER:    DICTATING:  Bridger Childs MD            REQ #:[id number]   [facility]      REASON FOR EXAM:  NON SPEC ABN FINDING LUNG FIELD      REASON FOR VISIT:  NON SPEC ABN FINDING LUNG FIELD            *******Signed******         PROCEDURE:   CT LOW DOSE CHEST SCREENING             COMPARISON:   [facility], CT, CT LOW DOSE CHEST SCREENING,     10/08/2019, 10:46.             REASON FOR SCREENING:   Patient is 69 years of age, asymptomatic, and has a     smoking history of more       than 30 pack years.      SMOKING STATUS:   Former smoker.  Years since quitting:                 SCREENING VISIT:   3 Month.                 TECHNIQUE:   Axial unenhanced LDCT images from the apices through mid-kidney     were obtained.       Evaluation of solid organs and vascular structures is suboptimal due to the lack    of IV contrast.       Imaging was performed on a Siemens Sensation 64 CT scanner.             RADIATION:   CT Dose Index Vol (CTDIvol):    4.55  mGy         Dose Length Product (DLP):    189  mGy-cm             DIAGNOSTIC QUALITY:   Satisfactory             FINDINGS:         The rounded densities previously noted in the left mainstem bronchus and trachea    are no longer       present, consistent with these having represented secretions/mucus.             Multiple sub cm mediastinal lymph nodes are nonpathologic by size  criteria.  No     hilar or axillary       adenopathy is seen.  No pleural or pericardial effusion is present.  Mild     coronary artery       atherosclerotic calcification is present.             Mild varicose bronchiectasis is noted in the lingula.  No suspicious pulmonary     nodule or mass is       identified.             The visualized upper abdomen appears unremarkable.  No focal osseous lesion is     seen.             CONCLUSION:         1. Interval resolution the nodular densities previously noted in the trachea and    left mainstem       bronchus.      2. No suspicious pulmonary nodule or mass consistent with LungRADS category 1.     Follow-up low-dose       chest CT in 12 months is recommended.        3. Mild varicose bronchiectasis in the lingula, unchanged              Bridger Childs M.D.             Electronically Signed and Approved By: Bridger Childs M.D. on 1/14/2020 at 13:54                           Until signed, this is an unconfirmed preliminary report that may contain      errors and is subject to change.                                              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  Unintentional weight loss concerning for a potential malignancy.      2.  Poor appetite.      3. Chronic dyspnea at baseline.      4.  Emphysema.      5. Obstructive sleep apnea, well-controlled with CPAP.      6. Tobacco abuse with cigarettes in remission.      7.  Physical  deconditioning.      8. Chronic dyspnea.         9. Stable aortic aneurysm.              PLAN:      1.  We will check a CMP and send for a stat CT of the abdomen and pelvis given     unintentional weight loss and poor appetite, depending on these results, the     patient may need a GI referral for upper and lower endoscopy either way.      2.  Continue Breo and Spiriva Respimat with albuterol as needed.      3. Refer patient back to pulmonary rehab as he needs to go back now that he is     6-8 weeks post knee replacement.      4. Continue annual LDCT for lung cancer screening. No evidence of pulmonary     cause of unintentional weight loss.      5. Does have an aortic aneurysm for which he follows up with a cardiothoracic     surgeon in [place].      6. Up-to-date with flu, Prevnar and Pneumovax.      7. Follow up with me in 4-6 months.            Patient Education            Patient Education:        Chronic Obstructive Pulmonary Disease            Electronically signed by GARETT CRUZ  01/28/2020 06:58       Disclaimer: Converted document may not contain table formatting or lab diagrams. Please see [employer] for the authenticated document.

## 2021-05-28 NOTE — PROGRESS NOTES
Patient: FRED MCCORMACK     Acct: [id number]     Report: #[id number]  UNIT #: [id number]     : 1950    Encounter Date:2018  PRIMARY CARE: BLANKA MARTINEZ  ***Signed***  --------------------------------------------------------------------------------------------------------------------  Chief Complaint      Encounter Date      Dec 19, 2018            Primary Care Provider      BLANKA MARTINEZ            Referring Provider      BLANKA MARTINEZ            Patient Complaint      Patient is complaining of      3 mo f/u emphysema            VITALS      Height 6 ft 0 in / 182.88 cm      Weight 220 lbs 1 oz / 99.134458 kg      BSA 2.22 m2      BMI 29.8 kg/m2      Temperature 98.4 F / 36.89 C - Oral      Pulse 81      Respirations 14      Blood Pressure 149/76 Sitting, Left Arm      Pulse Oximetry 95%, roomair            HPI      The patient is a very pleasant 68 year old  male former cigarette     smoker with emphysema here for follow up and obstructive sleep apnea.  Since     last visit, he has been doing very well on his current therapies. He has been     using a CPAP nightly.  He has been on Breo 200/25 one puff daily with marked     improvement in his symptoms. He is less breathless and gets short of breath now     with walking about 1500 feet or up three flights of steps, mild in severity,     worse with exertion and relieved with rest.  He has intermittent dry cough with     no sputum production or hemoptysis. He denies any wheezing or rescue inhaler     use. Denies any nausea, vomiting, fevers, chills, headaches or chest pain.  He     is using a CPAP nightly without issues. He is compliant with it.  He denies any     hemoptysis or chest pain.  He quit smoking in 2016 after a 55 pack year     cigarette smoking history.            I have personally reviewed the review of systems, past family, social, surgical     and medical histories and I agree with the findings.            REBA       Constitutional:  Denies: Fatigue, Fever, Weight gain, Weight loss, Chills,     Insomnia, Other      Respiratory/Breathing:  Complains of: Shortness of air; Denies: Wheezing, Cough,    Hemoptysis, Pleuritic pain, Other      Endocrine:  Denies: Polydipsia, Polyuria, Heat/cold intolerance, Diabetes, Other      Eyes:  Denies: Blurred vision, Vision Changes, Other      Ears, nose, mouth, throat:  Denies: Mouth lesions, Thrush, Throat pain, Hoar    seness, Allergies/Hay Fever, Post Nasal Drip, Headaches, Recent Head Injury,     Nose Bleeding, Neck Stiffness, Thyroid Mass, Hearing Loss, Ear Fullness, Dry     Mouth, Nasal or Sinus Pain, Dry Lips, Nasal discharge, Nasal congestion, Other      Cardiovascular:  Denies: Palpitations, Syncope, Claudication, Chest Pain, Wake     up Gasping for air, Leg Swelling, Irregular Heart Rate, Cyanosis, Dyspnea on     Exertion, Other      Gastrointestinal:  Denies: Nausea, Constipation, Diarrhea, Abdominal pain,     Vomiting, Difficulty Swallowing, Reflux/Heartburn, Dysphagia, Jaundice,     Bloating, Melena, Bloody stools, Other      Genitourinary:  Denies: Urinary frequency, Incontinence, Hematuria, Urgency,     Nocturia, Dysuria, Testicular problems, Other      Musculoskeletal:  Denies: Joint Pain, Joint Stiffness, Joint Swelling, Myalgias,    Other      Hematologic/lymphatic:  DENIES: Lymphadenopathy, Bruising, Bleeding tendencies,     Other      Neurological:  Denies: Headache, Numbness, Weakness, Seizures, Other      Psychiatric:  Denies: Anxiety, Appropriate Effect, Depression, Other      Sleep:  No: Excessive daytime sleep, Morning Headache?, Snoring, Insomnia?, Stop    breathing at sleep?, Other      Integumentary:  Denies: Rash, Dry skin, Skin Warm to Touch, Other      Immunologic/Allergic:  Denies: Latex allergy, Seasonal allergies, Asthma,     Urticaria, Eczema, Other      Immunization status:  No: Up to date            FAMILY/SOCIAL/MEDICAL HX      Surgical History:  Yes:  Appendectomy (45-50 YRS AGO), Orthopedic Surgery (LEFT     MIDDLE TRIGGER FINGER SX), Spinal Surgery (SURGERY ON C5-C6 IN 1996 AND 1999);     No: Abdominal Surgery, Bladder Surgery, Bowel Surgery, CABG, Cholecystectomy,     Head Surgery, Oral Surgery, Vascular Surgery      Heart - Family Hx:  Mother, Father, Grandparent      Is Father Still Living?:  No      Is Mother Still Living?:  No       Family History:  Yes      Social History:  No Tobacco Use, No Alcohol Use, No Recreational Drug use      Smoking status:  Former smoker (1 ppd x 50 y quit 8/2016)      Smoking history:  25-50 pack years      Anticoagulation Therapy:  No      Antibiotic Prophylaxis:  No      Medical History:  Yes: Arthritis (GENERALIZED), Chronic Bronchitis/COPD,     Depression, Anxiety, Bipolar Disorder, Hemorrhoids/Rectal Prob (ACID REFLUX),     High Blood Pressure ( CONTROLLED WITH MEDS), Reflux Disease, Shortness Of Breath    (PNEUMONIA OCT 2014); No: Asthma, Blood Disease, Chemotherapy/Cancer, Congestive    Heart Failu, Deafness or Ringing Ears, Diabetes, Seizures, Heart Attack, Sinus     Trouble, Miscellaneous Medical/oth      Psychiatric History      depression/ anxiety            PREVENTION      Hx Influenza Vaccination:  Yes (FALL 2018)      Date Influenza Vaccine Given:  Sep 1, 2017      2 or More Falls Past Year?:  No      Fall Past Year with Injury?:  No      Hx Pneumococcal Vaccination:  Yes (NOV 2014)      Encouraged to follow-up with:  PCP regarding preventative exams.      Chart initiated by      ran flores/ ma            ALLERGIES/MEDICATIONS      Allergies:        Coded Allergies:             PENICILLINS (Verified  Allergy, Unknown, SWELLING, 12/19/18)      Uncoded Allergies:             Penicillin (Allergy, Mild, 10/13/07)      Medications    Last Reconciled on 12/19/18 11:55 by GARETT CRUZ MD      Fluticasone/Vilanterol 200-25 Mcg Inh (Breo Ellipta 200-25 Mcg Inh) 1 Each     Blst.w.dev      1 PUFF INH QDAY, #1 INH 11  Refills         Prov: GARETT CRUZ         12/19/18       Losartan Potassium (Losartan*) 50 Mg Tablet      50 MG PO BID, #60 TAB 0 Refills         Reported         12/19/18       (Triamterene/Hctz 75/50) 1 EA TAB No Conflict Check      1 EA PO QDAY for 90 Days, 6 Refills         Prov: Kymberly Contreras         10/29/18       Metoprolol Succinate (Metoprolol Succinate*) 50 Mg Tab.er.24h      50 MG PO QDAY for 90 Days, #90 TAB.ER 6 Refills         Prov: Kymberly Contreras         10/29/18       Aspirin Chew (Aspirin Baby) 81 Mg Tab.chew      81 MG PO QDAY, #30 TAB.CHEW 0 Refills         Reported         10/25/18       ClonazePAM (ClonazePAM) 0.5 Mg Tablet      0.5 MG PO BID, #60 TAB 0 Refills         Reported         10/25/18       Cholecalciferol (Vitamin D3) 5,000 Unit Capsule      5000 UNITS PO QDAY for 30 Days, #30 CAP         Reported         10/25/18       Turmeric Root Extract (Turmeric) 1 Each Capsule      500 MG PO QDAY, CAP         Reported         10/25/18       Cyanocobalamin (Vitamin B-12*) Unknown Strength Tablet      PO QDAY, TAB         Reported         10/25/18       Gabapentin (Gabapentin) 800 Mg Tablet      800 MG PO TID         Reported         10/25/18       oxyCODONE HCl/Acetaminophen (oxyCODONE HCl/Acetaminophen 10/325 MG) 1 Each     Tablet      1 TAB PO Q8HR PRN for PAIN, TAB 0 Refills         Reported         10/25/18       Primidone (Mysoline*) 50 Mg Tab      50 MG PO TID         Reported         3/29/16       Ranitidine Hcl (Ranitidine*) 150 Mg Tablet      150 MG PO BID, 0 Refills         Reported         3/10/15      Current Medications      Current Medications Reviewed 12/19/18            EXAM      Vital Signs Reviewed      Gen: WDWN, Alert, NAD.        HEENT:  PERRL, EOMI.  OP, nares clear, no sinus tenderness.      Chest:  Good aeration, clear to auscultation bilaterally, tympanic to percussion    bilaterally, no work of breathing noted.       CV:  RRR, no MGR, pulses 2+, equal.       Abd:  Soft, NT, ND, + BS, no HSM.      EXT:  No clubbing, no cyanosis, no edema.       Neuro:  A  Skin: No rashes or lesions.      Vtials      Vitals:             Height 6 ft 0 in / 182.88 cm           Weight 220 lbs 1 oz / 99.084676 kg           BSA 2.22 m2           BMI 29.8 kg/m2           Temperature 98.4 F / 36.89 C - Oral           Pulse 81           Respirations 14           Blood Pressure 149/76 Sitting, Left Arm           Pulse Oximetry 95%, roomair            REVIEW      Results Reviewed      P[facility] Results Reviewed?:  Yes Prev Lab Results, Yes Prev Radiology Results, Yes     Previous [facility] Records      Lab Results      I personally reviewed my last office note.            Assessment      Notes      New Medications      * Losartan Potassium (Losartan*) 50 MG TABLET: 50 MG PO BID #60      Renewed Medications      * Fluticasone/Vilanterol 200-25 Mcg Inh (Breo Ellipta 200-25 Mcg Inh) 1 EACH       BLST.W.DEV: 1 PUFF INH QDAY #1      Discontinued Medications      * Losartan Potassium (Cozaar) 25 MG TABLET: 50 MG PO BID 90 Days #360      IMPRESSION:      1.  Dyspnea, markedly improved.      2. Cough, improved.      3.  Wheeze, resolved.      4. Emphysema.  No obstruction on PFTs.  Alpha 1 testing unremarkable. Doing well    on LABA ICS therapy with minimal symptoms at this time.      5.  Obstructive sleep apnea, well-controlled on CPAP.      6.  Tobacco abuse with cigarettes in remission and in lung cancer screening.            PLAN:      1.  The patient is doing well with the current therapies.      2.  Continue Breo 200/25 one puff daily.      3.  Continue LDCT screening annually.      4.  Continue CPAP nightly at the settings of 9 cm of water pressure.      5.  Up-to-date with flu, Prevnar and Pneumovax.      6. Follow up in one year with myself or AMERICA Harvey.            Patient Education      Patient Education Provided:  COPD                 Disclaimer: Converted document may not contain table  formatting or lab diagrams. Please see [facility] [facility] for the authenticated document.

## 2021-05-28 NOTE — PROGRESS NOTES
Patient: FRED MCCORMACK     Acct: [id number]     Report: #[id number]  UNIT #: [id number]     : 1950    Encounter Date:2018  PRIMARY CARE: BLANKA MARTINEZ  ***Signed***  --------------------------------------------------------------------------------------------------------------------  Chief Complaint      Encounter Date      Sep 5, 2018            Primary Care Provider      BLANKA MARTINEZ            Referring Provider      BLANKA MARTINEZ            Patient Complaint      Patient is complaining of      f/u ct results            VITALS      Height 6 ft 0 in / 182.88 cm      Weight 211 lbs 9 oz / 95.531221 kg      BSA 2.23 m2      BMI 28.7 kg/m2      Temperature 98.5 F / 36.94 C - Oral      Pulse 55      Respirations 12      Blood Pressure 148/81 Sitting, Left Arm      Pulse Oximetry 98%, roomair            HPI      The patient is a very pleasant 67 year old  male former cigarette     smoker with emphysema here for follow up. He also has obstructive sleep apnea.             Since his last office visit I have him on Bevespi and stopped all his other     medications from Dr. Burroughs. He is still wearing his CPAP at night. He feels     mostly unchanged since his last office visit. He still gets short of breath     walking 100 feet, worse with exertion, moderate in severity and relieved with     rest. He has a cough that is slightly better and wheeze that is slightly better     since starting Bevespi. His cough is dry with no sputum production or     hemoptysis. He has no nausea and vomiting, fevers or chills, headaches or chest     pain. He denies any itchy, scratchy throat and watery eyes or nasal congestion.     He denies any morning headaches or excessive daytime sleepiness and he is not on    oxygen. He did smoke a pack of cigarettes a day for 55 years and quit smoking in    2016. Since his last office visit I did alpha-1 antitrypsin testing with normal     genotype MM.  Pulmonary function tests showed no airflow obstruction but did show    airtrapping with diffusion capacity of 50%. Low dose CT scan was done showing no    suspicious nodules but did show emphysema. He is interested in switching up     inhalers to see if something else works. He does have some musculoskeletal back     pain since twisting his back 2 days ago. It is in his left back, sharp,     intermittent, 3/10 in severity, constant, worse with twisting and relieved with     Excedrin.             I have personally reviewed the Review of Systems, past family, social, surgical     and medical histories and I agree with the findings.            ROS      Constitutional:  Denies: Fatigue, Fever, Weight gain, Weight loss, Chills,     Insomnia, Other      Respiratory/Breathing:  Complains of: Shortness of air; Denies: Wheezing, Cough,    Hemoptysis, Pleuritic pain, Other      Endocrine:  Denies: Polydipsia, Polyuria, Heat/cold intolerance, Diabetes, Other      Eyes:  Denies: Blurred vision, Vision Changes, Other      Ears, nose, mouth, throat:  Denies: Mouth lesions, Thrush, Throat pain,     Hoarseness, Allergies/Hay Fever, Post Nasal Drip, Headaches, Recent Head Injury,    Nose Bleeding, Neck Stiffness, Thyroid Mass, Hearing Loss, Ear Fullness, Dry Mo    uth, Nasal or Sinus Pain, Dry Lips, Nasal discharge, Nasal congestion, Other      Cardiovascular:  Denies: Palpitations, Syncope, Claudication, Chest Pain, Wake     up Gasping for air, Leg Swelling, Irregular Heart Rate, Cyanosis, Dyspnea on Ex    ertion, Other      Gastrointestinal:  Denies: Nausea, Constipation, Diarrhea, Abdominal pain,     Vomiting, Difficulty Swallowing, Reflux/Heartburn, Dysphagia, Jaundice,     Bloating, Melena, Bloody stools, Other      Musculoskeletal:  Denies: Joint Pain, Joint Stiffness, Joint Swelling, Myalgias,    Other      Hematologic/lymphatic:  DENIES: Lymphadenopathy, Bruising, Bleeding tendencies,     Other      Neurological:  Denies:  Headache, Numbness, Weakness, Seizures, Other      Psychiatric:  Denies: Anxiety, Appropriate Effect, Depression, Other      Sleep:  No: Excessive daytime sleep, Morning Headache?, Snoring, Insomnia?, Stop    breathing at sleep?, Other      Integumentary:  Denies: Rash, Dry skin, Skin Warm to Touch, Other      Immunologic/Allergic:  Denies: Latex allergy, Seasonal allergies, Asthma,     Urticaria, Eczema, Other      Immunization status:  No: Up to date            FAMILY/SOCIAL/MEDICAL HX      Surgical History:  Yes: Appendectomy (45-50 YRS AGO), Orthopedic Surgery (LEFT     MIDDLE TRIGGER FINGER SX), Spinal Surgery (SURGERY ON C5-C6 IN 1996 AND 1999);     No: Abdominal Surgery, Bladder Surgery, Bowel Surgery, CABG, Cholecystectomy,     Head Surgery, Oral Surgery, Vascular Surgery      Heart - Family Hx:  Mother, Father, Grandparent      Is Father Still Living?:  No      Is Mother Still Living?:  No       Family History:  Yes      Social History:  No Tobacco Use, No Alcohol Use, No Recreational Drug use      Smoking status:  Former smoker (1 ppd x 50 y quit 8/2016)      Smoking history:  25-50 pack years      Anticoagulation Therapy:  No      Antibiotic Prophylaxis:  No      Medical History:  Yes: Arthritis (GENERALIZED), Chronic Bronchitis/COPD,     Depression, Anxiety, Bipolar Disorder, Hemorrhoids/Rectal Prob (ACID REFLUX),     High Blood Pressure (NORMALLY CONTROLLED WITH MEDS), Reflux Disease, Shortness     Of Breath (PNEUMONIA OCT 2014); No: Asthma, Blood Disease, Chemotherapy/Cancer,     Congestive Heart Failu, Deafness or Ringing Ears, Diabetes, Seizures, Heart     Attack, Miscellaneous Medical/oth      Psychiatric History      anxiety/ depression/ bi polar            PREVENTION      Hx Influenza Vaccination:  Yes (FALL 2015)      Date Influenza Vaccine Given:  Oct 1, 2017      2 or More Falls Past Year?:  No      Fall Past Year with Injury?:  No      Hx Pneumococcal Vaccination:  Yes (NOV 2014)       Encouraged to follow-up with:  PCP regarding preventative exams.      Chart initiated by      ran flores/ ma            ALLERGIES/MEDICATIONS      Allergies:        Coded Allergies:             PENICILLINS (Verified  Allergy, Unknown, SWELLING, 9/5/18)      Uncoded Allergies:             Penicillin (Allergy, Mild, 10/13/07)      Medications    Last Reconciled on 9/5/18 11:14 by GARETT CRUZ MD      Methylprednisolone (Medrol Dosepak) 4 Mg Tab.ds.pk      4 MG PO ASDIR DOSE INSTRXN, #1 PACKAGE 0 Refills         Prov: GARETT CRUZ         9/5/18       Fluticasone/Vilanterol 200-25 Mcg Inh (Breo Ellipta 200-25 Mcg Inh) 1 Each     Blst.w.dev      1 PUFF INH QDAY, #1 INH 2 Refills         Prov: GARETT CRUZ         9/5/18       ClonazePAM (ClonazePAM) 0.5 Mg Tablet      0.5 MG PO BID, #60 TAB 0 Refills         Reported         7/11/18       amLODIPine (amLODIPine) 10 Mg Tablet      10 MG PO QDAY, #30 TAB 0 Refills         Reported         7/11/18       Furosemide* (Lasix*) 40 Mg Tablet      40 MG PO QDAY, #30 TAB 0 Refills         Reported         7/11/18       Pravastatin Sod (Pravastatin*) 40 Mg Tablet      40 MG PO QDAY, #30 TAB 0 Refills         Reported         7/11/18       Furosemide (Furosemide) 40 Mg Tablet      40 MG PO QDAY, #30 TAB         Prov: TONEY SANTIAGO         8/24/16       cloNIDine HCl (Catapres) 0.2 Mg Tab      0.2 MG PO QDAY, 0 Refills         Reported         8/9/16       Primidone (Mysoline*) 50 Mg Tab      50 MG PO TID         Reported         3/29/16       Losartan Potassium (Losartan*) 100 Mg Tablet      100 MG PO BID         Reported         3/29/16       oxyCODONE HCl/Acetaminophen (oxyCODONE HCl/Acetaminophen 5/325 MG) 1 Tab Tablet      1 TAB PO Q6HR PRN for PAIN, #90         Reported         3/29/16       Ranitidine Hcl (Ranitidine*) 150 Mg Tablet      150 MG PO BID, 0 Refills         Reported         3/10/15       MDI-Albuterol (Ventolin HFA*) 18 Gm Inhaler      2 PUFFS INH BID PRN  for SHORTNESS OF BREATH, #1 INH 0 Refills         Reported         10/11/14      Current Medications      Current Medications Reviewed 18            EXAM      Vital Signs Reviewed      Gen: WDWN, Alert, NAD.        HEENT:  PERRL, EOMI.  OP, nares clear, no sinus tenderness.      Chest:  Good aeration, clear to auscultation bilaterally, tympanic to percussion    bilaterally, no work of breathing noted. There is point tenderness around the     posterior ribs and paraspinal muscles.       CV:  RRR, no MGR, pulses 2+, equal.      Abd:  Soft, NT, ND, + BS, no HSM.      EXT:  No clubbing, no cyanosis, no edema.       Neuro:  A  Skin: No rashes or lesions.      Vtials      Vitals:             Height 6 ft 0 in / 182.88 cm           Weight 211 lbs 9 oz / 95.135225 kg           BSA 2.23 m2           BMI 28.7 kg/m2           Temperature 98.5 F / 36.94 C - Oral           Pulse 55           Respirations 12           Blood Pressure 148/81 Sitting, Left Arm           Pulse Oximetry 98%, roomair            REVIEW      Results Reviewed      PCCS Results Reviewed?:  Yes Prev Lab Results, Yes Prev Radiology Results, Yes     Previous Mecial Records      Lab Results      I personally reviewed the patient's alpha-1 antitrypsin level normal genotype     MM.      Radiographic Results               [facility]                PACS RADIOLOGY REPORT            Patient: FRED MCCORMACK   Acct: #[id number]   Report: #[id number]            UNIT #: [id number]    DOS: 18 1421      INSURANCE:MEDICARE PART A   LOCATION:CT     : 1950            PROVIDERS      ADMITTING:     ATTENDING: GARETT CRUZ      FAMILY:  BLANKA MARTINEZ [facility]   ORDERING:  GARETT CRUZ         OTHER:    DICTATING:  LAURO NG MD            REQ #:[id number]   [facility]      REASON FOR EXAM:  FORMER SMOKER      REASON FOR VISIT:  FORMER SMOKER            *******Signed******          PROCEDURE:   CT LOW DOSE CHEST SCREENING             COMPARISON:   [facility], CT, CHEST W/O CONTRAST, 2017,     11:23.             REASON FOR SCREENING:   Patient is 67 years of age, asymptomatic, and has a     smoking history of more       than 30 pack years.      SMOKING STATUS:   Former smoker.  Years since quittin                SCREENING VISIT:   Baseline.                   TECHNIQUE:   Axial unenhanced LDCT images from the apices through mid-kidney     were obtained.       Evaluation of solid organs and vascular structures is suboptimal due to the lack    of IV contrast.       Imaging was performed on a Siemens Sensation CT scanner.             RADIATION:   CT Dose Index Vol (CTDIvol):    3.05  mGy         Dose Length Product (DLP):    110  mGy-cm             DIAGNOSTIC QUALITY:   Satisfactory.               FINDINGS:         Emphysema.  Scattered areas of scarring.  No suspicious pulmonary nodule.  No     adenopathy in the       chest.             Coronary artery calcification.  Aneurysmal dilation ascending thoracic aorta up     to 4.5 cm is       unchanged.  No acute findings in the included upper abdomen.  No aggressive     appearing bone lesion.             CONCLUSION:   Emphysema.  No suspicious pulmonary nodule.             Other findings as above are not significantly changed from the prior.             Lung rads category 1-negative.  Per the ACR lung rads recommendations, suggest     patient continue       with yearly low-dose lung cancer screening.              LAURO NG MD             Electronically Signed and Approved By: LAURO NG MD on 2018 at 14:54                        Until signed, this is an unconfirmed preliminary report that may contain      errors and is subject to change.                                              LAUREN:      D:18 1454      PFT Results      [id number]  [id number]                                 [facility]  [facility] Information Management Services                            [place]  [phone]               __________________________________________________________________________             Patient Name:                   Attending Physician:      Jose Rios M.D.             Patient Visit # MR #            Admit Date  Disch Date     Location      [id number]    [id number]      08/02/2018                 [facility]             Date of Birth      1950      __________________________________________________________________________      821 - DIAGNOSTIC REPORT             PULMONARY FUNCTION TEST             6-MINUTE WALK TEST             DATE OF SERVICE:      08/02/2018             PROCEDURE:      The patient performed a 6-minute walk test that was appropriate for American      Thoracic Society criteria for a 6-minute walk test.  He used a rolling walker      during the test.             TEST RESULTS:        1. The patient walked a total of 1,020 feet during the test at 1.9 miles an           hour and reached a met target of 2.5.        2. There was no evidence of desaturation or submaximal exertion as he           started off at 96% on room air and finished the test at 94% on room air.        3. There was minimal change in his heart rate as it increased from 60 to 73.             OVERALL IMPRESSION:      Normal 6-minute walk test without evidence of hypoxemia.             To be electronically signed in [employer]      [id number] GARETT CRUZ M.D.             AM:vh      D:  08/06/2018 08:07      T:  08/06/2018 08:35      #[id number]             Until signed, this is an unconfirmed preliminary report that may contain      errors and is subject to change.                   08/06/18 0846  <Electronically signed by GARETT CRUZ MD>                     [id number]  [id number]                                 [facility]                           Health Information Management Services                            [place]  [phone]               __________________________________________________________________________             Patient Name:                   Attending Physician:      Jose Rios M.D.             Patient Visit # MR #            Admit Date  Disch Date     Location      [id number]    [id number]      08/02/2018                 [facility] -             Date of Birth      1950      __________________________________________________________________________      821 - DIAGNOSTIC REPORT             PULMONARY FUNCTION TEST             DATE OF SERVICE:      08/02/2018             SPIROMETRY:        1. No obstructive lung defect is noted.        2. No bronchodilator response is noted.             FLOW VOLUME LOOP:      Flow volume loop appears normal.             LUNG VOLUMES:      There is evidence of airtrapping.  No restrictive lung defect noted.             DIFFUSION CAPACITY:      Diffusion capacity is moderately reduced at 51% of predicted.             OVERALL IMPRESSION:        1.     No evidence of obstruction or restriction.        2.     No bronchodilator response.        3.     Airtrapping present.        4.     Diffusion capacity moderately reduced.             Please clinically correlate for pulmonary vascular disease.             To be electronically signed in [facility]      [id number] GARETT CRUZ M.D.             AM:[email or web address]      D:  08/06/2018 08:06      T:  08/06/2018 08:31      #[id number]             Until signed, this is an unconfirmed preliminary report that may contain      errors and is subject to change.                   08/06/18 0845  <Electronically signed by GARETT CRUZ MD>            Assessment      Notes      New Medications      * Fluticasone/Vilanterol 200-25 Mcg Inh (Breo Ellipta 200-25 Mcg Inh) 1 EACH       BLST.W.DEV: 1 PUFF INH QDAY #1      *  Methylprednisolone (Medrol Dosepak) 4 MG TAB.DS.PK: 4 MG PO ASDIR DOSE INSTRXN      #1         Instructions: Take dosepack as directed. Take all of first day's tablets the        first day.      Discontinued Medications      * Glycopyrrolate/Formoterol Fum (Bevespi Aerosphere Inhaler) 10.7 GM HFA.AER.AD:      2 PUFFS INH BID #1         Dx: COPD (chronic obstructive pulmonary disease) - J44.9      * Glycopyrrolate/Formoterol Fum (Bevespi Aerosphere Inhaler) 10.7 GM HFA.AER.AD:      2 PUFFS INH BID #1         Dx: COPD (chronic obstructive pulmonary disease) - J44.9      IMPRESSION:      1.  Dyspnea unchanged.       2. Cough improved.       3. Wheeze improved.       4. Emphysema. Pulmonary function tests show no evidence of chronic obstructive     pulmonary disease. Patient still has breathlessness.  Alpha-1 antitrypsin     testing was unremarkable. He is on LAMA/LABA therapy with persistent symptoms     and will make a transition to LABA/ICS therapy to see if this helps.       5. Obstructive sleep apnea well controlled on CPAP.       6. Tobacco abuse of cigarettes in remission and enrolled in lung cancer     screening.             PLAN:      1. Discontinue Bevespi and start Breo 200/25 1 puff daily. Inhaler education     provided today.       2. Continue annual low dose CT scan for lung cancer screening.       3. Continue CPAP as prescribed with CPAP pressure of 9 cm of water pressure.       4. Up to date with flu, Prevnar and Pneumovax.       5. Follow up with me in 3-4 months to reassess symptoms. If the patient is not     better by the next office visit we will discuss putting the patient on triple     inhaler therapy and consider pulmonary rehabilitation.            Patient Education      Patient Education Provided:  COPD, How to use an Inhaler      Other Education:  test results                 Disclaimer: Converted document may not contain table formatting or lab diagrams. Please see [employer] for the authenticated document.

## 2021-05-28 NOTE — PROGRESS NOTES
"Patient: FRED MCCORMACK     Acct: [id number]     Report: #[id number]  UNIT #: [id number]     : 1950    Encounter Date:2021  PRIMARY CARE: BLANKA MARTINEZ  ***Signed***  --------------------------------------------------------------------------------------------------------------------  Chief Complaint      Encounter Date      Mar 5, 2021            Primary Care Provider      BLANKA MARTINEZ            Referring Provider      BLANKA MARTINEZ            Patient Complaint      Patient is complaining of      PT here for F/U Needs a new CPAP, JIM, Emphysema, Dyspnea, COPD,            VITALS      Height 6 ft 0 in / 182.88 cm      Weight 222 lbs  / 100.635791 kg      BSA 2.23 m2      BMI 30.1 kg/m2      Temperature 98.6 F / 37 C - Temporal      Pulse 61      Respirations 14      Blood Pressure 159/83 Sitting, Left Arm      Pulse Oximetry 93%            HPI      The patient is a very pleasant 70 year old male who sees Dr. Kruger in clinic     with COPD and obstructive sleep apnea here today due to him having issues with     his CPAP machine at home. He states that he has been having issues for the past     several weeks where his machine will shut off in the middle of the night, he has    to \"hit it\" several times to get it to turn on.  He states his machine is about     six years old now. He tried to call his [employer] and they instructed him to     come to our office to try and get this sorted out.  He states he is compliant     with his CPAP therapy, wears it everynigth all night.  He does endorse some     daytime sleepiness and headaches that are chronic.  He does not feel his     headaches are related to his sleep. Overall, he feels that he sleep better with     his CPAP machine.  He states his breathing is at his baseline. He denies fever,     chills, chest pain or worsening shortness of breath.  He does state that he does    get short of breath with exertion.  He states that he is not " very active.  He     feels his wheezing is improved with his triple inhaler therapy.  He did have a     CT scan of the chest done on 01/14/2021 that showed no nodules, recommended to     repeat in 12 months. He states that he does not need any refills on his medicat    ions today.  He is able to perform his ADLs without difficulty.            Review of systems:  10/14 review of systems obtained and negative unless     otherwise stated in the HPI.      Copies To:   GARETT CRUZ      Constitutional:  Denies: Fatigue, Fever, Weight gain, Weight loss, Chills,     Insomnia, Other      Respiratory/Breathing:  Denies: Shortness of air, Wheezing, Cough, Hemoptysis,     Pleuritic pain, Other      Endocrine:  Denies: Polydipsia, Polyuria, Heat/cold intolerance, Diabetes, Other      Eyes:  Denies: Blurred vision, Vision Changes, Other      Ears, nose, mouth, throat:  Denies: Congestion, Dysphagia, Hearing Changes, Nose    Bleeding, Nasal Discharge, Throat pain, Tinnitus, Other      Cardiovascular:  Denies: Chest Pain, Exertional dyspnea, Peripheral Edema,     Palpitations, Syncope, Wake up Gasping for air, Orthopnea, Tachycardia, Other      Gastrointestinal:  Denies: Abdominal pain/cramping, Bloody stools, Constipation,    Diarrhea, Melena, Nausea, Vomiting, Other      Genitourinary:  Denies: Dysuria, Urinary frequency, Incontinence, Hematuria,     Urgency, Other      Musculoskeletal:  Denies: Joint Pain, Joint Stiffness, Joint Swelling, Myalgias,    Other      Hematologic/lymphatic:  DENIES: Lymphadenopathy, Bruising, Bleeding tendencies,     Other      Neurologic:  Denies: Headache, Numbness, Weakness, Seizures, Other      Psychiatric:  Denies: Anxiety, Appropriate Effect, Depression, Other      Sleep:  No: Excessive daytime sleep, Morning Headache?, Snoring, Insomnia?, Stop    breathing at sleep?, Other      Integumentary:  Denies: Rash, Dry skin, Skin Warm to Touch, Other            FAMILY/SOCIAL/MEDICAL HX       Surgical History:  Yes: Appendectomy (45-50 YRS AGO), Orthopedic Surgery (LEFT     MIDDLE TRIGGER FINGER SX rtk), Spinal Surgery (SURGERY ON C5-C6 IN 1996 AND     1999); No: Abdominal Surgery, Bladder Surgery, Bowel Surgery, CABG,     Cholecystectomy, Head Surgery, Oral Surgery, Vascular Surgery      Heart - Family Hx:  Mother, Father, Grandparent      Smoking status:  Former smoker (  Former smoker (1 ppd x 50 y quit 8/2016))      Anticoagulation Therapy:  No      Medical History:  Yes: Arthritis (GENERAlized, R KNEE), Chronic Bronchitis/COPD     (USES INHALERS), Depression, Anxiety, Bipolar Disorder, Hemorrhoids/Rectal Prob     (ACID REFLUX), High Blood Pressure ( CONTROLLED WITH MEDS), Reflux Disease,     Shortness Of Breath; No: Asthma, Blood Disease, Chemotherapy/Cancer, Congestive     Heart Failu, Deafness or Ringing Ears, Diabetes, Seizures, Sinus Trouble, Misce    llaneous Medical/oth      Psychiatric History      Anxiety, Depression            PREVENTION      Hx Influenza Vaccination:  Yes      Date Influenza Vaccine Given:  Feb 28, 2021      Influenza Vaccine Declined:  No      2 or More Falls in Past Year?:  No      Fall Past Year with Injury?:  No      Hx Pneumococcal Vaccination:  Yes      Encouraged to follow-up with:  PCP regarding preventative exams.      Chart initiated by      Lorena Adrian MA            ALLERGIES/MEDICATIONS      Allergies:        Coded Allergies:             PENICILLINS (Verified  Allergy, Severe, HIVES/SWELLING, 3/5/21)                  PT REPORTS HE HAS TAKEN KEFLEX W/O REACTION IN THE PAST      Medications    Last Reconciled on 6/5/20 15:38 by GARETT CRUZ MD MDI-Albuterol (Ventolin HFA) 18 Gm Hfa.aer.ad      2 PUFFS INH Q6H PRN for SHORTNESS OF BREATH for 30 Days, #1 MDI 5 Refills         Prov: GARETT CRUZ         2/15/21       Tiotropium Bromide (Spiriva Respimat 2.5 mcg/Puff) 4 Gm Mist.inhal      2 PUFFS INH RTQDAY, #3 MDI 3 Refills         Prov:  GARETT CRUZ         6/25/20       Fluticasone/Vilanterol 200-25 Mcg Inh (Breo Ellipta 200-25 Mcg Inh) 1 Each     Blst.w.dev      1 PUFF INH QDAY, #3 INH 3 Refills         Prov: GARETT CRUZ         6/5/20       oxyCODONE-Acetaminophen  Mg (Endocet  Mg) 1 Each Tablet      1 TAB PO Q3-4H PRN for JOINT PAIN, #45 TAB         Prov: PipocesarioSweta         11/23/19       Famotidine (Famotidine) 40 Mg Tablet      40 MG PO HS for 30 Days, #30 TAB         Reported         11/15/19       Metoprolol Succinate (Metoprolol Succinate) 50 Mg Tab.er.24h      150 MG PO QDAY, #60 TAB.SR.24H 0 Refills         Reported         11/15/19       Primidone (Mysoline*) 50 Mg Tablet      50 MG PO TID, TAB         Reported         10/29/19       buPROPion HCl XL (Wellbutrin XL) 300 Mg Tab.er.24h      300 MG PO QDAY for 30 Days, #30 TAB.ER.24H         Reported         10/29/19       Cholecalciferol (Vitamin D3) (Vitamin D3) 1,000 Unit Tab      2000 UNITS PO QDAY, #60 TAB 0 Refills         Reported         10/29/19       Cyanocobalamin (Vitamin B-12*) 500 Mcg Tab      500 MCG PO QDAY, #30 TAB         Reported         10/29/19       (Tumeric Curcumin)   No Conflict Check      3 TAB PO QAM         Reported         10/29/19       Albuterol/Ipratropium (Duoneb) 3 Ml Ampul.neb      3 ML INH Q4H PRN for SHORTNESS OF BREATH for 30 Days, #180 NEB 5 Refills         Prov: TIFFANY OVERTON PA-C         6/12/19       amLODIPine (amLODIPine) 5 Mg Tablet      5 MG PO QDAY, #30 TAB         Reported         6/11/19       Losartan Potassium (Losartan*) 50 Mg Tablet      50 MG PO BID, #60 TAB 0 Refills         Reported         12/19/18       clonazePAM (clonazePAM) 0.5 Mg Tablet      0.5 MG PO BID, #60 TAB 0 Refills         Reported         10/25/18       oxyCODONE-Acetaminophen  Mg (oxyCODONE-Acetaminophen  Mg) 1 Each     Tablet      1 TAB PO Q8HR PRN for PAIN, TAB 0 Refills         Reported         10/25/18      Current Medications       Current Medications Reviewed 3/5/21            EXAM      Vital Signs Reviewed.      General:  WDWN, Alert, NAD.      HEENT: PERRL, EOMI.  OP, nares clear, no sinus tenderness.      Neck: Supple, no JVD, no thyromegaly.      Lymph: No axillary, cervical, supraclavicular lymphadenopathy noted bilaterally.      Chest: Good aeration, clear to auscultation bilaterally, tympanic to percussion     bilaterally, no work of breathing noted.      CV: RRR, no MGR, pulses 2+, equal.        Abd: Soft, NT, ND, +BS, no HSM.      EXT: No clubbing, no cyanosis, no edema, no joint tenderness.        Neuro:  A  Skin: No rashes or lesions.      Vitals      Vitals:             Height 6 ft 0 in / 182.88 cm           Weight 222 lbs  / 100.200739 kg           BSA 2.23 m2           BMI 30.1 kg/m2           Temperature 98.6 F / 37 C - Temporal           Pulse 61           Respirations 14           Blood Pressure 159/83 Sitting, Left Arm           Pulse Oximetry 93%            REVIEW      Results Reviewed      [facility]  Yes Prev Lab Results, Yes Prev Radiology Results, Yes     [facility]      Lab Results      I have personally reviewed laboratory data, imaging as well as previous medical     records. I reviewed Dr. Cruz's last office visit note from 2020.      Radiographic Results               [facility] Diagnostic [facility]                PACS RADIOLOGY REPORT            Patient: FRED MCCORMACK   Acct: #[id number]   Report: #[id number]            UNIT #: [id number]    DOS: 21 1100      INSURANCE:MEDICARE PART A   LOCATION:[place]     : 1950            PROVIDERS      ADMITTING:     ATTENDING: GARETT CRUZ      FAMILY:  BLANKA MARTINEZ [facility]   ORDERING:  GARETT CRUZ         OTHER:    DICTATING:  Manuel Hairston MD            REQ #:[id number]   EXAM:[facility] - LOW DOSE CHEST CT SCREENING      REASON FOR EXAM:        REASON FOR VISIT:  FORMER SMOKER             *******Signed******         PROCEDURE:   CT LOW DOSE CHEST SCREENING             COMPARISON:   [facility], CT, CT LOW DOSE CHEST SCREENING,     2020, 13:17.             REASON FOR SCREENING:   Patient is 70 years of age, asymptomatic, and has a     smoking history of more       than 30 pack years.      SMOKING STATUS:   Former smoker.  Years since quittin YEARS                SCREENING VISIT:   Year 2.                   TECHNIQUE:   Axial unenhanced LDCT images from the apices through mid-kidney     were obtained.       Evaluation of solid organs and vascular structures is suboptimal due to the lack    of IV contrast.       Imaging was performed on a [email or web address] 128 CT scanner.             RADIATION:   CT Dose Index Vol (CTDIvol):    3.085  mGy         Dose Length Product (DLP):    139.2  mGy-cm             DIAGNOSTIC QUALITY:   Satisfactory             LUNG-RADS CLASSIFICATION:   1             FINDINGS:         Today's study is compared previous study performed on 2020.  Today's study     reveals no evidence       of mass or adenopathy in the base the neck or in the periclavicular soft tissues    or in the axillary       soft tissues.  No evidence of mass or adenopathy in the mediastinum are right or    left pulmonary       mariusz.  There is mild calcified atherosclerotic plaque in the left coronary     artery.  The ascending       thoracic aorta measures 4.6 x 4.6 cm in diameter consistent with a     moderate-severe dilatation and       is unchanged since the previous study performed on 2020.  The heart size      is normal.  No       evidence of pericardial effusion.  No evidence of hiatal hernia.  No acute     disease in the superior       aspect of the abdomen.  The patient is status post surgical fusion of multiple     levels in the       inferior cervical spine.  No evidence of pneumonia or pleural effusion or pne    umothorax or mass or       metastatic disease  in the right or left lungs.  There are bilateral diffuse     increased markings       consistent with chronic lung disease.  Mild bronchiectasis is seen in the     pulmonary mariusz       bilaterally.             LUNG-RADS CLASSIFICATION:  Lung-RADS 1 - Negative.  No nodules or definitely     benign nodules.        Recommendation: Continue annual screening with LDCT in 12 months.  <1%     probability of malignancy.        Estimated population prevalence is 90%.  Reference: ACR Lung-RADS (Lung CT     Screening reporting and       data system) Version 1.1 assessment categories release date 2019.               CONCLUSION:         1. No evidence of lung cancer.  Lung rads classification 1      2. Chronic lung disease and mild bronchiectasis.      3. 4.6 cm in diameter Abnormal dilatation of the ascending thoracic aorta     unchanged since previous       study performed on 1/14/2020.      4. Mild calcified atherosclerotic plaque left coronary artery              NICOLE SIEGEL MD             Electronically Signed and Approved By: NICOLE SIEGEL MD on 1/14/2021 at     11:57                               Until signed, this is an unconfirmed preliminary report that may contain      errors and is subject to change.                                              KATI:      D:01/14/21 1157            Assessment      Cancer screening - Z12.9            COPD (chronic obstructive pulmonary disease) - J44.9            Notes      New Diagnostics      * Low Dose Chest CT, 9 Months         Dx: Cancer screening - Z12.9      * PFT-Comp, PrePost,DLCO,BodyBox, Week         Dx: COPD (chronic obstructive pulmonary disease) - J44.9      New Referrals      * Pulmonary Rehab, Routine         Dx: COPD (chronic obstructive pulmonary disease) - J44.9      ASSESSMENT:       1. Emphysema on triple inhaler therapy.      2. Obstructive sleep apnea on home CPAP.      3. Chronic dyspnea at baseline.      4.  Tobacco abuse with cigarettes in  remission.      5. Stable aortic aneurysm.            PLAN:      1.  Continue Breo and Spiriva inhalers as prescribed.      2. Continue albuterol inhaler as needed.      3.  We will reach out to patient's [employer] to see if we can get him a new     CPAP machine.  His current settings are  AutoPAP 5-10 mm of water.   I advised     patient that he needs to continue to wear his CPAP everynight for greater than     four hours a night.      4. The patient will be due for a repeat low dose cancer CT scan in 01/2022.      Order placed today.      5. We will place a referral for the patient to go back to pulmonary rehab.  I     advised that it will be beneficent for the patient to increase his activity.      6. The patient has an already scheduled appointment at our clinic in 06/2021.  I    advised him to keep this appointment. He is to call our office with any     questions or go to the ED with any new concerning complaints.            Patient Education            Patient Education:        Chronic Obstructive Pulmonary Disease            Electronically signed by FITO CODY  03/15/2021 19:46  Electronically signed by Rashid Gomez  05/01/2021 19:08       Disclaimer: Converted document may not contain table formatting or lab diagrams. Please see [employer] for the authenticated document.

## 2021-06-03 NOTE — H&P
Patient: FRED RIOS     Acct: [id number]     Report: #[id number]  MR #:  [id number]     DOS: 2021 1417     : 1950  DICTATING: Nitin Martin  ***Signed***  --------------------------------------------------------------------------------------------------------------------  H&P Vascular Surgery office      DATE: 21            BLANKA MRATINEZ [facility]      Chief Complaint      Abdominal aorta aneurysm            History of Present Illness      Mr. Rios is a very pleasant 70 years old male with a known infrarenal     abdominal aorta aneurysm.  He says that he has known about it for about 4 to 5     years.  On a recent study his aneurysm was found to measure 3.6 cm and he comes     to see me for further evaluation from the vascular standpoint.  On further     questioning he had seen a neurologist because sometimes when he is walking he     tends to drift to the right side.  This doctor told him that he was suffering     mini strokes.  It is unclear what work-up was completed.  The patient was seen     physical therapy but that has now stopped.  No other complaints at this time.            Occasional smoker for over 50 years.  Not interested in smoking cessation.            Past Medical History      High blood pressure      Hyperlipidemia      COPD            Past Surgical History      Cervical spine surgery      Knee replacement      Appendectomy            Social History      Occasional smoker for many years, over 50.            Allergies      Coded Allergies:             PENICILLINS (Verified  Allergy, Severe, HIVES/SWELLING, 3/5/21)                  PT REPORTS HE HAS TAKEN KEFLEX W/O REACTION IN THE PAST            Home Medications      Albuterol 2 puffs every 6 hours as needed      Amlodipine 5 mg daily      Bupropion 300 mg daily      Vitamin D3      Clonazepam 0.5 mg twice daily      Vitamin B12 500 mcg daily      Famotidine 40 mg nightly      Breo Ellipta 1 puff daily      Losartan 50 mg  twice daily      Metoprolol 150 mg daily      Percocet as needed      Primidone 50 mg 3 times daily      Spiriva 2 puffs daily      Turmeric 3 tablets daily            Review of Systems      Noncontributory except for the history of present illness.            Exam      Blood pressure 170/84, respirations 16, pulse 65, temperature 97.6, saturation     98% on room air.      Exam      General: Alert, no acute distress.      Neck: Supple, no bruits      Heart: Regular rate      Lungs: Clear      Abdomen: Benign      Extremities: Symmetric      Pulses: +2 bilateral radials      Radiology Impressions      An aortic ultrasound dated 3/31/2021 has been personally reviewed.  An     infrarenal abdominal aorta aneurysm with a maximum diameter of 3.6 cm is     present.      A report for a CT scan of the abdomen and pelvis dated 1/23/2020 indicates the     presence of a 3.5 cm infrarenal abdominal aorta aneurysm.            Impression/Plan      Mr. Rios has a small infrarenal abdominal aorta aneurysm which appears fairly    stable based on his information and his available studies.  At this time I am     recommending for a follow-up aortic ultrasound in 1 year.  Regarding his drifti    ng and the fact that another provider told him that he was experiencing mini     strokes, I am recommending that we obtain a carotid ultrasound.  This will be     done in the near future.  Follow-up with me after the above.      Problems:        (1) Carotid stenosis      (2) AAA (abdominal aortic aneurysm) without rupture            Nitin Martin                  Apr 19, 2021 14:16      Electronically signed by Nitin Martin  04/19/2021 14:17     Disclaimer: Converted hospital document may not contain table formatting or lab diagrams. Please see [email or web address] for authenticated document.

## 2021-06-05 PROBLEM — E78.5 HYPERLIPIDEMIA: Status: ACTIVE | Noted: 2021-06-05

## 2021-06-05 PROBLEM — F32.A DEPRESSION: Status: ACTIVE | Noted: 2021-06-05

## 2021-06-05 PROBLEM — I10 ESSENTIAL HYPERTENSION: Status: ACTIVE | Noted: 2019-07-29

## 2021-06-05 PROBLEM — J44.9 CHRONIC OBSTRUCTIVE PULMONARY DISEASE: Status: ACTIVE | Noted: 2021-06-05

## 2021-06-05 PROBLEM — F41.9 ANXIETY DISORDER: Status: ACTIVE | Noted: 2017-09-21

## 2021-06-05 PROBLEM — N18.30 CKD (CHRONIC KIDNEY DISEASE) STAGE 3, GFR 30-59 ML/MIN: Status: ACTIVE | Noted: 2018-02-02

## 2021-06-05 PROBLEM — M19.90 OSTEOARTHRITIS: Status: ACTIVE | Noted: 2019-07-29

## 2021-06-05 PROBLEM — F32.9 MAJOR DEPRESSIVE DISORDER: Status: ACTIVE | Noted: 2019-07-29

## 2021-06-05 RX ORDER — LOSARTAN POTASSIUM 50 MG/1
1 TABLET ORAL 2 TIMES DAILY
COMMUNITY
Start: 2020-12-16 | End: 2021-08-02

## 2021-06-05 RX ORDER — PANTOPRAZOLE SODIUM 40 MG/1
1 TABLET, DELAYED RELEASE ORAL DAILY
COMMUNITY
Start: 2021-02-15 | End: 2021-10-25

## 2021-06-05 RX ORDER — METOPROLOL TARTRATE 50 MG/1
1 TABLET, FILM COATED ORAL DAILY
COMMUNITY
End: 2021-06-07 | Stop reason: SDUPTHER

## 2021-06-05 RX ORDER — ALBUTEROL SULFATE 90 UG/1
1-2 AEROSOL, METERED RESPIRATORY (INHALATION) 4 TIMES DAILY
COMMUNITY
End: 2021-06-09 | Stop reason: SDUPTHER

## 2021-06-05 RX ORDER — OXYCODONE AND ACETAMINOPHEN 10; 325 MG/1; MG/1
1 TABLET ORAL EVERY 4 HOURS PRN
COMMUNITY
End: 2021-06-09

## 2021-06-05 RX ORDER — ATORVASTATIN CALCIUM 10 MG/1
1 TABLET, FILM COATED ORAL DAILY
COMMUNITY
Start: 2021-04-15 | End: 2021-07-12

## 2021-06-07 ENCOUNTER — OFFICE VISIT (OUTPATIENT)
Dept: FAMILY MEDICINE CLINIC | Facility: CLINIC | Age: 71
End: 2021-06-07

## 2021-06-07 VITALS
OXYGEN SATURATION: 97 % | SYSTOLIC BLOOD PRESSURE: 130 MMHG | RESPIRATION RATE: 18 BRPM | WEIGHT: 222 LBS | TEMPERATURE: 97.3 F | BODY MASS INDEX: 30.07 KG/M2 | HEART RATE: 58 BPM | DIASTOLIC BLOOD PRESSURE: 84 MMHG | HEIGHT: 72 IN

## 2021-06-07 DIAGNOSIS — Z00.00 MEDICARE ANNUAL WELLNESS VISIT, SUBSEQUENT: Primary | ICD-10-CM

## 2021-06-07 PROCEDURE — G0439 PPPS, SUBSEQ VISIT: HCPCS | Performed by: NURSE PRACTITIONER

## 2021-06-07 RX ORDER — METOPROLOL SUCCINATE 50 MG/1
TABLET, EXTENDED RELEASE ORAL
COMMUNITY
End: 2021-08-05

## 2021-06-07 RX ORDER — AMLODIPINE BESYLATE 5 MG/1
TABLET ORAL
COMMUNITY
End: 2021-08-05 | Stop reason: SDUPTHER

## 2021-06-07 RX ORDER — CLONAZEPAM 1 MG/1
TABLET ORAL
COMMUNITY
End: 2021-07-12

## 2021-06-07 RX ORDER — ALBUTEROL SULFATE 90 UG/1
AEROSOL, METERED RESPIRATORY (INHALATION)
COMMUNITY
End: 2021-06-09 | Stop reason: SDUPTHER

## 2021-06-07 RX ORDER — PRIMIDONE 50 MG/1
TABLET ORAL
COMMUNITY
End: 2021-12-01 | Stop reason: SDUPTHER

## 2021-06-07 RX ORDER — BUPROPION HYDROCHLORIDE 450 MG/1
TABLET, FILM COATED, EXTENDED RELEASE ORAL
COMMUNITY
End: 2021-08-02

## 2021-06-07 RX ORDER — HYDROCODONE BITARTRATE AND ACETAMINOPHEN 10; 325 MG/1; MG/1
TABLET ORAL
COMMUNITY
End: 2021-08-05

## 2021-06-07 RX ORDER — METOPROLOL SUCCINATE 100 MG/1
TABLET, EXTENDED RELEASE ORAL
COMMUNITY
End: 2021-08-05 | Stop reason: SDUPTHER

## 2021-06-07 NOTE — PROGRESS NOTES
The ABCs of the Annual Wellness Visit  Subsequent Medicare Wellness Visit    Chief Complaint   Patient presents with   • MEDICARE ANNUAL WELLNESS       Subjective   History of Present Illness:  Jose Rios is a 70 y.o. male who presents for a Subsequent Medicare Wellness Visit.    HEALTH RISK ASSESSMENT    Recent Hospitalizations:  No hospitalization(s) within the last year.    Current Medical Providers:  Patient Care Team:  Jagdeep Bonner APRN as PCP - General (Nurse Practitioner)  Toshia Kessler RN as Ambulatory  (Population Health)    Smoking Status:  Social History     Tobacco Use   Smoking Status Former Smoker   • Packs/day: 1.00   • Years: 50.00   • Pack years: 50.00   • Types: Cigarettes   • Quit date: 2016   • Years since quittin.8   Smokeless Tobacco Never Used       Alcohol Consumption:  Social History     Substance and Sexual Activity   Alcohol Use Not Currently       Depression Screen:   PHQ-2/PHQ-9 Depression Screening 2021   Little interest or pleasure in doing things 3   Feeling down, depressed, or hopeless 3   Trouble falling or staying asleep, or sleeping too much 1   Feeling tired or having little energy 3   Poor appetite or overeating 1   Feeling bad about yourself - or that you are a failure or have let yourself or your family down 0   Trouble concentrating on things, such as reading the newspaper or watching television 1   Moving or speaking so slowly that other people could have noticed. Or the opposite - being so fidgety or restless that you have been moving around a lot more than usual 1   Thoughts that you would be better off dead, or of hurting yourself in some way 0   Total Score 13   If you checked off any problems, how difficult have these problems made it for you to do your work, take care of things at home, or get along with other people? Very difficult       Fall Risk Screen:  STEADI Fall Risk Assessment was completed, and patient is at HIGH risk for falls.  Assessment completed on:6/7/2021    Health Habits and Functional and Cognitive Screening:  Functional & Cognitive Status 6/7/2021   Do you have difficulty preparing food and eating? No   Do you have difficulty bathing yourself, getting dressed or grooming yourself? Yes   Do you have difficulty using the toilet? No   Do you have difficulty moving around from place to place? No   Do you have trouble with steps or getting out of a bed or a chair? Yes   Current Diet Unhealthy Diet   Dental Exam Up to date   Eye Exam Not up to date   Exercise (times per week) 0 times per week   Current Exercise Activities Include None   Do you need help using the phone?  No   Are you deaf or do you have serious difficulty hearing?  Yes   Do you need help with transportation? Yes   Do you need help shopping? No   Do you need help preparing meals?  No   Do you need help with housework?  Yes   Do you need help with laundry? Yes   Do you need help taking your medications? No   Do you need help managing money? No   Do you ever drive or ride in a car without wearing a seat belt? No   Do you have difficulty concentrating, remembering or making decisions? Yes         Does the patient have evidence of cognitive impairment? No, was worse memory but feels like has gotten better of late.    Asprin use counseling:encourage use but thinks blood already thin.    Age-appropriate Screening Schedule:  Refer to the list below for future screening recommendations based on patient's age, sex and/or medical conditions. Orders for these recommended tests are listed in the plan section. The patient has been provided with a written plan.    Health Maintenance   Topic Date Due   • TDAP/TD VACCINES (1 - Tdap) Never done   • ZOSTER VACCINE (1 of 2) Never done   • INFLUENZA VACCINE  08/01/2021   • LIPID PANEL  04/06/2022          The following portions of the patient's history were reviewed and updated as appropriate: allergies, current medications, past family  history, past medical history, past social history, past surgical history and problem list.    Outpatient Medications Prior to Visit   Medication Sig Dispense Refill   • albuterol sulfate  (90 Base) MCG/ACT inhaler Inhale 1-2 puffs 4 (Four) Times a Day.     • albuterol sulfate  (90 Base) MCG/ACT inhaler albuterol sulfate HFA 90 mcg/actuation aerosol inhaler     • amLODIPine (NORVASC) 5 MG tablet amlodipine 5 mg tablet   TAKE TWO TABLETS BY MOUTH EVERY DAY     • atorvastatin (LIPITOR) 10 MG tablet Take 1 tablet by mouth Daily.     • buPROPion XL (FORFIVO XL) 450 MG 24 hr tablet bupropion HCl  mg 24 hr tablet, extended release   TAKE ONE TABLET BY MOUTH EVERY DAY SWALLOW WHOLE TABLET DO NOT CRUSH     • clonazePAM (KlonoPIN) 1 MG tablet clonazepam 1 mg tablet   TAKE 1 TABLET (1 MG) BY ORAL ROUTE 2 TIMES PER DAY FOR 30 DAYS     • Diclofenac Sodium (VOLTAREN) 1 % gel gel diclofenac 1 % topical gel   APPLY 2 GRAMS TO THE AFFECTED AREA(S) FOUR TIMES A DAY     • Fluticasone Furoate-Vilanterol (Breo Ellipta) 200-25 MCG/INH inhaler Breo Ellipta 200 mcg-25 mcg/dose powder for inhalation   INHALE 1 PUFFS BY MOUTH EVERY DAY     • HYDROcodone-acetaminophen (NORCO)  MG per tablet hydrocodone 10 mg-acetaminophen 325 mg tablet   TAKE ONE TABLET BY MOUTH EVERY 6 TO 8 HOURS IF NEEDED     • losartan (COZAAR) 50 MG tablet Take 1 tablet by mouth 2 (two) times a day.     • metoprolol succinate XL (TOPROL-XL) 100 MG 24 hr tablet metoprolol succinate  mg tablet,extended release 24 hr   TAKE ONE TABLET BY MOUTH EVERY DAY     • metoprolol succinate XL (TOPROL-XL) 50 MG 24 hr tablet metoprolol succinate ER 50 mg tablet,extended release 24 hr     • oxyCODONE-acetaminophen (Percocet)  MG per tablet Take 1 tablet by mouth Every 4 (Four) Hours As Needed.     • pantoprazole (Protonix) 40 MG EC tablet Take 1 tablet by mouth Daily.     • primidone (MYSOLINE) 50 MG tablet primidone 50 mg tablet   TAKE 1 TABLET BY  "ORAL ROUTE 3 TIMES A DAY     • tiotropium (Spiriva HandiHaler) 18 MCG per inhalation capsule Place 1 capsule into inhaler and inhale Daily.     • BUPROPION HCL ER, XL, PO Take 1 tablet by mouth Daily.     • metoprolol tartrate (LOPRESSOR) 50 MG tablet Take 1 tablet by mouth Daily.       No facility-administered medications prior to visit.       Patient Active Problem List   Diagnosis   • Anemia   • Anxiety disorder   • Chronic obstructive pulmonary disease (CMS/[facility])   • CKD (chronic kidney disease) stage 3, GFR 30-59 ml/min (CMS/[facility])   • Essential hypertension   • Hyperlipidemia   • Hyponatremia   • Major depressive disorder   • MVP (mitral valve prolapse)   • JIM (obstructive sleep apnea)   • Osteoarthritis   • Depression       Advanced Care Planning:  ACP discussion was declined by the patient. Patient has an advance directive (not in EMR), copy requested. Patient does not have an advance directive, declines further assistance.    Review of Systems    Compared to one year ago, the patient feels his physical health is worse.  COPD  Compared to one year ago, the patient feels his mental health is the same.    Reviewed chart for potential of high risk medication in the elderly: yes  Reviewed chart for potential of harmful drug interactions in the elderly:yes    Objective         Vitals:    06/07/21 1536   BP: 130/84   BP Location: Right arm   Patient Position: Sitting   Cuff Size: Adult   Pulse: 58   Resp: 18   Temp: 97.3 °F (36.3 °C)   TempSrc: Infrared   SpO2: 97%   Weight: 101 kg (222 lb)   Height: 182.9 cm (72\")   PainSc:   6       Body mass index is 30.11 kg/m².  Discussed the patient's BMI with him. The BMI is above average; BMI management plan is completed.    Physical Exam    Lab Results   Component Value Date     (H) 04/06/2021    CHLPL 213 (H) 04/06/2021    TRIG 216 (H) 04/06/2021    HDL 30 (L) 04/06/2021     (H) 04/06/2021    VLDL 43 (H) 04/06/2021    HGBA1C 5.4 04/06/2021    "     Assessment/Plan   Medicare Risks and Personalized Health Plan  CMS Preventative Services Quick Reference  Dementia/Memory   Fall Risk  Will continue to keep cane with him and states memory better.  Thinks from fall and hitting head.  May have had a TIA per Neurology review    The above risks/problems have been discussed with the patient.  Pertinent information has been shared with the patient in the After Visit Summary.  Follow up plans and orders are seen below in the Assessment/Plan Section.    There are no diagnoses linked to this encounter.  Follow Up:  No follow-ups on file.     An After Visit Summary and PPPS were given to the patient.

## 2021-06-09 ENCOUNTER — OFFICE VISIT (OUTPATIENT)
Dept: PULMONOLOGY | Facility: CLINIC | Age: 71
End: 2021-06-09

## 2021-06-09 VITALS
HEART RATE: 51 BPM | WEIGHT: 223 LBS | HEIGHT: 60 IN | SYSTOLIC BLOOD PRESSURE: 149 MMHG | RESPIRATION RATE: 15 BRPM | BODY MASS INDEX: 43.78 KG/M2 | OXYGEN SATURATION: 97 % | DIASTOLIC BLOOD PRESSURE: 76 MMHG | TEMPERATURE: 97.5 F

## 2021-06-09 DIAGNOSIS — I71.20 THORACIC AORTIC ANEURYSM WITHOUT RUPTURE (HCC): ICD-10-CM

## 2021-06-09 DIAGNOSIS — J43.2 CENTRILOBULAR EMPHYSEMA (HCC): ICD-10-CM

## 2021-06-09 DIAGNOSIS — G47.33 OSA (OBSTRUCTIVE SLEEP APNEA): Primary | ICD-10-CM

## 2021-06-09 PROCEDURE — 99214 OFFICE O/P EST MOD 30 MIN: CPT | Performed by: NURSE PRACTITIONER

## 2021-06-09 RX ORDER — TIOTROPIUM BROMIDE INHALATION SPRAY 3.12 UG/1
2 SPRAY, METERED RESPIRATORY (INHALATION)
Qty: 1 EACH | Refills: 11 | Status: SHIPPED | OUTPATIENT
Start: 2021-06-09 | End: 2022-05-26

## 2021-06-09 RX ORDER — TIOTROPIUM BROMIDE INHALATION SPRAY 3.12 UG/1
SPRAY, METERED RESPIRATORY (INHALATION)
COMMUNITY
Start: 2021-03-12 | End: 2021-06-09 | Stop reason: SDUPTHER

## 2021-06-09 RX ORDER — ALBUTEROL SULFATE 90 UG/1
2 AEROSOL, METERED RESPIRATORY (INHALATION) EVERY 6 HOURS PRN
Qty: 18 G | Refills: 11 | Status: SHIPPED | OUTPATIENT
Start: 2021-06-09 | End: 2021-07-09

## 2021-06-09 RX ORDER — ALBUTEROL SULFATE 1.25 MG/3ML
1 SOLUTION RESPIRATORY (INHALATION) EVERY 6 HOURS PRN
COMMUNITY
End: 2022-05-26 | Stop reason: SDUPTHER

## 2021-06-09 NOTE — PROGRESS NOTES
CHIEF COMPLAINT    Primary Care Provider  Jagdeep Bonner APRN     Referring Provider  No ref. provider found    Patient Complaint  Follow-up (1 Year follow up ), Sleep Apnea, Emphysema, and COPD        Subjective              History of Presenting Illness  Patient is a 70-year-old  male, patient of Dr. Rodriguez who has a history of chronic obstructive pulmonary disease and obstructive sleep apnea who presents for follow-up visit today.  Last visit new CPAP machine was ordered and patient states he did receive this and is wearing a CPAP every night.  Patient currently denies any morning headaches and excessive daytime sleepiness.  Patient states that his breathing is at baseline.  Patient states at times he does get short of breath that is worse with exertion, moderate severity, and improved with rest.  Patient denies fever, chills, night sweats, swollen glands in the head and neck, hemoptysis, purulent sputum production, dysphagia, chest pain, chest tightness, abdominal pain, nausea, vomiting, and diarrhea.  Patient also denies any myalgias, changes in sense of taste and/or smell, sore throat, any other coronavirus flulike symptoms.  Patient denies any leg swelling, orthopnea, paroxysmal nocturnal dyspnea.  Patient states that he is taking Breo and Spiriva every day as prescribed and uses albuterol inhaler as needed.  Patient is needing refill on medications today.  Patient states is a form his activities of daily living without difficulty.      I have personally reviewed the review of systems, past family, social, medical and surgical histories; and agree with their findings.        Review of Systems   Constitutional: Negative for activity change, appetite change, chills, diaphoresis, fatigue, fever, unexpected weight gain and unexpected weight loss.        Negative for Insomnia   HENT: Negative for congestion (Nasal), mouth sores, nosebleeds, postnasal drip, sore throat, swollen glands and trouble  swallowing.         Negative for Thrush  Negative for Hoarseness  Negative for Allergies/Hay Fever  Negative for Recent Head injury  Negative for Ear Fullness  Negative for Nasal or Sinus pain  Negative for Dry lips  Negative for Nasal discharge   Eyes: Negative for blurred vision and visual disturbance.   Respiratory: Positive for shortness of breath. Negative for apnea, cough, chest tightness and wheezing.         Negative for Hemoptysis  Negative for Pleuritic pain   Cardiovascular: Negative for chest pain, palpitations and leg swelling.        Negative for Claudication  Negative for Cyanosis  Negative for Dyspnea on exertion   Gastrointestinal: Negative for abdominal pain, diarrhea, nausea, vomiting and GERD.   Musculoskeletal: Negative for joint swelling and myalgias.        Negative for Joint pain  Negative for Joint stiffness   Skin: Negative for color change, dry skin, pallor and rash.   Neurological: Negative for syncope, weakness and headache.   Hematological: Negative for adenopathy. Does not bruise/bleed easily.        Family History   Problem Relation Age of Onset   • Heart disease Mother    • Heart disease Father    • Heart disease Maternal Grandfather         Social History     Socioeconomic History   • Marital status:      Spouse name: Not on file   • Number of children: Not on file   • Years of education: Not on file   • Highest education level: Not on file   Tobacco Use   • Smoking status: Former Smoker     Packs/day: 1.00     Years: 50.00     Pack years: 50.00     Types: Cigarettes     Quit date: 2016     Years since quittin.8   • Smokeless tobacco: Never Used   Vaping Use   • Vaping Use: Never used   Substance and Sexual Activity   • Alcohol use: Not Currently   • Drug use: Never   • Sexual activity: Not Currently     Partners: Female        Past Medical History:   Diagnosis Date   • Acid reflux disease    • Anxiety    • Arthritis     GENERALIZED  R KNEE   • Bipolar disorder  (CMS/[facility])    • Chronic bronchitis (CMS/[facility])    • COPD (chronic obstructive pulmonary disease) (CMS/[facility])     USES INHALERS   • Depression    • Hemorrhoids     RECTAL PROB (ACID REFLUX)   • Hypertension     CONTROLLED WITH MEDS   • SOB (shortness of breath)         Immunization History   Administered Date(s) Administered   • COVID-19 (AVTAR) 05/27/2021   • Flu Vaccine Quad PF >18YRS 10/16/2017, 10/01/2018   • Influenza TIV (IM) 10/01/2019   • Influenza, Unspecified 02/28/2021   • Pneumococcal Conjugate 13-Valent (PCV13) 07/29/2019   • Pneumococcal Polysaccharide (PPSV23) 08/01/2014       Allergies   Allergen Reactions   • Penicillins Swelling          Current Outpatient Medications:   •  albuterol (ACCUNEB) 1.25 MG/3ML nebulizer solution, Take 1 ampule by nebulization Every 6 (Six) Hours As Needed., Disp: , Rfl:   •  albuterol sulfate  (90 Base) MCG/ACT inhaler, Inhale 2 puffs Every 6 (Six) Hours As Needed for Wheezing or Shortness of Air for up to 30 days., Disp: 18 g, Rfl: 11  •  amLODIPine (NORVASC) 5 MG tablet, amlodipine 5 mg tablet  TAKE TWO TABLETS BY MOUTH EVERY DAY, Disp: , Rfl:   •  atorvastatin (LIPITOR) 10 MG tablet, Take 1 tablet by mouth Daily., Disp: , Rfl:   •  buPROPion XL (FORFIVO XL) 450 MG 24 hr tablet, bupropion HCl  mg 24 hr tablet, extended release  TAKE ONE TABLET BY MOUTH EVERY DAY SWALLOW WHOLE TABLET DO NOT CRUSH, Disp: , Rfl:   •  clonazePAM (KlonoPIN) 1 MG tablet, clonazepam 1 mg tablet  TAKE 1 TABLET (1 MG) BY ORAL ROUTE 2 TIMES PER DAY FOR 30 DAYS, Disp: , Rfl:   •  Diclofenac Sodium (VOLTAREN) 1 % gel gel, diclofenac 1 % topical gel  APPLY 2 GRAMS TO THE AFFECTED AREA(S) FOUR TIMES A DAY, Disp: , Rfl:   •  Fluticasone Furoate-Vilanterol (Breo Ellipta) 200-25 MCG/INH inhaler, Inhale 1 puff Daily for 30 days. Rinse mouth out after each use., Disp: 1 each, Rfl: 11  •  HYDROcodone-acetaminophen (NORCO)  MG per tablet, hydrocodone 10 mg-acetaminophen 325 mg tablet  TAKE  "ONE TABLET BY MOUTH EVERY 6 TO 8 HOURS IF NEEDED, Disp: , Rfl:   •  losartan (COZAAR) 50 MG tablet, Take 1 tablet by mouth 2 (two) times a day., Disp: , Rfl:   •  metoprolol succinate XL (TOPROL-XL) 100 MG 24 hr tablet, metoprolol succinate  mg tablet,extended release 24 hr  TAKE ONE TABLET BY MOUTH EVERY DAY, Disp: , Rfl:   •  metoprolol succinate XL (TOPROL-XL) 50 MG 24 hr tablet, metoprolol succinate ER 50 mg tablet,extended release 24 hr, Disp: , Rfl:   •  pantoprazole (Protonix) 40 MG EC tablet, Take 1 tablet by mouth Daily., Disp: , Rfl:   •  primidone (MYSOLINE) 50 MG tablet, primidone 50 mg tablet  TAKE 1 TABLET BY ORAL ROUTE 3 TIMES A DAY, Disp: , Rfl:   •  tiotropium bromide monohydrate (Spiriva Respimat) 2.5 MCG/ACT aerosol solution inhaler, Inhale 2 puffs Daily for 30 days., Disp: 1 each, Rfl: 11     Objective     Vital Signs:   /65 (BP Location: Right arm, Patient Position: Sitting, Cuff Size: Large Adult)   Pulse 51   Temp 97.5 °F (36.4 °C) (Oral)   Resp 15   Ht (!) 6 cm (2.36\")   Wt 101 kg (223 lb)   SpO2 97% Comment: room air  BMI 78740.69 kg/m²     Physical Exam   Vital Signs Reviewed  WDWN, Alert, NAD.    HEENT:  PERRL, EOMI.  OP, nares clear, no sinus tenderness  Neck:  Supple, no JVD, no thyromegaly  Lymph: no axillary, cervical, supraclavicular lymphadenopathy noted bilaterally  Chest:  good aeration, clear to auscultation bilaterally, tympanic to percussion bilaterally, no work of breathing noted  CV: RRR, no MGR, pulses 2+, equal.  Abd:  Soft, NT, ND, + BS, no HSM  EXT:  no clubbing, no cyanosis, no edema, no joint tenderness  Neuro:  A&Ox3, CN grossly intact, no focal deficits.  Skin: No rashes or lesions noted.      Result Review :   I have personally reviewed AMERICA Rivera's last office visit note.  I also reviewed Dr. Kruger's last telehealth visit note.    Assessment and Plan      Impression:  1. Chronic dyspnea at baseline.       2. Emphysema.  Patient on triple " inhaler therapy.      3. Obstructive sleep apnea well controlled with CPAP.      4. Tobacco abuse of cigarettes in remission.  Patient already enrolled in lung cancer screening.  Next low-dose chest CT scan already ordered and scheduled for January 2022.      5. Stable aortic aneurysm.  6.  Hypertension.        Plan:  1.  Patient to continue Breo and Spiriva every day as prescribed and rinse his mouth out after each use.      2.   Patient to continue albuterol inhaler nebulizer treatments as needed.   3.  Patient's blood pressure elevated in the office today.  Patient states he still has to take his losartan dose when he gets home.  Patient advised to recheck his blood pressure after he takes medication if it still remains elevated to call his primary care provider or go to the ER.  Is also advised if experiences any new or worsening symptoms to go to the ER.  Blood pressure parameters discussed with patient in the office today.  Patient states he is scheduled to follow-up with his PCP next week.  4. Continue annual low dose lung cancer screening.  Patient is due for repeat low-dose chest CT scan in January 2022.    5.  He sees a cardiac thoracic surgeon in [place] that follows his aortic  aneurysm.       6.  Patient reports he is up-to-date with his flu, pneumonia, and Covid vaccines.  Patient advised receive the COVID-19 vaccine and continue to follow CDC recommendations such as social distancing wearing a mask and washing hands for at least 20 seconds.        7. Follow up with Dr. Kruger in 4-6 months, sooner if needed.        Follow Up   Return in about 4 months (around 10/9/2021), or if symptoms worsen or fail to improve, for Recheck.  Patient was given instructions and counseling regarding his condition or for health maintenance advice. Please see specific information pulled into the AVS if appropriate.

## 2021-06-09 NOTE — PATIENT INSTRUCTIONS
"CPAP and BPAP Information  CPAP and BPAP are methods of helping a person breathe with the use of air pressure. CPAP stands for \"continuous positive airway pressure.\" BPAP stands for \"bi-level positive airway pressure.\" In both methods, air is blown through your nose or mouth and into your air passages to help you breathe well.  CPAP and BPAP use different amounts of pressure to blow air. With CPAP, the amount of pressure stays the same while you breathe in and out. With BPAP, the amount of pressure is increased when you breathe in (inhale) so that you can take larger breaths. Your health care provider will recommend whether CPAP or BPAP would be more helpful for you.  Why are CPAP and BPAP treatments used?  CPAP or BPAP can be helpful if you have:  · Sleep apnea.  · Chronic obstructive pulmonary disease (COPD).  · Heart failure.  · Medical conditions that weaken the muscles of the chest including muscular dystrophy, or neurological diseases such as amyotrophic lateral sclerosis (ALS).  · Other problems that cause breathing to be weak, abnormal, or difficult.  CPAP is most commonly used for obstructive sleep apnea (JIM) to keep the airways from collapsing when the muscles relax during sleep.  How is CPAP or BPAP administered?  Both CPAP and BPAP are provided by a small machine with a flexible plastic tube that attaches to a plastic mask. You wear the mask. Air is blown through the mask into your nose or mouth. The amount of pressure that is used to blow the air can be adjusted on the machine. Your health care provider will determine the pressure setting that should be used based on your individual needs.  When should CPAP or BPAP be used?  In most cases, the mask only needs to be worn during sleep. Generally, the mask needs to be worn throughout the night and during any daytime naps. People with certain medical conditions may also need to wear the mask at other times when they are awake. Follow instructions from your " health care provider about when to use the machine.  What are some tips for using the mask?    · Because the mask needs to be snug, some people feel trapped or closed-in (claustrophobic) when first using the mask. If you feel this way, you may need to get used to the mask. One way to do this is by holding the mask loosely over your nose or mouth and then gradually applying the mask more snugly. You can also gradually increase the amount of time that you use the mask.  · Masks are available in various types and sizes. Some fit over your mouth and nose while others fit over just your nose. If your mask does not fit well, talk with your health care provider about getting a different one.  · If you are using a mask that fits over your nose and you tend to breathe through your mouth, a chin strap may be applied to help keep your mouth closed.  · The CPAP and BPAP machines have alarms that may sound if the mask comes off or develops a leak.  · If you have trouble with the mask, it is very important that you talk with your health care provider about finding a way to make the mask easier to tolerate. Do not stop using the mask. Stopping the use of the mask could have a negative impact on your health.  What are some tips for using the machine?  · Place your CPAP or BPAP machine on a secure table or stand near an electrical outlet.  · Know where the on/off switch is located on the machine.  · Follow instructions from your health care provider about how to set the pressure on your machine and when you should use it.  · Do not eat or drink while the CPAP or BPAP machine is on. Food or fluids could get pushed into your lungs by the pressure of the CPAP or BPAP.  · Do not smoke. Tobacco smoke residue can damage the machine.  · For home use, CPAP and BPAP machines can be rented or purchased through home health care companies. Many different brands of machines are available. Renting a machine before purchasing may help you find out  which particular machine works well for you.  · Keep the CPAP or BPAP machine and attachments clean. Ask your health care provider for specific instructions.  Get help right away if:  · You have redness or open areas around your nose or mouth where the mask fits.  · You have trouble using the CPAP or BPAP machine.  · You cannot tolerate wearing the CPAP or BPAP mask.  · You have pain, discomfort, and bloating in your abdomen.  Summary  · CPAP and BPAP are methods of helping a person breathe with the use of air pressure.  · Both CPAP and BPAP are provided by a small machine with a flexible plastic tube that attaches to a plastic mask.  · If you have trouble with the mask, it is very important that you talk with your health care provider about finding a way to make the mask easier to tolerate.  This information is not intended to replace advice given to you by your health care provider. Make sure you discuss any questions you have with your health care provider.  Document Revised: 04/08/2020 Document Reviewed: 11/06/2017  [employer] Patient Education © 2020 [employer]      Chronic Obstructive Pulmonary Disease  Chronic obstructive pulmonary disease (COPD) is a long-term (chronic) lung problem. When you have COPD, it is hard for air to get in and out of your lungs. Usually the condition gets worse over time, and your lungs will never return to normal. There are things you can do to keep yourself as healthy as possible.  · Your doctor may treat your condition with:  ? Medicines.  ? Oxygen.  ? Lung surgery.  · Your doctor may also recommend:  ? Rehabilitation. This includes steps to make your body work better. It may involve a team of specialists.  ? Quitting smoking, if you smoke.  ? Exercise and changes to your diet.  ? Comfort measures (palliative care).  Follow these instructions at home:  Medicines  · Take over-the-counter and prescription medicines only as told by your doctor.  · Talk to your doctor before taking  any cough or allergy medicines. You may need to avoid medicines that cause your lungs to be dry.  Lifestyle  · If you smoke, stop. Smoking makes the problem worse. If you need help quitting, ask your doctor.  · Avoid being around things that make your breathing worse. This may include smoke, chemicals, and fumes.  · Stay active, but remember to rest as well.  · Learn and use tips on how to relax.  · Make sure you get enough sleep. Most adults need at least 7 hours of sleep every night.  · Eat healthy foods. Eat smaller meals more often. Rest before meals.  Controlled breathing  Learn and use tips on how to control your breathing as told by your doctor. Try:  · Breathing in (inhaling) through your nose for 1 second. Then, pucker your lips and breath out (exhale) through your lips for 2 seconds.  · Putting one hand on your belly (abdomen). Breathe in slowly through your nose for 1 second. Your hand on your belly should move out. Pucker your lips and breathe out slowly through your lips. Your hand on your belly should move in as you breathe out.    Controlled coughing  Learn and use controlled coughing to clear mucus from your lungs. Follow these steps:  1. Lean your head a little forward.  2. Breathe in deeply.  3. Try to hold your breath for 3 seconds.  4. Keep your mouth slightly open while coughing 2 times.  5. Spit any mucus out into a tissue.  6. Rest and do the steps again 1 or 2 times as needed.  General instructions  · Make sure you get all the shots (vaccines) that your doctor recommends. Ask your doctor about a flu shot and a pneumonia shot.  · Use oxygen therapy and pulmonary rehabilitation if told by your doctor. If you need home oxygen therapy, ask your doctor if you should buy a tool to measure your oxygen level (oximeter).  · Make a COPD action plan with your doctor. This helps you to know what to do if you feel worse than usual.  · Manage any other conditions you have as told by your doctor.  · Avoid  going outside when it is very hot, cold, or humid.  · Avoid people who have a sickness you can catch (contagious).  · Keep all follow-up visits as told by your doctor. This is important.  Contact a doctor if:  · You cough up more mucus than usual.  · There is a change in the color or thickness of the mucus.  · It is harder to breathe than usual.  · Your breathing is faster than usual.  · You have trouble sleeping.  · You need to use your medicines more often than usual.  · You have trouble doing your normal activities such as getting dressed or walking around the house.  Get help right away if:  · You have shortness of breath while resting.  · You have shortness of breath that stops you from:  ? Being able to talk.  ? Doing normal activities.  · Your chest hurts for longer than 5 minutes.  · Your skin color is more blue than usual.  · Your pulse oximeter shows that you have low oxygen for longer than 5 minutes.  · You have a fever.  · You feel too tired to breathe normally.  Summary  · Chronic obstructive pulmonary disease (COPD) is a long-term lung problem.  · The way your lungs work will never return to normal. Usually the condition gets worse over time. There are things you can do to keep yourself as healthy as possible.  · Take over-the-counter and prescription medicines only as told by your doctor.  · If you smoke, stop. Smoking makes the problem worse.  This information is not intended to replace advice given to you by your health care provider. Make sure you discuss any questions you have with your health care provider.  Document Revised: 11/30/2018 Document Reviewed: 01/22/2018  [employer]vier Patient Education © 2021 [employer]

## 2021-06-24 ENCOUNTER — PATIENT OUTREACH (OUTPATIENT)
Dept: CASE MANAGEMENT | Facility: OTHER | Age: 71
End: 2021-06-24

## 2021-06-24 DIAGNOSIS — J44.9 CHRONIC OBSTRUCTIVE PULMONARY DISEASE, UNSPECIFIED COPD TYPE (HCC): Primary | ICD-10-CM

## 2021-06-24 NOTE — OUTREACH NOTE
Ambulatory Case Management Note    Attempted to reach patient for CCM monthly outreach, no answer at this time.     Toshia Kessler RN  Ambulatory Case Management    6/24/2021, 15:39 EDT

## 2021-07-07 ENCOUNTER — PATIENT OUTREACH (OUTPATIENT)
Dept: CASE MANAGEMENT | Facility: OTHER | Age: 71
End: 2021-07-07

## 2021-07-07 DIAGNOSIS — J44.9 CHRONIC OBSTRUCTIVE PULMONARY DISEASE, UNSPECIFIED COPD TYPE (HCC): Primary | ICD-10-CM

## 2021-07-07 DIAGNOSIS — Z12.11 SCREENING FOR COLON CANCER: Primary | ICD-10-CM

## 2021-07-07 DIAGNOSIS — I10 HYPERTENSION, UNSPECIFIED TYPE: ICD-10-CM

## 2021-07-07 PROCEDURE — 99490 CHRNC CARE MGMT STAFF 1ST 20: CPT | Performed by: NURSE PRACTITIONER

## 2021-07-07 NOTE — OUTREACH NOTE
"Ambulatory Case Management Note    CCM Interim Update    After reviewing the chart, I called the patient for his monthly CCM phone call. Patient states that his COPD has been \"acting up lately\", but he has been using his C-PAP machine. Patient aware of S/S to report for his breathing.   When I asked patient how his BP was doing, he stated that it has been high, it has been running 140's-150's. Told patient to keep a log and I will call him back in 2 weeks to get his readings and then I can get them to PCP to see if she wants to make any changes.   Patient states that his anxiety has been doing well, but only as long as he takes his Clonazepam.   Patient reports chronic back pain, however [facility], and takes Percocet 10 mg tabs PRN and has Voltaren gel.  I did SDOH, see below. Looked at care gaps and patient is in need of a Colon screen, he states that we tried to get him set up with one at one time and the hospital wouldn't do it because he owed them too much money. Will pend this order to PCP.        Care Plan: Hypertension   Updates made since 7/7/2021 12:00 AM      Problem: LIFESTYLE CHOICES       Goal: Healthy food choice       Task: Provide resources for diet management such as calorie counting and sodium reduction    Responsible User: Sushila Zayas, RN      Task: Provide community resources for healthy food options    Responsible User: Sushila Zayas, RN      Task: Discuss healthy food options and preferred restaurants    Responsible User: Sushila Zayas RN      Problem: SODIUM INTAKE       Goal: Patient will maintain management of sodium consumption       Task: Educate patient on daily sodium intake & how sodium affects the heart    Responsible User: Sushila Zayas, RN      Task: Instruct patient to eat less salt and watch fluids. No added salt or no more than 2 grams (2000mgs) of sodium or 2 liters of fluid in a 24-hour period, or follow provider's " specific recommendations.    Responsible User: Sushila Zayas RN      Task: Educate patient how to read a nutrition label pointing out servings and serving size    Responsible User: Sushila Zayas RN      Task: Educate patient on sodium alternatives    Responsible User: Sushila Zayas RN      Problem: BLOOD PRESSURE MONITORING       Goal: Establish Plan for Regular Lab Work       Task: Track patient LDL and HDL levels Completed 7/7/2021   Responsible User: Sushila Zayas RN      Task: Track patient serum potassium and serum creatinine levels Completed 7/7/2021   Responsible User: Sushila Zayas RN      Task: Review the patients last urine protein. Discuss need if patient has not had one. Completed 7/7/2021   Responsible User: Sushila Zayas RN      Task: Review patient's last ECHO or EKG. Discuss need if patient has not had one. Completed 7/7/2021   Responsible User: Sushila Zayas RN      Problem: MEDICATION ADHERENCE       Goal: Consistently take medications as prescribed       Task: Discuss barriers to medication adherence with patient    Responsible User: Sushila Zayas RN      Task: Educate patient on frequency and refill details of meds Completed 7/7/2021   Responsible User: Sushila Zayas RN      Task: Assist patients in setting up daily notes/alarms for meds. Consider pill box use    Responsible User: Sushila Zayas RN      Problem: PATIENT IS INACTIVE       Goal: Exercise at least 20 minutes per day       Task: Discuss barriers to activity with patient. Update SDOH.    Responsible User: Sushila Zayas RN      Task: Develop exercise plan with patient    Responsible User: Sushila Zayas RN      Task: Explore community resources including walking groups, assistance programs, and home videos.    Responsible User: Sushila Zayas RN      Problem: PATIENT IS HYPERTENSIVE       Goal: Remain at or Below Target Blood Pressure        Task: Establish a target blood pressure with the patient in conjunction with PCP    Responsible User: Sushila Zayas RN      Task: Discuss steps to manage BP with patient Completed 7/7/2021   Responsible User: Sushila Zayas RN      Task: Educate on disease process and complications associated with noncompliance    Responsible User: Sushila Zayas RN      Task: Educate on keeping a log Completed 7/7/2021   Responsible User: Sushila Zayas RN        General & Health Literacy Assessment    Questions/Answers      Most Recent Value   Assessment Completed With  Patient   Living Arrangement  Spouse   Type of Residence  Private Residence   Home Care Services  No   Communication Device  No   Bed or Wheelchair Confined  No   Difficulty Keeping Appointments  No   Restorationism or Spiritual Beliefs that Impact Treatment  No   Chronic Pain  Yes   Location of Chronic Pain  Back   Chronic Pain Timing  Intermittent   Limitation of Routine Activities Due to Chronic Pain  Moderate        Care Evaluation    Questions/Answers      Most Recent Value   Annual Wellness Visit:   Patient Has Completed [6/07/2021]   Care Gaps Addressed  Pneumonia Vaccine, Colon Cancer Screening   Pneumonia Vaccine Status  Up to Date   Pneumonia Vaccine Completion at [facility] or Other  [facility]   Pneumonia Vaccine Comments  Will ask PCP about this, patient has never had one   Care Gap Comments  P23 vaccine 8/01/2014 & P13 vaccine 7/29/2019   Medication Adherence  Other (see comment) [Patient states that his BP has been elevated, told him to check it daily and we will call him back to get his readings. ]   Healthy Lifestyle (Self-Efficacy)  self-monitors vital signs appropriately, correctly recognizes signs/symptoms of pulmonary distress, recognizes when to stop activity, recognizes when to contact medical assistance, self-reports important symptoms to medical professional        SDOH updated and reviewed with the patient during  this program:     Financial Resource Strain: Medium Risk   • Difficulty of Paying Living Expenses: Somewhat hard       Physical Activity: Inactive   • Days of Exercise per Week: 0 days   • Minutes of Exercise per Session: 0 min       Food Insecurity: Food Insecurity Present   • Worried About Running Out of Food in the Last Year: Sometimes true   • Ran Out of Food in the Last Year: Never true       Social Connections: Unknown   • Frequency of Communication with Friends and Family: Once a week   • Frequency of Social Gatherings with Friends and Family: Not on file   • Attends Religion Services: More than 4 times per year   • Active Member of Clubs or Organizations: No   • Attends Club or Organization Meetings: Never   • Marital Status:        Transportation Needs: No Transportation Needs   • Lack of Transportation (Medical): No   • Lack of Transportation (Non-Medical): No       Housing Stability: Low Risk    • Unable to Pay for Housing in the Last Year: No   • Number of Places Lived in the Last Year: 1   • Unstable Housing in the Last Year: No       Stress: Stress Concern Present   • Feeling of Stress : Very much          Sushila Zayas RN  Ambulatory Case Management    7/7/2021, 15:03 EDT

## 2021-07-12 DIAGNOSIS — F41.9 ANXIETY: Primary | ICD-10-CM

## 2021-07-12 RX ORDER — CLONAZEPAM 1 MG/1
TABLET ORAL
Qty: 60 TABLET | Refills: 0 | Status: SHIPPED | OUTPATIENT
Start: 2021-07-12 | End: 2021-08-13

## 2021-07-12 RX ORDER — ATORVASTATIN CALCIUM 10 MG/1
TABLET, FILM COATED ORAL
Qty: 90 TABLET | Refills: 1 | Status: SHIPPED | OUTPATIENT
Start: 2021-07-12 | End: 2021-12-01 | Stop reason: SDUPTHER

## 2021-07-14 ENCOUNTER — TELEPHONE (OUTPATIENT)
Dept: FAMILY MEDICINE CLINIC | Facility: CLINIC | Age: 71
End: 2021-07-14

## 2021-07-14 NOTE — TELEPHONE ENCOUNTER
Caller: Jose Rios    Relationship to patient: Self    Best call back number: [phone]    Patient is needing: PATIENT HAD A REFILL SENT TO THE PHARMACY FOR   clonazePAM (KlonoPIN) 1 MG tablet  PHARMACY IS SAYING THEY NEED AUTHORIZATION ON THE MEDICATION TO REFILL. PLEASE ADVISE.

## 2021-07-26 ENCOUNTER — PATIENT OUTREACH (OUTPATIENT)
Dept: CASE MANAGEMENT | Facility: OTHER | Age: 71
End: 2021-07-26

## 2021-07-26 DIAGNOSIS — J44.9 CHRONIC OBSTRUCTIVE PULMONARY DISEASE, UNSPECIFIED COPD TYPE (HCC): ICD-10-CM

## 2021-07-26 DIAGNOSIS — I10 ESSENTIAL HYPERTENSION: Primary | ICD-10-CM

## 2021-07-26 NOTE — PROGRESS NOTES
He is maxed out on most of his medication.  I have in the last note he is on Norvasc 5 mg bid.  Is he only down to once a day.  If so he can increase the amlodipine back to 2 times a day.  Continue his b/p checks

## 2021-07-26 NOTE — OUTREACH NOTE
Ambulatory Case Management Note    CCM Interim Update      There are no recently modified care plans to display for this patient.    Called patient back to see what his BP readings have been as they are as follows. He is taking Losartan 50 mg BID, Norvasc 5 mg Daily, and Toprol  mg daily. Will send to PCP to see if she would like to make any changes to his medication regimen.   AM Readings  140/76  150/80  150/75  155/82  147/53  160/80  154/82  149/85    PM Readings  137/77  160/77  140/73  153/78  148/75    Sushila Zayas RN  Ambulatory Case Management    7/26/2021, 15:15 EDT       Called patient to Verify the Norvasc dose, and he states that he is taking that BID, not daily.     Sushila Zayas RN  Ambulatory Case Management    7/27/2021, 11:20 EDT

## 2021-08-02 DIAGNOSIS — I10 ESSENTIAL HYPERTENSION: ICD-10-CM

## 2021-08-02 DIAGNOSIS — F32.A DEPRESSION, UNSPECIFIED DEPRESSION TYPE: Primary | ICD-10-CM

## 2021-08-02 RX ORDER — BUPROPION HYDROCHLORIDE 450 MG/1
TABLET, FILM COATED, EXTENDED RELEASE ORAL
Qty: 90 TABLET | Refills: 1 | Status: SHIPPED | OUTPATIENT
Start: 2021-08-02 | End: 2021-08-05

## 2021-08-02 RX ORDER — LOSARTAN POTASSIUM 50 MG/1
TABLET ORAL
Qty: 180 TABLET | Refills: 1 | Status: SHIPPED | OUTPATIENT
Start: 2021-08-02 | End: 2022-03-30 | Stop reason: SDUPTHER

## 2021-08-04 ENCOUNTER — TELEPHONE (OUTPATIENT)
Dept: FAMILY MEDICINE CLINIC | Facility: CLINIC | Age: 71
End: 2021-08-04

## 2021-08-04 NOTE — TELEPHONE ENCOUNTER
Hub staff attempted to follow warm transfer process and was unsuccessful     Caller: Jose Rios    Relationship to patient: Self    Best call back number: [phone]    Patient is needing: THE PATIENT HAS RETURNED A CALL TO [facility], DID NOT GO INTO FURTHER DETAIL.

## 2021-08-05 ENCOUNTER — OFFICE VISIT (OUTPATIENT)
Dept: FAMILY MEDICINE CLINIC | Facility: CLINIC | Age: 71
End: 2021-08-05

## 2021-08-05 VITALS
DIASTOLIC BLOOD PRESSURE: 65 MMHG | TEMPERATURE: 96.4 F | BODY MASS INDEX: 28.44 KG/M2 | WEIGHT: 210 LBS | OXYGEN SATURATION: 95 % | RESPIRATION RATE: 16 BRPM | SYSTOLIC BLOOD PRESSURE: 164 MMHG | HEIGHT: 72 IN | HEART RATE: 62 BPM

## 2021-08-05 DIAGNOSIS — R73.01 IMPAIRED FASTING GLUCOSE: ICD-10-CM

## 2021-08-05 DIAGNOSIS — R05.9 COUGH: ICD-10-CM

## 2021-08-05 DIAGNOSIS — I10 ESSENTIAL HYPERTENSION: ICD-10-CM

## 2021-08-05 DIAGNOSIS — F41.9 ANXIETY DISORDER, UNSPECIFIED TYPE: Primary | ICD-10-CM

## 2021-08-05 DIAGNOSIS — F33.1 MODERATE EPISODE OF RECURRENT MAJOR DEPRESSIVE DISORDER (HCC): ICD-10-CM

## 2021-08-05 DIAGNOSIS — D64.9 ANEMIA, UNSPECIFIED TYPE: ICD-10-CM

## 2021-08-05 DIAGNOSIS — E78.5 HYPERLIPIDEMIA, UNSPECIFIED HYPERLIPIDEMIA TYPE: ICD-10-CM

## 2021-08-05 PROBLEM — Z79.4 ENCOUNTER FOR LONG-TERM (CURRENT) USE OF INSULIN: Status: ACTIVE | Noted: 2018-10-30

## 2021-08-05 PROBLEM — M50.30 DDD (DEGENERATIVE DISC DISEASE), CERVICAL: Status: ACTIVE | Noted: 2017-02-07

## 2021-08-05 PROCEDURE — 99214 OFFICE O/P EST MOD 30 MIN: CPT | Performed by: NURSE PRACTITIONER

## 2021-08-05 RX ORDER — OXYCODONE AND ACETAMINOPHEN 10; 325 MG/1; MG/1
TABLET ORAL
COMMUNITY
Start: 2021-07-16

## 2021-08-05 RX ORDER — METOPROLOL SUCCINATE 200 MG/1
200 TABLET, EXTENDED RELEASE ORAL DAILY
Qty: 90 TABLET | Refills: 1 | Status: SHIPPED | OUTPATIENT
Start: 2021-08-05 | End: 2021-10-28

## 2021-08-05 RX ORDER — AZITHROMYCIN 250 MG/1
TABLET, FILM COATED ORAL
Qty: 6 TABLET | Refills: 0 | Status: SHIPPED | OUTPATIENT
Start: 2021-08-05 | End: 2021-12-01

## 2021-08-05 RX ORDER — AMLODIPINE BESYLATE 10 MG/1
10 TABLET ORAL DAILY
Qty: 90 TABLET | Refills: 1 | Status: SHIPPED | OUTPATIENT
Start: 2021-08-05 | End: 2021-12-01 | Stop reason: SDUPTHER

## 2021-08-05 RX ORDER — DULOXETIN HYDROCHLORIDE 60 MG/1
60 CAPSULE, DELAYED RELEASE ORAL DAILY
Qty: 30 CAPSULE | Refills: 1 | Status: SHIPPED | OUTPATIENT
Start: 2021-08-05 | End: 2021-09-20

## 2021-08-05 NOTE — PROGRESS NOTES
Chief Complaint  Hypertension, Numbness (bilateral feet), Insomnia, and Anorexia    Subjective        Jose Rios presents to [facility]  Tingling numbness in right foot and now in both in from mid foot to toes.    Last week and a half feeling worse and blood pressure increased a few days.  Has only taken one of the amlodipine the last few days but blood pressure going up before that.  Been nervous and fidgety the last week and a half as well.  Denies chest pain.  Just shaky and fidgety.    Has a cough with shortness of air over the last 2 weeks        Past History:    Medical History: has a past medical history of Acid reflux disease, Anxiety, Arthritis, Bipolar disorder (CMS/HCC), Chronic bronchitis (CMS/HCC), COPD (chronic obstructive pulmonary disease) (CMS/HCC), Depression, Hemorrhoids, Hypertension, and SOB (shortness of breath).     Surgical History: has a past surgical history that includes Appendectomy; orthopedic surgery (Left); Spine surgery; Joint replacement; and Nerve Surgery.     Family History: family history includes Heart disease in his father, maternal grandfather, and mother.     Social History: reports that he quit smoking about 5 years ago. His smoking use included cigarettes. He has a 50.00 pack-year smoking history. He has never used smokeless tobacco. He reports previous alcohol use. He reports that he does not use drugs.    Allergies: Penicillins          Current Outpatient Medications:   •  albuterol (ACCUNEB) 1.25 MG/3ML nebulizer solution, Take 1 ampule by nebulization Every 6 (Six) Hours As Needed., Disp: , Rfl:   •  amLODIPine (NORVASC) 10 MG tablet, Take 1 tablet by mouth Daily., Disp: 90 tablet, Rfl: 1  •  atorvastatin (LIPITOR) 10 MG tablet, TAKE 1 TABLET (10 MG) BY MOUTH DAILY AT BEDTIME, Disp: 90 tablet, Rfl: 1  •  clonazePAM (KlonoPIN) 1 MG tablet, TAKE 1 TABLET (1 MG) BY ORAL ROUTE 2 TIMES PER DAY, Disp: 60 tablet, Rfl: 0  •  Diclofenac  Sodium (VOLTAREN) 1 % gel gel, diclofenac 1 % topical gel  APPLY 2 GRAMS TO THE AFFECTED AREA(S) FOUR TIMES A DAY, Disp: , Rfl:   •  losartan (COZAAR) 50 MG tablet, TAKE ONE TABLET BY MOUTH TWO TIMES A DAY, Disp: 180 tablet, Rfl: 1  •  metoprolol succinate XL (TOPROL-XL) 200 MG 24 hr tablet, Take 1 tablet by mouth Daily., Disp: 90 tablet, Rfl: 1  •  oxyCODONE-acetaminophen (PERCOCET)  MG per tablet, TAKE 1 TABLET EVERY 8 HOURS BY ORAL ROUTE AS NEEDED FOR 30 DAYS., Disp: , Rfl:   •  pantoprazole (Protonix) 40 MG EC tablet, Take 1 tablet by mouth Daily., Disp: , Rfl:   •  primidone (MYSOLINE) 50 MG tablet, primidone 50 mg tablet  TAKE 1 TABLET BY ORAL ROUTE 3 TIMES A DAY, Disp: , Rfl:   •  azithromycin (Zithromax Z-Harinder) 250 MG tablet, Take 2 tablets by mouth on day 1, then 1 tablet daily on days 2-5, Disp: 6 tablet, Rfl: 0  •  DULoxetine (CYMBALTA) 60 MG capsule, Take 1 capsule by mouth Daily., Disp: 30 capsule, Rfl: 1  •  Fluticasone Furoate-Vilanterol (Breo Ellipta) 200-25 MCG/INH inhaler, Inhale 1 puff Daily for 30 days. Rinse mouth out after each use., Disp: 1 each, Rfl: 11  •  tiotropium bromide monohydrate (Spiriva Respimat) 2.5 MCG/ACT aerosol solution inhaler, Inhale 2 puffs Daily for 30 days., Disp: 1 each, Rfl: 11    Medications Discontinued During This Encounter   Medication Reason   • HYDROcodone-acetaminophen (NORCO)  MG per tablet *Therapy completed   • buPROPion XL (FORFIVO XL) 450 MG 24 hr tablet *Therapy completed   • metoprolol succinate XL (TOPROL-XL) 50 MG 24 hr tablet *Therapy completed   • amLODIPine (NORVASC) 5 MG tablet Reorder   • metoprolol succinate XL (TOPROL-XL) 100 MG 24 hr tablet Reorder         Review of Systems   Constitutional: Negative for fever.   Respiratory: Negative for cough and shortness of breath.    Cardiovascular: Negative for chest pain, palpitations and leg swelling.   Neurological: Negative for dizziness, weakness, numbness and headache.        Objective  "        Vitals:    08/05/21 1128 08/05/21 1214   BP: (!) 208/90 164/65   BP Location: Right arm    Patient Position: Sitting    Cuff Size: Adult    Pulse: 62    Resp: 16    Temp: 96.4 °F (35.8 °C)    TempSrc: Infrared    SpO2: 95%    Weight: 95.3 kg (210 lb)    Height: 182.9 cm (72\")      Body mass index is 28.48 kg/m².         Physical Exam  Vitals reviewed.   Constitutional:       Appearance: Normal appearance. He is well-developed.   HENT:      Head: Normocephalic and atraumatic.      Mouth/Throat:      Pharynx: No oropharyngeal exudate.   Eyes:      Conjunctiva/sclera: Conjunctivae normal.      Pupils: Pupils are equal, round, and reactive to light.   Cardiovascular:      Rate and Rhythm: Normal rate and regular rhythm.      Heart sounds: No murmur heard.   No friction rub. No gallop.    Pulmonary:      Effort: Pulmonary effort is normal.      Breath sounds: Normal breath sounds. No wheezing or rhonchi.   Skin:     General: Skin is warm and dry.   Neurological:      Mental Status: He is alert and oriented to person, place, and time.   Psychiatric:         Mood and Affect: Mood and affect normal.         Behavior: Behavior normal.         Thought Content: Thought content normal.         Judgment: Judgment normal.             Result Review :               Assessment and Plan     Diagnoses and all orders for this visit:    1. Anxiety disorder, unspecified type (Primary)  -     DULoxetine (CYMBALTA) 60 MG capsule; Take 1 capsule by mouth Daily.  Dispense: 30 capsule; Refill: 1    2. Moderate episode of recurrent major depressive disorder (CMS/HCC)  Comments:  stop wellbutrin and start cymbalta discussed how to wean off.  Orders:  -     DULoxetine (CYMBALTA) 60 MG capsule; Take 1 capsule by mouth Daily.  Dispense: 30 capsule; Refill: 1    3. Anemia, unspecified type  -     Iron and TIBC; Future  -     Ferritin; Future  -     Vitamin B12; Future  -     CBC No Differential; Future    4. Impaired fasting glucose  -     " Hemoglobin A1c; Future    5. Essential hypertension  Comments:  increase metoprolol to 200mg to see if controls blood pressure. To watch heart rate.  Orders:  -     metoprolol succinate XL (TOPROL-XL) 200 MG 24 hr tablet; Take 1 tablet by mouth Daily.  Dispense: 90 tablet; Refill: 1  -     amLODIPine (NORVASC) 10 MG tablet; Take 1 tablet by mouth Daily.  Dispense: 90 tablet; Refill: 1  -     Comprehensive metabolic panel; Future  -     Urinalysis With Culture If Indicated -; Future    6. Hyperlipidemia, unspecified hyperlipidemia type  -     Lipid Panel w/ Chol/HDL Ratio; Future    7. Cough  -     azithromycin (Zithromax Z-Harinder) 250 MG tablet; Take 2 tablets by mouth on day 1, then 1 tablet daily on days 2-5  Dispense: 6 tablet; Refill: 0  -     XR Chest 2 View; Future              Follow Up     Return in about 1 month (around 9/5/2021).    Patient was given instructions and counseling regarding his condition or for health maintenance advice. Please see specific information pulled into the AVS if appropriate.

## 2021-08-06 ENCOUNTER — HOSPITAL ENCOUNTER (OUTPATIENT)
Dept: GENERAL RADIOLOGY | Facility: HOSPITAL | Age: 71
Discharge: HOME | End: 2021-08-06

## 2021-08-06 ENCOUNTER — LAB (OUTPATIENT)
Dept: LAB | Facility: HOSPITAL | Age: 71
End: 2021-08-06

## 2021-08-06 DIAGNOSIS — R73.01 IMPAIRED FASTING GLUCOSE: ICD-10-CM

## 2021-08-06 DIAGNOSIS — E78.5 HYPERLIPIDEMIA, UNSPECIFIED HYPERLIPIDEMIA TYPE: ICD-10-CM

## 2021-08-06 DIAGNOSIS — D64.9 ANEMIA, UNSPECIFIED TYPE: ICD-10-CM

## 2021-08-06 DIAGNOSIS — R05.9 COUGH: ICD-10-CM

## 2021-08-06 DIAGNOSIS — I10 ESSENTIAL HYPERTENSION: ICD-10-CM

## 2021-08-06 LAB
ALBUMIN SERPL-MCNC: 4.6 G/DL (ref 3.5–5.2)
ALBUMIN/GLOB SERPL: 1.6 G/DL
ALP SERPL-CCNC: 76 U/L (ref 39–117)
ALT SERPL W P-5'-P-CCNC: 33 U/L (ref 1–41)
ANION GAP SERPL CALCULATED.3IONS-SCNC: 11.2 MMOL/L (ref 5–15)
AST SERPL-CCNC: 21 U/L (ref 1–40)
BILIRUB SERPL-MCNC: 0.5 MG/DL (ref 0–1.2)
BILIRUB UR QL STRIP: NEGATIVE
BUN SERPL-MCNC: 11 MG/DL (ref 8–23)
BUN/CREAT SERPL: 18.6 (ref 7–25)
CALCIUM SPEC-SCNC: 9.3 MG/DL (ref 8.6–10.5)
CHLORIDE SERPL-SCNC: 91 MMOL/L (ref 98–107)
CHOLEST SERPL-MCNC: 152 MG/DL (ref 0–200)
CLARITY UR: CLEAR
CO2 SERPL-SCNC: 19.8 MMOL/L (ref 22–29)
COLOR UR: YELLOW
CREAT SERPL-MCNC: 0.59 MG/DL (ref 0.76–1.27)
DEPRECATED RDW RBC AUTO: 41.4 FL (ref 37–54)
ERYTHROCYTE [DISTWIDTH] IN BLOOD BY AUTOMATED COUNT: 12.9 % (ref 12.3–15.4)
FERRITIN SERPL-MCNC: 188 NG/ML (ref 30–400)
GFR SERPL CREATININE-BSD FRML MDRD: 137 ML/MIN/1.73
GLOBULIN UR ELPH-MCNC: 2.9 GM/DL
GLUCOSE SERPL-MCNC: 111 MG/DL (ref 65–99)
GLUCOSE UR STRIP-MCNC: NEGATIVE MG/DL
HBA1C MFR BLD: 5.5 % (ref 4.8–5.6)
HCT VFR BLD AUTO: 43 % (ref 37.5–51)
HDLC SERPL QL: 4
HDLC SERPL-MCNC: 38 MG/DL (ref 40–60)
HGB BLD-MCNC: 14.6 G/DL (ref 13–17.7)
HGB UR QL STRIP.AUTO: NEGATIVE
IRON 24H UR-MRATE: 103 MCG/DL (ref 59–158)
IRON SATN MFR SERPL: 29 % (ref 20–50)
KETONES UR QL STRIP: NEGATIVE
LDLC SERPL CALC-MCNC: 94 MG/DL (ref 0–100)
LEUKOCYTE ESTERASE UR QL STRIP.AUTO: NEGATIVE
MCH RBC QN AUTO: 29.9 PG (ref 26.6–33)
MCHC RBC AUTO-ENTMCNC: 34 G/DL (ref 31.5–35.7)
MCV RBC AUTO: 88.1 FL (ref 79–97)
NITRITE UR QL STRIP: NEGATIVE
PH UR STRIP.AUTO: 7 [PH] (ref 5–8)
PLATELET # BLD AUTO: 352 10*3/MM3 (ref 140–450)
PMV BLD AUTO: 9.2 FL (ref 6–12)
POTASSIUM SERPL-SCNC: 4.5 MMOL/L (ref 3.5–5.2)
PROT SERPL-MCNC: 7.5 G/DL (ref 6–8.5)
PROT UR QL STRIP: ABNORMAL
RBC # BLD AUTO: 4.88 10*6/MM3 (ref 4.14–5.8)
SODIUM SERPL-SCNC: 122 MMOL/L (ref 136–145)
SP GR UR STRIP: 1.01 (ref 1–1.03)
TIBC SERPL-MCNC: 353 MCG/DL (ref 298–536)
TRANSFERRIN SERPL-MCNC: 237 MG/DL (ref 200–360)
TRIGL SERPL-MCNC: 107 MG/DL (ref 0–150)
UROBILINOGEN UR QL STRIP: ABNORMAL
VIT B12 BLD-MCNC: 1550 PG/ML (ref 211–946)
VLDLC SERPL-MCNC: 20 MG/DL (ref 5–40)
WBC # BLD AUTO: 9.6 10*3/MM3 (ref 3.4–10.8)

## 2021-08-06 PROCEDURE — 85027 COMPLETE CBC AUTOMATED: CPT

## 2021-08-06 PROCEDURE — 83036 HEMOGLOBIN GLYCOSYLATED A1C: CPT

## 2021-08-06 PROCEDURE — 84466 ASSAY OF TRANSFERRIN: CPT

## 2021-08-06 PROCEDURE — 81001 URINALYSIS AUTO W/SCOPE: CPT

## 2021-08-06 PROCEDURE — 36415 COLL VENOUS BLD VENIPUNCTURE: CPT

## 2021-08-06 PROCEDURE — 83540 ASSAY OF IRON: CPT

## 2021-08-06 PROCEDURE — 82607 VITAMIN B-12: CPT

## 2021-08-06 PROCEDURE — 80053 COMPREHEN METABOLIC PANEL: CPT

## 2021-08-06 PROCEDURE — 80061 LIPID PANEL: CPT

## 2021-08-06 PROCEDURE — 82728 ASSAY OF FERRITIN: CPT

## 2021-08-06 PROCEDURE — 71046 X-RAY EXAM CHEST 2 VIEWS: CPT

## 2021-08-07 LAB
BACTERIA UR QL AUTO: ABNORMAL /HPF
HYALINE CASTS UR QL AUTO: ABNORMAL /LPF
RBC # UR: ABNORMAL /HPF
REF LAB TEST METHOD: ABNORMAL
SPERM URNS QL MICRO: ABNORMAL /HPF
SQUAMOUS #/AREA URNS HPF: ABNORMAL /HPF
WBC UR QL AUTO: ABNORMAL /HPF

## 2021-08-09 ENCOUNTER — TELEPHONE (OUTPATIENT)
Dept: FAMILY MEDICINE CLINIC | Facility: CLINIC | Age: 71
End: 2021-08-09

## 2021-08-09 NOTE — TELEPHONE ENCOUNTER
----- Message from CHRIS Toussaint sent at 8/8/2021  9:09 PM EDT -----  Sodium is really low that could contribute to why not feeling well.  Need to restrict fluids for the next week and recheck in 1 week.  How is blood pressure readings.

## 2021-08-11 ENCOUNTER — TELEPHONE (OUTPATIENT)
Dept: FAMILY MEDICINE CLINIC | Facility: CLINIC | Age: 71
End: 2021-08-11

## 2021-08-11 NOTE — TELEPHONE ENCOUNTER
----- Message from CHRIS Toussaint sent at 8/6/2021  1:12 PM EDT -----   Negative chest xray.  How is blood pressure today and feeling of weakness?

## 2021-08-12 DIAGNOSIS — F41.9 ANXIETY: ICD-10-CM

## 2021-08-13 RX ORDER — CLONAZEPAM 1 MG/1
TABLET ORAL
Qty: 60 TABLET | Refills: 0 | Status: SHIPPED | OUTPATIENT
Start: 2021-08-13 | End: 2021-09-08

## 2021-08-23 ENCOUNTER — APPOINTMENT (OUTPATIENT)
Dept: RESPIRATORY THERAPY | Facility: HOSPITAL | Age: 71
End: 2021-08-23

## 2021-09-01 ENCOUNTER — OFFICE VISIT (OUTPATIENT)
Dept: FAMILY MEDICINE CLINIC | Facility: CLINIC | Age: 71
End: 2021-09-01

## 2021-09-01 ENCOUNTER — HOSPITAL ENCOUNTER (OUTPATIENT)
Dept: GENERAL RADIOLOGY | Facility: HOSPITAL | Age: 71
Discharge: HOME | End: 2021-09-01
Admitting: NURSE PRACTITIONER

## 2021-09-01 VITALS
SYSTOLIC BLOOD PRESSURE: 96 MMHG | TEMPERATURE: 96.8 F | RESPIRATION RATE: 16 BRPM | OXYGEN SATURATION: 91 % | DIASTOLIC BLOOD PRESSURE: 64 MMHG | HEART RATE: 59 BPM

## 2021-09-01 DIAGNOSIS — M79.671 PAIN IN BOTH FEET: ICD-10-CM

## 2021-09-01 DIAGNOSIS — R42 DIZZINESS: ICD-10-CM

## 2021-09-01 DIAGNOSIS — M79.671 PAIN IN BOTH FEET: Primary | ICD-10-CM

## 2021-09-01 DIAGNOSIS — M79.672 PAIN IN BOTH FEET: Primary | ICD-10-CM

## 2021-09-01 DIAGNOSIS — M79.672 PAIN IN BOTH FEET: ICD-10-CM

## 2021-09-01 PROCEDURE — 99213 OFFICE O/P EST LOW 20 MIN: CPT | Performed by: NURSE PRACTITIONER

## 2021-09-01 PROCEDURE — 73630 X-RAY EXAM OF FOOT: CPT

## 2021-09-02 ENCOUNTER — TELEPHONE (OUTPATIENT)
Dept: FAMILY MEDICINE CLINIC | Facility: CLINIC | Age: 71
End: 2021-09-02

## 2021-09-08 ENCOUNTER — PATIENT OUTREACH (OUTPATIENT)
Dept: CASE MANAGEMENT | Facility: OTHER | Age: 71
End: 2021-09-08

## 2021-09-08 DIAGNOSIS — I10 ESSENTIAL HYPERTENSION: Primary | ICD-10-CM

## 2021-09-08 DIAGNOSIS — F41.9 ANXIETY: ICD-10-CM

## 2021-09-08 PROCEDURE — 99490 CHRNC CARE MGMT STAFF 1ST 20: CPT | Performed by: NURSE PRACTITIONER

## 2021-09-08 RX ORDER — CLONAZEPAM 1 MG/1
TABLET ORAL
Qty: 60 TABLET | Refills: 0 | Status: SHIPPED | OUTPATIENT
Start: 2021-09-08 | End: 2021-10-08

## 2021-09-08 NOTE — OUTREACH NOTE
Ambulatory Case Management Note    CCM Interim Update    Reviewed chart prior to calling patient for his monthly outreach. Patient states that he has been okay, he went to Podiatry on 9/2 for an injection in his right foot, I called and requested those records as well.  Reminded patient that he needed to get his sodium rechecked last month where it was low on 8/6. Also reminded patient to bring his blood pressure cuff in so that we cold compare his machine to ours. At his appointment with PCP on 9/1 his blood pressure was low, however he says the machine at home still says that it is high.   Made another appointment for patient for the colonoscopy referral for 11/30 at 8:30, I could not get him a later appointment like he wanted.   Called and informed patient of his new appointment and he had to other needs at this time.      There are no recently modified care plans to display for this patient.      Sushila Zayas RN  Ambulatory Case Management    9/8/2021, 14:00 EDT

## 2021-09-20 DIAGNOSIS — F33.1 MODERATE EPISODE OF RECURRENT MAJOR DEPRESSIVE DISORDER (HCC): ICD-10-CM

## 2021-09-20 DIAGNOSIS — F41.9 ANXIETY DISORDER, UNSPECIFIED TYPE: ICD-10-CM

## 2021-09-20 RX ORDER — DULOXETIN HYDROCHLORIDE 60 MG/1
CAPSULE, DELAYED RELEASE ORAL
Qty: 30 CAPSULE | Refills: 5 | Status: SHIPPED | OUTPATIENT
Start: 2021-09-20 | End: 2021-12-01

## 2021-09-30 ENCOUNTER — PATIENT OUTREACH (OUTPATIENT)
Dept: CASE MANAGEMENT | Facility: OTHER | Age: 71
End: 2021-09-30

## 2021-09-30 DIAGNOSIS — I10 ESSENTIAL HYPERTENSION: Primary | ICD-10-CM

## 2021-09-30 DIAGNOSIS — J44.9 CHRONIC OBSTRUCTIVE PULMONARY DISEASE, UNSPECIFIED COPD TYPE (HCC): ICD-10-CM

## 2021-09-30 NOTE — OUTREACH NOTE
[facility] End of Month Documentation    This Chronic Medical Management Care Plan for Jose Rios, 70 y.o. male, has been a new plan of care implemented and a new plan of care implemented for the month of September.  A cumulative time of 20  minutes was spent on this patient record this month, including phone call with patient;chart review;phone call with other providers.    Regarding the patient's problems: has Anemia; Anxiety disorder; Chronic obstructive pulmonary disease (CMS/[facility], located within [facility]); CKD (chronic kidney disease) stage 3, GFR 30-59 ml/min (CMS/[facility], located within [facility]); Essential hypertension; Hyperlipidemia; Hyponatremia; Major depressive disorder; MVP (mitral valve prolapse); JIM (obstructive sleep apnea); Osteoarthritis; Depression; Centrilobular emphysema (CMS/LTAC, located within [facility]); DDD (degenerative disc disease), cervical; Encounter for long-term (current) use of insulin (CMS/LTAC, located within [facility]); and Spondylosis without myelopathy on their problem list., the following items were addressed: medical records;medications;referrals to community service providers and any changes can be found within the plan section of the note.  A detailed listing of time spent for chronic care management is tracked within each outreach encounter.  Current medications include:  has a current medication list which includes the following prescription(s): albuterol, amlodipine, atorvastatin, azithromycin, clonazepam, diclofenac sodium, duloxetine, breo ellipta, losartan, metoprolol succinate xl, oxycodone-acetaminophen, pantoprazole, primidone, and spiriva respimat. and the patient is reported to be No data recorded,  Medications are reported to be effective.  Regarding these diagnoses, referrals were made to the following provider(s): Podiatry.  All notes on chart for PCP to review.    The patient was monitored remotely for blood pressure;activity level;mood & behavior;pain;medications.    The patient's physical needs include:  medication education.     The patient's mental support needs  include:  continued support    The patient's cognitive support needs include:  medication;continued support;health care    The patient's psychosocial support needs include:  needs met    The patient's functional needs include: physical healthcare;medication education;physician referral    The patient's environmental needs include:  N/A    Care Plan overall comments:  N/A    Refer to previous outreach notes for more information on the areas listed above.    Monthly Billing Diagnoses  (I10) Essential hypertension    (J44.9) Chronic obstructive pulmonary disease, unspecified COPD type (HCC)    Medications   · Medications have been reconciled    Care Plan progress this month:      Recently Modified Care Plans Updates made since 8/30/2021 12:00 AM    No recently modified care plans.        Instructions   · Patient was provided an electronic copy of care plan  · CCM services were explained and offered and patient has accepted these services.  · Patient has given their written consent to receive CCM services and understands that this includes the authorization of electronic communication of medical information with the other treating providers.  · Patient understands that they may stop CCM services at any time and these changes will be effective at the end of the calendar month and will effectively revocate the agreement of CCM services.  · Patient understands that only one practitioner can furnish and be paid for CCM services during one calendar month.  Patient also understands that there may be co-payment or deductible fees in association with CCM services.  · Patient will continue with at least monthly follow-up calls with the Nurse Navigator.    Sushila Zayas RN  Ambulatory Case Management    9/30/2021, 15:48 EDT

## 2021-10-08 DIAGNOSIS — F41.9 ANXIETY: ICD-10-CM

## 2021-10-08 RX ORDER — CLONAZEPAM 1 MG/1
TABLET ORAL
Qty: 60 TABLET | Refills: 0 | Status: SHIPPED | OUTPATIENT
Start: 2021-10-08 | End: 2021-11-03

## 2021-10-13 ENCOUNTER — CLINICAL SUPPORT (OUTPATIENT)
Dept: FAMILY MEDICINE CLINIC | Facility: CLINIC | Age: 71
End: 2021-10-13

## 2021-10-13 VITALS — SYSTOLIC BLOOD PRESSURE: 148 MMHG | DIASTOLIC BLOOD PRESSURE: 80 MMHG

## 2021-10-13 DIAGNOSIS — I10 HYPERTENSION, UNSPECIFIED TYPE: Primary | ICD-10-CM

## 2021-10-13 PROCEDURE — 99211 OFF/OP EST MAY X REQ PHY/QHP: CPT | Performed by: NURSE PRACTITIONER

## 2021-10-25 RX ORDER — PANTOPRAZOLE SODIUM 40 MG/1
TABLET, DELAYED RELEASE ORAL
Qty: 90 TABLET | Refills: 2 | Status: SHIPPED | OUTPATIENT
Start: 2021-10-25 | End: 2022-06-09 | Stop reason: SDUPTHER

## 2021-10-28 RX ORDER — METOPROLOL SUCCINATE 50 MG/1
150 TABLET, EXTENDED RELEASE ORAL DAILY
Qty: 90 TABLET | Refills: 5 | Status: SHIPPED | OUTPATIENT
Start: 2021-10-28 | End: 2021-12-01 | Stop reason: SDUPTHER

## 2021-11-03 DIAGNOSIS — F41.9 ANXIETY: ICD-10-CM

## 2021-11-03 RX ORDER — CLONAZEPAM 1 MG/1
TABLET ORAL
Qty: 60 TABLET | Refills: 0 | Status: SHIPPED | OUTPATIENT
Start: 2021-11-03 | End: 2021-12-02

## 2021-11-10 ENCOUNTER — PATIENT OUTREACH (OUTPATIENT)
Dept: CASE MANAGEMENT | Facility: OTHER | Age: 71
End: 2021-11-10

## 2021-11-10 DIAGNOSIS — I10 ESSENTIAL HYPERTENSION: Primary | ICD-10-CM

## 2021-11-10 RX ORDER — NYSTATIN 100000 [USP'U]/G
POWDER TOPICAL
Qty: 60 G | Refills: 2 | Status: SHIPPED | OUTPATIENT
Start: 2021-11-10

## 2021-11-10 NOTE — OUTREACH NOTE
Ambulatory Case Management Note    [facility] Interim Update    Reviewed chart prior to calling patient for outreach. Called house number and no one answered, left message with his wife when I called the cell phone to call me back at my direct number.   Patient seen pain management on 11/1- he is on Percocet 10 mg Q 8 hrs PRN, and Voltaren gel to joints, OV note said that he was not interested in injection therapy at this time. He tried physical therapy in 2010 for his back pain, however it made it worse, so he stopped.   He seen pulmonology last in June, and seems to be controlled on the CPAP and his inhalers.  There are no recently modified care plans to display for this patient.    Sushila Zayas RN  Ambulatory Case Management  11/10/2021, 15:52 EST

## 2021-11-29 ENCOUNTER — TELEPHONE (OUTPATIENT)
Dept: FAMILY MEDICINE CLINIC | Facility: CLINIC | Age: 71
End: 2021-11-29

## 2021-11-29 DIAGNOSIS — F41.9 ANXIETY: ICD-10-CM

## 2021-11-29 NOTE — TELEPHONE ENCOUNTER
Caller: Jose Rios    Relationship: Self    Best call back number: [phone]    What is the best time to reach you: ANY    Who are you requesting to speak with (clinical staff, provider,  specific staff member): CLINICAL    What was the call regarding: PATIENT IS SCHEDULED FOR COLONOSCOPY TOMORROW 11/30 AND WAS NOT GIVEN INSTRUCTIONS FOR LIQUIDS. PLEASE CALL AND ADVISE.     Do you require a callback: YES

## 2021-11-30 ENCOUNTER — OFFICE VISIT (OUTPATIENT)
Dept: SURGERY | Facility: CLINIC | Age: 71
End: 2021-11-30

## 2021-11-30 ENCOUNTER — PATIENT OUTREACH (OUTPATIENT)
Dept: CASE MANAGEMENT | Facility: OTHER | Age: 71
End: 2021-11-30

## 2021-11-30 ENCOUNTER — PREP FOR SURGERY (OUTPATIENT)
Dept: OTHER | Facility: HOSPITAL | Age: 71
End: 2021-11-30

## 2021-11-30 VITALS — BODY MASS INDEX: 30.37 KG/M2 | WEIGHT: 224.2 LBS | HEART RATE: 58 BPM | HEIGHT: 72 IN

## 2021-11-30 DIAGNOSIS — Z12.11 SCREENING FOR MALIGNANT NEOPLASM OF COLON: Primary | ICD-10-CM

## 2021-11-30 DIAGNOSIS — J44.9 CHRONIC OBSTRUCTIVE PULMONARY DISEASE, UNSPECIFIED COPD TYPE (HCC): ICD-10-CM

## 2021-11-30 DIAGNOSIS — I10 ESSENTIAL HYPERTENSION: Primary | ICD-10-CM

## 2021-11-30 PROCEDURE — 99202 OFFICE O/P NEW SF 15 MIN: CPT | Performed by: NURSE PRACTITIONER

## 2021-11-30 PROCEDURE — 99490 CHRNC CARE MGMT STAFF 1ST 20: CPT | Performed by: NURSE PRACTITIONER

## 2021-11-30 RX ORDER — SODIUM CHLORIDE 0.9 % (FLUSH) 0.9 %
10 SYRINGE (ML) INJECTION AS NEEDED
Status: CANCELLED | OUTPATIENT
Start: 2021-11-30

## 2021-11-30 RX ORDER — AMLODIPINE BESYLATE 5 MG/1
5 TABLET ORAL DAILY
COMMUNITY
Start: 2021-10-28 | End: 2021-12-01 | Stop reason: SDUPTHER

## 2021-11-30 RX ORDER — POLYETHYLENE GLYCOL 3350 17 G/17G
POWDER, FOR SOLUTION ORAL
Qty: 238 PACKET | Refills: 0 | Status: SHIPPED | OUTPATIENT
Start: 2021-11-30 | End: 2022-03-02

## 2021-11-30 RX ORDER — SODIUM CHLORIDE 0.9 % (FLUSH) 0.9 %
3 SYRINGE (ML) INJECTION EVERY 12 HOURS SCHEDULED
Status: CANCELLED | OUTPATIENT
Start: 2021-11-30

## 2021-11-30 NOTE — PROGRESS NOTES
Chief Complaint: Colonoscopy (Pt here for a colon consult )    Subjective      Colonoscopy consultation       History of Present Illness  Jose Rios is a 70 y.o. male presents to [facility] for colonoscopy consultation.    Patient presents today on referral from Jagdeep Harrison for colonoscopy consultation.    Patient denies any abdominal pain, change in bowel habit, rectal bleeding.    Denies any family history of colorectal cancer.    No previous colonoscopy.    Objective     Past Medical History:   Diagnosis Date   • Acid reflux disease    • Anxiety    • Arthritis     GENERALIZED  R KNEE   • Bipolar disorder (HCC)    • Chronic bronchitis (HCC)    • COPD (chronic obstructive pulmonary disease) (HCC)     USES INHALERS   • Depression    • Hemorrhoids     RECTAL PROB (ACID REFLUX)   • Hypertension     CONTROLLED WITH MEDS   • SOB (shortness of breath)        Past Surgical History:   Procedure Laterality Date   • APPENDECTOMY      45-50 YRS AGO   • JOINT REPLACEMENT      KNEE RIGHT   • NERVE SURGERY      LEFT ARM   • ORTHOPEDIC SURGERY Left     MIDDLE TRIGGER FINGER SX rtk)   • SPINE SURGERY      SURGERY ON C-5-C6 IN 1996 AND 1999)         Current Outpatient Medications:   •  albuterol (ACCUNEB) 1.25 MG/3ML nebulizer solution, Take 1 ampule by nebulization Every 6 (Six) Hours As Needed., Disp: , Rfl:   •  amLODIPine (NORVASC) 5 MG tablet, Take 5 mg by mouth Daily., Disp: , Rfl:   •  atorvastatin (LIPITOR) 10 MG tablet, TAKE 1 TABLET (10 MG) BY MOUTH DAILY AT BEDTIME, Disp: 90 tablet, Rfl: 1  •  azithromycin (Zithromax Z-Harinder) 250 MG tablet, Take 2 tablets by mouth on day 1, then 1 tablet daily on days 2-5, Disp: 6 tablet, Rfl: 0  •  clonazePAM (KlonoPIN) 1 MG tablet, TAKE ONE TABLET BY MOUTH TWO TIMES A DAY, Disp: 60 tablet, Rfl: 0  •  DULoxetine (CYMBALTA) 60 MG capsule, TAKE ONE CAPSULE BY MOUTH EVERY DAY, Disp: 30 capsule, Rfl: 5  •  losartan (COZAAR) 50 MG tablet, TAKE ONE  TABLET BY MOUTH TWO TIMES A DAY, Disp: 180 tablet, Rfl: 1  •  metoprolol succinate XL (TOPROL-XL) 50 MG 24 hr tablet, Take 3 tablets by mouth Daily., Disp: 90 tablet, Rfl: 5  •  oxyCODONE-acetaminophen (PERCOCET)  MG per tablet, TAKE 1 TABLET EVERY 8 HOURS BY ORAL ROUTE AS NEEDED FOR 30 DAYS., Disp: , Rfl:   •  pantoprazole (PROTONIX) 40 MG EC tablet, TAKE 1 TABLET (40 MG) BY ORAL ROUTE ONCE DAILY, Disp: 90 tablet, Rfl: 2  •  primidone (MYSOLINE) 50 MG tablet, primidone 50 mg tablet  TAKE 1 TABLET BY ORAL ROUTE 3 TIMES A DAY, Disp: , Rfl:   •  amLODIPine (NORVASC) 10 MG tablet, Take 1 tablet by mouth Daily., Disp: 90 tablet, Rfl: 1  •  Diclofenac Sodium (VOLTAREN) 1 % gel gel, diclofenac 1 % topical gel  APPLY 2 GRAMS TO THE AFFECTED AREA(S) FOUR TIMES A DAY, Disp: , Rfl:   •  Fluticasone Furoate-Vilanterol (Breo Ellipta) 200-25 MCG/INH inhaler, Inhale 1 puff Daily for 30 days. Rinse mouth out after each use., Disp: 1 each, Rfl: 11  •  nystatin (MYCOSTATIN) 673198 UNIT/GM powder, APPLY TO THE AFFECTED AREA(S) BY TOPICAL ROUTE 3 TIMES PER DAY, Disp: 60 g, Rfl: 2  •  polyethylene glycol (MIRALAX) 17 g packet, Take as directed.  Instructions given in office.  Dispense: 238 g bottle, Disp: 238 packet, Rfl: 0  •  tiotropium bromide monohydrate (Spiriva Respimat) 2.5 MCG/ACT aerosol solution inhaler, Inhale 2 puffs Daily for 30 days., Disp: 1 each, Rfl: 11    Allergies   Allergen Reactions   • Penicillins Swelling        Family History   Problem Relation Age of Onset   • Heart disease Mother    • Heart disease Father    • Heart disease Maternal Grandfather        Social History     Socioeconomic History   • Marital status:    Tobacco Use   • Smoking status: Former Smoker     Packs/day: 1.00     Years: 50.00     Pack years: 50.00     Types: Cigarettes     Quit date: 2016     Years since quittin.3   • Smokeless tobacco: Never Used   Vaping Use   • Vaping Use: Never used   Substance and Sexual Activity  "  • Alcohol use: Not Currently   • Drug use: Never   • Sexual activity: Not Currently     Partners: Female       Review of Systems   Constitutional: Negative for chills and fever.   Gastrointestinal: Negative for abdominal distention, abdominal pain, anal bleeding, blood in stool, constipation, diarrhea and rectal pain.        Vital Signs:   Pulse 58   Ht 182.9 cm (72\")   Wt 102 kg (224 lb 3.2 oz)   BMI 30.41 kg/m²      Physical Exam  Constitutional:       Appearance: Normal appearance.   HENT:      Head: Normocephalic.   Cardiovascular:      Rate and Rhythm: Normal rate.   Pulmonary:      Effort: Pulmonary effort is normal.   Abdominal:      General: Abdomen is flat.      Palpations: Abdomen is soft.   Skin:     General: Skin is warm and dry.   Neurological:      General: No focal deficit present.      Mental Status: He is alert and oriented to person, place, and time.   Psychiatric:         Mood and Affect: Mood normal.         Thought Content: Thought content normal.          Result Review :              []  Laboratory  []  Radiology  []  Pathology  []  Microbiology  []  EKG/Telemetry   []  Cardiology/Vascular   [x]  Old records  Today I reviewed Jagdeep Harrison's previous office note.     Assessment and Plan    Diagnoses and all orders for this visit:    1. Screening for malignant neoplasm of colon (Primary)  -     polyethylene glycol (MIRALAX) 17 g packet; Take as directed.  Instructions given in office.  Dispense: 238 g bottle  Dispense: 238 packet; Refill: 0    (white prep)    Follow Up   Return for Schedule colonoscopy with Dr. sAh on 1/4/2022 at [facility].     Hospital arrival time 1 PM    Possible risks/complications, benefits, and alternatives to surgical or invasive procedure have been explained to patient and/or legal guardian.    Patient has been evaluated and can tolerate anesthesia and/or sedation. Risks, benefits, and alternatives to anesthesia and sedation have been explained " to patient and/or legal guardian.     Patient was given instructions and counseling regarding his condition or for health maintenance advice. Please see specific information pulled into the AVS if appropriate.

## 2021-11-30 NOTE — OUTREACH NOTE
Ambulatory Case Management Note  CCM Interim Update    Was able to get in touch with the patient today. Asked if he has had his flu vaccine, and he said that he [facility] back [facility]. He said that he has only had a couple dizzy spells, and his BP has been running around 150/80. Reminded him about appointment tomorrow with PCP and reminded him to bring that BP log with him, and he voiced understanding. He had OV today to get his Screening Colonoscopy scheduled, they got that for 1/4 with Dr. Ash.   Asked patient if he was having any SOA, and he said no more than usual. Continues to use C-PAP at , and he said that he tried to get a new one, but they told him he couldn't until next year.  Called Pharmacy and she said that he got it on 9/20/21, updated chart.  NO further questions or concerns at this time.  There are no recently modified care plans to display for this patient.    Sushila Zayas RN  Ambulatory Case Management  11/30/2021, 15:34 EST

## 2021-11-30 NOTE — OUTREACH NOTE
[facility] End of Month Documentation    This Chronic Medical Management Care Plan for Jose Rios, 70 y.o. male, has been monitored and managed; reviewed and a new plan of care implemented for the month of November.  A cumulative time of 23  minutes was spent on this patient record this month, including phone call with patient; chart review.    Regarding the patient's problems: has Anemia; Anxiety disorder; Chronic obstructive pulmonary disease (HCC); CKD (chronic kidney disease) stage 3, GFR 30-59 ml/min (HCC); Essential hypertension; Hyperlipidemia; Hyponatremia; Major depressive disorder; MVP (mitral valve prolapse); JIM (obstructive sleep apnea); Osteoarthritis; Depression; Centrilobular emphysema (HCC); DDD (degenerative disc disease), cervical; Encounter for long-term (current) use of insulin (HCC); and Spondylosis without myelopathy on their problem list., the following items were addressed: medical records; medications; referrals to community service providers and any changes can be found within the plan section of the note.  A detailed listing of time spent for chronic care management is tracked within each outreach encounter.  Current medications include:  has a current medication list which includes the following prescription(s): albuterol, amlodipine, amlodipine, atorvastatin, azithromycin, clonazepam, diclofenac sodium, duloxetine, breo ellipta, losartan, metoprolol succinate xl, nystatin, oxycodone-acetaminophen, pantoprazole, polyethylene glycol, primidone, and spiriva respimat. and the patient is reported to be patient is compliant with medication protocol,  Medications are reported to be effective.  Regarding these diagnoses no new, referrals were made.  All notes on chart for PCP to review.    The patient was monitored remotely for blood pressure; activity level; pain; medications.    The patient's physical needs include:  medication education.     The patient's mental support needs include:  not  applicable    The patient's cognitive support needs include:  medication; continued support; health care    The patient's psychosocial support needs include:  medication management or adherence    The patient's functional needs include: physical healthcare; medication education; physician referral    The patient's environmental needs include:  N/A    Care Plan overall comments:  N/A    Refer to previous outreach notes for more information on the areas listed above.    Monthly Billing Diagnoses  (I10) Essential hypertension    (J44.9) Chronic obstructive pulmonary disease, unspecified COPD type (HCC)    Medications   · Medications have been reconciled    Care Plan progress this month:      Recently Modified Care Plans Updates made since 10/30/2021 12:00 AM    No recently modified care plans.        Instructions   · Patient was provided an electronic copy of care plan  · CCM services were explained and offered and patient has accepted these services.  · Patient has given their written consent to receive CCM services and understands that this includes the authorization of electronic communication of medical information with the other treating providers.  · Patient understands that they may stop CCM services at any time and these changes will be effective at the end of the calendar month and will effectively revocate the agreement of CCM services.  · Patient understands that only one practitioner can furnish and be paid for CCM services during one calendar month.  Patient also understands that there may be co-payment or deductible fees in association with CCM services.  · Patient will continue with at least monthly follow-up calls with the Nurse Navigator.    Sushila Zayas RN  Ambulatory Case Management    11/30/2021, 16:03 EST

## 2021-12-01 ENCOUNTER — OFFICE VISIT (OUTPATIENT)
Dept: FAMILY MEDICINE CLINIC | Facility: CLINIC | Age: 71
End: 2021-12-01

## 2021-12-01 VITALS
RESPIRATION RATE: 16 BRPM | DIASTOLIC BLOOD PRESSURE: 82 MMHG | WEIGHT: 223.4 LBS | SYSTOLIC BLOOD PRESSURE: 162 MMHG | BODY MASS INDEX: 30.26 KG/M2 | HEIGHT: 72 IN | HEART RATE: 70 BPM | OXYGEN SATURATION: 95 % | TEMPERATURE: 98.6 F

## 2021-12-01 DIAGNOSIS — Z23 NEED FOR SHINGLES VACCINE: Primary | ICD-10-CM

## 2021-12-01 DIAGNOSIS — I10 ESSENTIAL HYPERTENSION: ICD-10-CM

## 2021-12-01 DIAGNOSIS — E78.2 MIXED HYPERLIPIDEMIA: ICD-10-CM

## 2021-12-01 DIAGNOSIS — F33.1 MODERATE EPISODE OF RECURRENT MAJOR DEPRESSIVE DISORDER (HCC): ICD-10-CM

## 2021-12-01 DIAGNOSIS — R25.1 TREMOR: ICD-10-CM

## 2021-12-01 DIAGNOSIS — Z23 NEED FOR TDAP VACCINATION: ICD-10-CM

## 2021-12-01 DIAGNOSIS — E55.9 VITAMIN D DEFICIENCY: ICD-10-CM

## 2021-12-01 PROCEDURE — 99214 OFFICE O/P EST MOD 30 MIN: CPT | Performed by: NURSE PRACTITIONER

## 2021-12-01 RX ORDER — PRIMIDONE 50 MG/1
50 TABLET ORAL 3 TIMES DAILY
Qty: 270 TABLET | Refills: 1 | Status: SHIPPED | OUTPATIENT
Start: 2021-12-01 | End: 2022-06-09 | Stop reason: SDUPTHER

## 2021-12-01 RX ORDER — ATORVASTATIN CALCIUM 10 MG/1
10 TABLET, FILM COATED ORAL DAILY
Qty: 90 TABLET | Refills: 1 | Status: SHIPPED | OUTPATIENT
Start: 2021-12-01 | End: 2022-06-09 | Stop reason: SDUPTHER

## 2021-12-01 RX ORDER — METOPROLOL SUCCINATE 50 MG/1
150 TABLET, EXTENDED RELEASE ORAL DAILY
Qty: 90 TABLET | Refills: 5 | Status: SHIPPED | OUTPATIENT
Start: 2021-12-01 | End: 2022-01-25 | Stop reason: SDUPTHER

## 2021-12-01 RX ORDER — FLUOXETINE HYDROCHLORIDE 20 MG/1
20 CAPSULE ORAL DAILY
Qty: 90 CAPSULE | Refills: 1 | Status: SHIPPED | OUTPATIENT
Start: 2021-12-01 | End: 2021-12-30

## 2021-12-01 RX ORDER — CLOSTRIDIUM TETANI TOXOID ANTIGEN (FORMALDEHYDE INACTIVATED), CORYNEBACTERIUM DIPHTHERIAE TOXOID ANTIGEN (FORMALDEHYDE INACTIVATED), BORDETELLA PERTUSSIS TOXOID ANTIGEN (GLUTARALDEHYDE INACTIVATED), BORDETELLA PERTUSSIS FILAMENTOUS HEMAGGLUTININ ANTIGEN (FORMALDEHYDE INACTIVATED), BORDETELLA PERTUSSIS PERTACTIN ANTIGEN, AND BORDETELLA PERTUSSIS FIMBRIAE 2/3 ANTIGEN 5; 2; 2.5; 5; 3; 5 [LF]/.5ML; [LF]/.5ML; UG/.5ML; UG/.5ML; UG/.5ML; UG/.5ML
0.5 INJECTION, SUSPENSION INTRAMUSCULAR ONCE
Qty: 0.5 ML | Refills: 0 | Status: SHIPPED | OUTPATIENT
Start: 2021-12-01 | End: 2021-12-01

## 2021-12-01 RX ORDER — AMLODIPINE BESYLATE 5 MG/1
5 TABLET ORAL DAILY
Qty: 90 TABLET | Refills: 1 | Status: SHIPPED | OUTPATIENT
Start: 2021-12-01 | End: 2022-06-09 | Stop reason: SDUPTHER

## 2021-12-01 NOTE — PROGRESS NOTES
Chief Complaint  Depression (f/u cymbalta not helping), Hypertension (f/u), Anxiety (f/u), Hyperlipidemia (f/u), Heartburn (f/u), and Lower Extremity Issue (leg pain right)    Subjective        Jose Rios presents to [facility]  Depression:  cymbalta  Not helping depression or pain.  Wellbutrin didn't help either.  Doesn't think Zoloft  Worked.    Hypertension:  Blood pressure up a little today.  States can fluctuate.    Anxiety:  Still having anxiety.    Hyperlipidemia:  Doing well on medication    Leg pain in the right.  Has had pain in knee that has been bothering him for 8 months.        Past History:    Medical History: has a past medical history of Acid reflux disease, Anxiety, Arthritis, Bipolar disorder (HCC), Chronic bronchitis (HCC), COPD (chronic obstructive pulmonary disease) (HCC), Depression, Hemorrhoids, Hypertension, and SOB (shortness of breath).     Surgical History: has a past surgical history that includes Appendectomy; orthopedic surgery (Left); Spine surgery; Joint replacement; and Nerve Surgery.     Family History: family history includes Heart disease in his father, maternal grandfather, and mother.     Social History: reports that he quit smoking about 5 years ago. His smoking use included cigarettes. He has a 50.00 pack-year smoking history. He has never used smokeless tobacco. He reports previous alcohol use. He reports that he does not use drugs.    Allergies: Penicillins          Current Outpatient Medications:   •  albuterol (ACCUNEB) 1.25 MG/3ML nebulizer solution, Take 1 ampule by nebulization Every 6 (Six) Hours As Needed., Disp: , Rfl:   •  amLODIPine (NORVASC) 5 MG tablet, Take 1 tablet by mouth Daily., Disp: 90 tablet, Rfl: 1  •  atorvastatin (LIPITOR) 10 MG tablet, Take 1 tablet by mouth Daily., Disp: 90 tablet, Rfl: 1  •  clonazePAM (KlonoPIN) 1 MG tablet, TAKE ONE TABLET BY MOUTH TWO TIMES A DAY, Disp: 60 tablet, Rfl: 0  •  Diclofenac  Sodium (VOLTAREN) 1 % gel gel, diclofenac 1 % topical gel  APPLY 2 GRAMS TO THE AFFECTED AREA(S) FOUR TIMES A DAY, Disp: , Rfl:   •  losartan (COZAAR) 50 MG tablet, TAKE ONE TABLET BY MOUTH TWO TIMES A DAY, Disp: 180 tablet, Rfl: 1  •  metoprolol succinate XL (TOPROL-XL) 50 MG 24 hr tablet, Take 3 tablets by mouth Daily., Disp: 90 tablet, Rfl: 5  •  nystatin (MYCOSTATIN) 866966 UNIT/GM powder, APPLY TO THE AFFECTED AREA(S) BY TOPICAL ROUTE 3 TIMES PER DAY, Disp: 60 g, Rfl: 2  •  oxyCODONE-acetaminophen (PERCOCET)  MG per tablet, TAKE 1 TABLET EVERY 8 HOURS BY ORAL ROUTE AS NEEDED FOR 30 DAYS., Disp: , Rfl:   •  pantoprazole (PROTONIX) 40 MG EC tablet, TAKE 1 TABLET (40 MG) BY ORAL ROUTE ONCE DAILY, Disp: 90 tablet, Rfl: 2  •  polyethylene glycol (MIRALAX) 17 g packet, Take as directed.  Instructions given in office.  Dispense: 238 g bottle, Disp: 238 packet, Rfl: 0  •  primidone (MYSOLINE) 50 MG tablet, Take 1 tablet by mouth 3 (Three) Times a Day., Disp: 270 tablet, Rfl: 1  •  FLUoxetine (PROzac) 20 MG capsule, Take 1 capsule by mouth Daily., Disp: 90 capsule, Rfl: 1  •  Fluticasone Furoate-Vilanterol (Breo Ellipta) 200-25 MCG/INH inhaler, Inhale 1 puff Daily for 30 days. Rinse mouth out after each use., Disp: 1 each, Rfl: 11  •  Tdap (Adacel) 5-2-15.5 LF-MCG/0.5 injection, Inject 0.5 mL into the appropriate muscle as directed by prescriber 1 (One) Time for 1 dose., Disp: 0.5 mL, Rfl: 0  •  tiotropium bromide monohydrate (Spiriva Respimat) 2.5 MCG/ACT aerosol solution inhaler, Inhale 2 puffs Daily for 30 days., Disp: 1 each, Rfl: 11  •  Zoster Vac Recomb Adjuvanted 50 MCG/0.5ML reconstituted suspension, Inject 0.5 mL into the appropriate muscle as directed by prescriber 1 (One) Time for 1 dose., Disp: 1 each, Rfl: 1    Medications Discontinued During This Encounter   Medication Reason   • amLODIPine (NORVASC) 10 MG tablet Duplicate order   • azithromycin (Zithromax Z-Harinder) 250 MG tablet *Therapy completed   •  "DULoxetine (CYMBALTA) 60 MG capsule *Therapy completed   • primidone (MYSOLINE) 50 MG tablet Reorder   • atorvastatin (LIPITOR) 10 MG tablet Reorder   • metoprolol succinate XL (TOPROL-XL) 50 MG 24 hr tablet Reorder   • amLODIPine (NORVASC) 5 MG tablet Duplicate order         Review of Systems   Constitutional: Negative for fever.   Respiratory: Negative for cough and shortness of breath.    Cardiovascular: Negative for chest pain, palpitations and leg swelling.   Neurological: Negative for dizziness, weakness, numbness and headache.        Objective         Vitals:    12/01/21 1237   BP: 162/82   BP Location: Right arm   Patient Position: Sitting   Cuff Size: Large Adult   Pulse: 70   Resp: 16   Temp: 98.6 °F (37 °C)   TempSrc: Infrared   SpO2: 95%   Weight: 101 kg (223 lb 6.4 oz)   Height: 182.9 cm (72\")     Body mass index is 30.3 kg/m².         Physical Exam  Vitals reviewed.   Constitutional:       Appearance: Normal appearance. He is well-developed.   HENT:      Head: Normocephalic and atraumatic.      Mouth/Throat:      Pharynx: No oropharyngeal exudate.   Eyes:      Conjunctiva/sclera: Conjunctivae normal.      Pupils: Pupils are equal, round, and reactive to light.   Cardiovascular:      Rate and Rhythm: Normal rate and regular rhythm.      Heart sounds: No murmur heard.  No friction rub. No gallop.    Pulmonary:      Effort: Pulmonary effort is normal.      Breath sounds: Normal breath sounds. No wheezing or rhonchi.   Skin:     General: Skin is warm and dry.   Neurological:      Mental Status: He is alert and oriented to person, place, and time.   Psychiatric:         Mood and Affect: Mood and affect normal.         Behavior: Behavior normal.         Thought Content: Thought content normal.         Judgment: Judgment normal.             Result Review :               Assessment and Plan     Diagnoses and all orders for this visit:    1. Need for shingles vaccine (Primary)  -     Zoster Vac Recomb " Adjuvanted 50 MCG/0.5ML reconstituted suspension; Inject 0.5 mL into the appropriate muscle as directed by prescriber 1 (One) Time for 1 dose.  Dispense: 1 each; Refill: 1    2. Need for Tdap vaccination  -     Tdap (Adacel) 5-2-15.5 LF-MCG/0.5 injection; Inject 0.5 mL into the appropriate muscle as directed by prescriber 1 (One) Time for 1 dose.  Dispense: 0.5 mL; Refill: 0    3. Mixed hyperlipidemia  -     atorvastatin (LIPITOR) 10 MG tablet; Take 1 tablet by mouth Daily.  Dispense: 90 tablet; Refill: 1    4. Essential hypertension  -     amLODIPine (NORVASC) 5 MG tablet; Take 1 tablet by mouth Daily.  Dispense: 90 tablet; Refill: 1  -     metoprolol succinate XL (TOPROL-XL) 50 MG 24 hr tablet; Take 3 tablets by mouth Daily.  Dispense: 90 tablet; Refill: 5  -     Lipid Panel w/ Chol/HDL Ratio; Future  -     Comprehensive metabolic panel; Future  -     Urinalysis With Culture If Indicated -; Future  -     CBC No Differential; Future    5. Moderate episode of recurrent major depressive disorder (HCC)  -     FLUoxetine (PROzac) 20 MG capsule; Take 1 capsule by mouth Daily.  Dispense: 90 capsule; Refill: 1    6. Tremor  -     primidone (MYSOLINE) 50 MG tablet; Take 1 tablet by mouth 3 (Three) Times a Day.  Dispense: 270 tablet; Refill: 1    7. Vitamin D deficiency  -     Vitamin D 25 hydroxy; Future              Follow Up     No follow-ups on file.    Patient was given instructions and counseling regarding his condition or for health maintenance advice. Please see specific information pulled into the AVS if appropriate.

## 2021-12-02 ENCOUNTER — CLINICAL SUPPORT (OUTPATIENT)
Dept: FAMILY MEDICINE CLINIC | Facility: CLINIC | Age: 71
End: 2021-12-02

## 2021-12-02 DIAGNOSIS — F41.9 ANXIETY: ICD-10-CM

## 2021-12-02 DIAGNOSIS — Z51.81 MEDICATION MONITORING ENCOUNTER: Primary | ICD-10-CM

## 2021-12-02 LAB
AMPHET+METHAMPHET UR QL: NEGATIVE
AMPHETAMINE INTERNAL CONTROL: ABNORMAL
AMPHETAMINES UR QL: NEGATIVE
BARBITURATE INTERNAL CONTROL: ABNORMAL
BARBITURATES UR QL SCN: NEGATIVE
BENZODIAZ UR QL SCN: POSITIVE
BENZODIAZEPINE INTERNAL CONTROL: ABNORMAL
BUPRENORPHINE INTERNAL CONTROL: ABNORMAL
BUPRENORPHINE SERPL-MCNC: NEGATIVE NG/ML
CANNABINOIDS SERPL QL: NEGATIVE
COCAINE INTERNAL CONTROL: ABNORMAL
COCAINE UR QL: NEGATIVE
MDMA (ECSTASY) INTERNAL CONTROL: ABNORMAL
MDMA UR QL SCN: NEGATIVE
METHADONE INTERNAL CONTROL: ABNORMAL
METHADONE UR QL SCN: NEGATIVE
METHAMPHETAMINE INTERNAL CONTROL: ABNORMAL
OPIATES INTERNAL CONTROL: ABNORMAL
OPIATES UR QL: NEGATIVE
OXYCODONE INTERNAL CONTROL: ABNORMAL
OXYCODONE UR QL SCN: POSITIVE
PCP UR QL SCN: NEGATIVE
PHENCYCLIDINE INTERNAL CONTROL: ABNORMAL
THC INTERNAL CONTROL: ABNORMAL

## 2021-12-02 PROCEDURE — 80305 DRUG TEST PRSMV DIR OPT OBS: CPT | Performed by: NURSE PRACTITIONER

## 2021-12-02 RX ORDER — CLONAZEPAM 1 MG/1
TABLET ORAL
Qty: 60 TABLET | Refills: 0 | Status: SHIPPED | OUTPATIENT
Start: 2021-12-02 | End: 2021-12-27

## 2021-12-09 ENCOUNTER — PATIENT OUTREACH (OUTPATIENT)
Dept: CASE MANAGEMENT | Facility: OTHER | Age: 71
End: 2021-12-09

## 2021-12-09 DIAGNOSIS — I10 ESSENTIAL HYPERTENSION: Primary | ICD-10-CM

## 2021-12-09 PROCEDURE — 99490 CHRNC CARE MGMT STAFF 1ST 20: CPT | Performed by: NURSE PRACTITIONER

## 2021-12-09 NOTE — OUTREACH NOTE
Ambulatory Case Management Note  CCM Interim Update    Reviewed chart prior to calling patient for outreach, he said that he has been doing okay. His last OV with PCP was 12/1, his BP was 162/82. Asked patient if he checks it at home and he says that he does, and it's up and down, but generally around 150's/70's maybe, he wasn't exactly sure. Asked patient to check his blood pressure every day for about 2.5 weeks, then will call and get his numbers. Patient voiced understanding. Also talked about not eating a lot of salt. He said that he has tried the salt substitute, and it was not good. Also talked about eating items that are low-fat, and eating whole grains. Also talked about how exercising can be beneficial as well. He said that he does exercise a little, but just can't do much because his back, and legs hurt so much. He does go to the pain clinic, and last OV was 11/1.    There are no recently modified care plans to display for this patient.    Sushila Zayas RN  Ambulatory Case Management  12/9/2021, 13:47 EST

## 2021-12-21 ENCOUNTER — PATIENT OUTREACH (OUTPATIENT)
Dept: CASE MANAGEMENT | Facility: OTHER | Age: 71
End: 2021-12-21

## 2021-12-21 DIAGNOSIS — J44.9 CHRONIC OBSTRUCTIVE PULMONARY DISEASE, UNSPECIFIED COPD TYPE (HCC): ICD-10-CM

## 2021-12-21 DIAGNOSIS — I10 ESSENTIAL HYPERTENSION: Primary | ICD-10-CM

## 2021-12-21 NOTE — OUTREACH NOTE
[facility] End of Month Documentation    This Chronic Medical Management Care Plan for Jose Rios, 71 y.o. male, has been monitored and managed; reviewed; revised and a new plan of care implemented for the month of December.  A cumulative time of 20  minutes was spent on this patient record this month, including phone call with patient; chart review.    Regarding the patient's problems: has Anemia; Anxiety disorder; Chronic obstructive pulmonary disease (HCC); CKD (chronic kidney disease) stage 3, GFR 30-59 ml/min (HCC); Essential hypertension; Hyperlipidemia; Hyponatremia; Major depressive disorder; MVP (mitral valve prolapse); JIM (obstructive sleep apnea); Osteoarthritis; Depression; Centrilobular emphysema (HCC); DDD (degenerative disc disease), cervical; Encounter for long-term (current) use of insulin (HCC); Spondylosis without myelopathy; Tremor; and Vitamin D deficiency on their problem list., the following items were addressed: medical records; medications and any changes can be found within the plan section of the note.  A detailed listing of time spent for chronic care management is tracked within each outreach encounter.  Current medications include:  has a current medication list which includes the following prescription(s): albuterol, amlodipine, atorvastatin, clonazepam, diclofenac sodium, fluoxetine, breo ellipta, losartan, metoprolol succinate xl, nystatin, oxycodone-acetaminophen, pantoprazole, polyethylene glycol, primidone, and spiriva respimat. and the patient is reported to be patient is compliant with medication protocol,  Medications are reported to be effective.  Regarding these diagnoses, referrals were made to the following provider(s): [facility].  All notes on chart for PCP to review.    The patient was monitored remotely for blood pressure; activity level; pain; medications; weight, Will call patient back in 2 weeks to see what his BP readings have been .    The patient's physical needs  include:  medication education; physical healthcare.     The patient's mental support needs include:  not applicable    The patient's cognitive support needs include:  medication; health care    The patient's psychosocial support needs include:  medication management or adherence    The patient's functional needs include: physical healthcare; medication education; physician referral    The patient's environmental needs include:  N/A    Care Plan overall comments:  N/A    Refer to previous outreach notes for more information on the areas listed above.    Monthly Billing Diagnoses  (I10) Essential hypertension    (J44.9) Chronic obstructive pulmonary disease, unspecified COPD type (HCC)    Medications   · Medications have been reconciled    Care Plan progress this month:      Recently Modified Care Plans Updates made since 11/20/2021 12:00 AM     Hypertension         Problem Priority Last Modified     SODIUM INTAKE --  7/7/2021  2:58 PM by Sushila Zayas RN              Goal Recent Progress Last Modified     Patient will maintain management of sodium consumption --  7/7/2021  2:58 PM by Sushila Zayas RN              Task Due Date Last Modified     Educate patient on daily sodium intake & how sodium affects the heart --  12/9/2021  2:00 PM by Sushila Zayas, RN        Instruct patient to eat less salt and watch fluids. No added salt or no more than 2 grams (2000mgs) of sodium or 2 liters of fluid in a 24-hour period, or follow provider's specific recommendations. --  12/9/2021  2:00 PM by Sushila Zayas RN        Educate patient on sodium alternatives --  12/9/2021  2:00 PM by Sushila Zayas, PRACHI              Problem Priority Last Modified     MEDICATION ADHERENCE --  7/7/2021  2:58 PM by Sushila Zayas RN              Goal Recent Progress Last Modified     Consistently take medications as prescribed --  7/7/2021  2:58 PM by Sushila Zayas RN              Task Due Date  Last Modified     Discuss barriers to medication adherence with patient --  12/9/2021  2:00 PM by Sushila Zayas RN        Assist patients in setting up daily notes/alarms for meds. Consider pill box use --  12/9/2021  2:00 PM by Sushila Zayas RN              Problem Priority Last Modified     PATIENT IS INACTIVE --  7/7/2021  2:58 PM by Sushila Zayas RN              Goal Recent Progress Last Modified     Exercise at least 20 minutes per day --  7/7/2021  2:58 PM by Sushila Zayas RN              Task Due Date Last Modified     Discuss barriers to activity with patient. Update SDOH. --  12/9/2021  2:00 PM by Sushila Zayas RN        Develop exercise plan with patient --  12/9/2021  2:00 PM by Sushila Zayas RN              Problem Priority Last Modified     PATIENT IS HYPERTENSIVE --  7/7/2021  2:58 PM by Sushila Zayas RN              Goal Recent Progress Last Modified     Remain at or Below Target Blood Pressure --  7/7/2021  2:58 PM by Sushila Zayas RN              Task Due Date Last Modified     Establish a target blood pressure with the patient in conjunction with PCP --  12/9/2021  2:00 PM by Sushila Zayas RN                    Instructions   · Patient was provided an electronic copy of care plan  · CCM services were explained and offered and patient has accepted these services.  · Patient has given their written consent to receive CCM services and understands that this includes the authorization of electronic communication of medical information with the other treating providers.  · Patient understands that they may stop CCM services at any time and these changes will be effective at the end of the calendar month and will effectively revocate the agreement of CCM services.  · Patient understands that only one practitioner can furnish and be paid for CCM services during one calendar month.  Patient also understands that there may be co-payment  or deductible fees in association with CCM services.  · Patient will continue with at least monthly follow-up calls with the Nurse Navigator.    Sushila Zayas RN  Ambulatory Case Management    12/21/2021, 15:04 EST

## 2021-12-27 ENCOUNTER — TELEPHONE (OUTPATIENT)
Dept: FAMILY MEDICINE CLINIC | Facility: CLINIC | Age: 71
End: 2021-12-27

## 2021-12-27 DIAGNOSIS — F41.9 ANXIETY: ICD-10-CM

## 2021-12-27 RX ORDER — CLONAZEPAM 1 MG/1
TABLET ORAL
Qty: 60 TABLET | Refills: 0 | Status: SHIPPED | OUTPATIENT
Start: 2021-12-27 | End: 2022-01-26

## 2021-12-27 NOTE — TELEPHONE ENCOUNTER
Caller: Jose Rios    Relationship: Self    Best call back number: [phone]    What medications are you currently taking:   Current Outpatient Medications on File Prior to Visit   Medication Sig Dispense Refill   • albuterol (ACCUNEB) 1.25 MG/3ML nebulizer solution Take 1 ampule by nebulization Every 6 (Six) Hours As Needed.     • amLODIPine (NORVASC) 5 MG tablet Take 1 tablet by mouth Daily. 90 tablet 1   • atorvastatin (LIPITOR) 10 MG tablet Take 1 tablet by mouth Daily. 90 tablet 1   • clonazePAM (KlonoPIN) 1 MG tablet TAKE ONE TABLET BY MOUTH TWO TIMES A DAY 60 tablet 0   • Diclofenac Sodium (VOLTAREN) 1 % gel gel diclofenac 1 % topical gel   APPLY 2 GRAMS TO THE AFFECTED AREA(S) FOUR TIMES A DAY     • FLUoxetine (PROzac) 20 MG capsule Take 1 capsule by mouth Daily. 90 capsule 1   • Fluticasone Furoate-Vilanterol (Breo Ellipta) 200-25 MCG/INH inhaler Inhale 1 puff Daily for 30 days. Rinse mouth out after each use. 1 each 11   • losartan (COZAAR) 50 MG tablet TAKE ONE TABLET BY MOUTH TWO TIMES A  tablet 1   • metoprolol succinate XL (TOPROL-XL) 50 MG 24 hr tablet Take 3 tablets by mouth Daily. 90 tablet 5   • nystatin (MYCOSTATIN) 061135 UNIT/GM powder APPLY TO THE AFFECTED AREA(S) BY TOPICAL ROUTE 3 TIMES PER DAY 60 g 2   • oxyCODONE-acetaminophen (PERCOCET)  MG per tablet TAKE 1 TABLET EVERY 8 HOURS BY ORAL ROUTE AS NEEDED FOR 30 DAYS.     • pantoprazole (PROTONIX) 40 MG EC tablet TAKE 1 TABLET (40 MG) BY ORAL ROUTE ONCE DAILY 90 tablet 2   • polyethylene glycol (MIRALAX) 17 g packet Take as directed.  Instructions given in office.  Dispense: 238 g bottle 238 packet 0   • primidone (MYSOLINE) 50 MG tablet Take 1 tablet by mouth 3 (Three) Times a Day. 270 tablet 1   • tiotropium bromide monohydrate (Spiriva Respimat) 2.5 MCG/ACT aerosol solution inhaler Inhale 2 puffs Daily for 30 days. 1 each 11   • [DISCONTINUED] clonazePAM (KlonoPIN) 1 MG tablet TAKE ONE TABLET BY MOUTH TWO TIMES A  DAY 60 tablet 0     No current facility-administered medications on file prior to visit.          When did you start taking these medications: 3 WEEKS TO A MONTH     Which medication are you concerned about: FLUoxetine (PROzac) 20 MG capsule    Who prescribed you this medication: PCP     What are your concerns:PATIENT STATES THAT THIS MEDICATION IS NOT WORKING. STATES THAT HE FEELS LIKE IT IS MAKING HIM MORE DEPRESSED.    How long have you had these concerns: PATIENT STATES THAT HE HAS HAD THESE CONCERNS FOR THE PAST COUPLE OF WEEKS.    PATIENT IS REQUESTING SOME OTHER TYPE OF MEDICATION.    PREFERRED PHARMACY:[facility]  [place], [place] - [address] [place] [address], [facility] [address] - [phone]  - [phone] FX        PLEASE ADVISE PATIENT. THANKS.

## 2021-12-29 NOTE — TELEPHONE ENCOUNTER
Spoke with patient and he states he can't recall a medication he has took that worked for him. I ask him about psych and he would rather get a appointment here. So he has a telehealth appointment on 1/5 at 7:00am due to not being able to get around good anymore.

## 2021-12-29 NOTE — TELEPHONE ENCOUNTER
Can send to get evaluated by Psych to determine what may be best medication.  He has been on others in the past.  If would like can come back into the office to discuss.  Or if he remembers one that worked better can change back to the one he knows helped in the past.

## 2021-12-30 ENCOUNTER — TELEPHONE (OUTPATIENT)
Dept: FAMILY MEDICINE CLINIC | Facility: CLINIC | Age: 71
End: 2021-12-30

## 2021-12-30 RX ORDER — VIT C/B6/B5/MAGNESIUM/HERB 173 50-5-6-5MG
1000 CAPSULE ORAL DAILY
COMMUNITY

## 2021-12-30 RX ORDER — CHLORPHENIRAMINE MALEATE 4 MG/1
4 TABLET ORAL EVERY 6 HOURS PRN
COMMUNITY

## 2021-12-30 NOTE — TELEPHONE ENCOUNTER
Is he taking the amlodipine still 5mg daily.  If so increase to 10mg and continue to monitor blood pressure but if starts have chest pain to go to ED.

## 2021-12-30 NOTE — TELEPHONE ENCOUNTER
Pt is requesting to speak with someone he is experiencing high blood pressure, stated his bp is running 180/90 - 190/90

## 2022-01-04 ENCOUNTER — ANESTHESIA EVENT (OUTPATIENT)
Dept: GASTROENTEROLOGY | Facility: HOSPITAL | Age: 72
End: 2022-01-04

## 2022-01-04 ENCOUNTER — ANESTHESIA (OUTPATIENT)
Dept: GASTROENTEROLOGY | Facility: HOSPITAL | Age: 72
End: 2022-01-04

## 2022-01-04 ENCOUNTER — HOSPITAL ENCOUNTER (OUTPATIENT)
Facility: HOSPITAL | Age: 72
Setting detail: HOSPITAL OUTPATIENT SURGERY
Discharge: HOME | End: 2022-01-04
Attending: SURGERY | Admitting: SURGERY

## 2022-01-04 VITALS
OXYGEN SATURATION: 97 % | TEMPERATURE: 97.8 F | DIASTOLIC BLOOD PRESSURE: 65 MMHG | RESPIRATION RATE: 11 BRPM | BODY MASS INDEX: 30.16 KG/M2 | HEART RATE: 60 BPM | WEIGHT: 222.66 LBS | HEIGHT: 72 IN | SYSTOLIC BLOOD PRESSURE: 105 MMHG

## 2022-01-04 DIAGNOSIS — Z12.11 SCREENING FOR MALIGNANT NEOPLASM OF COLON: ICD-10-CM

## 2022-01-04 PROBLEM — Z79.4 ENCOUNTER FOR LONG-TERM (CURRENT) USE OF INSULIN: Status: RESOLVED | Noted: 2018-10-30 | Resolved: 2022-01-04

## 2022-01-04 PROCEDURE — 25010000002 PROPOFOL 10 MG/ML EMULSION: Performed by: NURSE ANESTHETIST, CERTIFIED REGISTERED

## 2022-01-04 RX ORDER — SODIUM CHLORIDE, SODIUM LACTATE, POTASSIUM CHLORIDE, CALCIUM CHLORIDE 600; 310; 30; 20 MG/100ML; MG/100ML; MG/100ML; MG/100ML
30 INJECTION, SOLUTION INTRAVENOUS CONTINUOUS
Status: DISCONTINUED | OUTPATIENT
Start: 2022-01-04 | End: 2022-01-04 | Stop reason: HOSPADM

## 2022-01-04 RX ORDER — SODIUM CHLORIDE 0.9 % (FLUSH) 0.9 %
3 SYRINGE (ML) INJECTION EVERY 12 HOURS SCHEDULED
Status: DISCONTINUED | OUTPATIENT
Start: 2022-01-04 | End: 2022-01-04 | Stop reason: HOSPADM

## 2022-01-04 RX ORDER — PROPOFOL 10 MG/ML
VIAL (ML) INTRAVENOUS AS NEEDED
Status: DISCONTINUED | OUTPATIENT
Start: 2022-01-04 | End: 2022-01-04 | Stop reason: SURG

## 2022-01-04 RX ORDER — LIDOCAINE HYDROCHLORIDE 20 MG/ML
INJECTION, SOLUTION INFILTRATION; PERINEURAL AS NEEDED
Status: DISCONTINUED | OUTPATIENT
Start: 2022-01-04 | End: 2022-01-04 | Stop reason: SURG

## 2022-01-04 RX ORDER — SODIUM CHLORIDE 0.9 % (FLUSH) 0.9 %
10 SYRINGE (ML) INJECTION AS NEEDED
Status: DISCONTINUED | OUTPATIENT
Start: 2022-01-04 | End: 2022-01-04 | Stop reason: HOSPADM

## 2022-01-04 RX ADMIN — LIDOCAINE HYDROCHLORIDE 60 MG: 20 INJECTION, SOLUTION INFILTRATION; PERINEURAL at 14:45

## 2022-01-04 RX ADMIN — PROPOFOL 200 MCG/KG/MIN: 10 INJECTION, EMULSION INTRAVENOUS at 14:44

## 2022-01-04 RX ADMIN — PROPOFOL 60 MG: 10 INJECTION, EMULSION INTRAVENOUS at 14:45

## 2022-01-04 RX ADMIN — SODIUM CHLORIDE, POTASSIUM CHLORIDE, SODIUM LACTATE AND CALCIUM CHLORIDE 30 ML/HR: 600; 310; 30; 20 INJECTION, SOLUTION INTRAVENOUS at 13:54

## 2022-01-04 NOTE — ANESTHESIA PREPROCEDURE EVALUATION
Anesthesia Evaluation     Patient summary reviewed and Nursing notes reviewed   no history of anesthetic complications:  NPO Solid Status: > 8 hours  NPO Liquid Status: > 2 hours           Airway   Mallampati: IV  TM distance: >3 FB  Difficult intubation highly probable  Dental    (+) edentulous    Pulmonary - normal exam   (+) pneumonia , COPD, shortness of breath, sleep apnea,     ROS comment: Chronic bronchitis  emphysema  Cardiovascular - normal exam  Exercise tolerance: poor (<4 METS)    (+) hypertension, valvular problems/murmurs MVP, VELAZQUEZ, hyperlipidemia,       Neuro/Psych  (+) headaches, tremors, numbness, psychiatric history Depression, Bipolar and Anxiety,       ROS Comment: Hx of head injury  Slurred speech  GI/Hepatic/Renal/Endo    (+)  GERD,  renal disease CRI,     Musculoskeletal     (+) neck pain,   Abdominal   (+) obese,    Substance History - negative use     OB/GYN negative ob/gyn ROS         Other   arthritis, blood dyscrasia anemia,                   Anesthesia Plan    ASA 4     general   (Total IV Anesthesia    Patient understands anesthesia not responsible for dental damage.  )  intravenous induction     Anesthetic plan, all risks, benefits, and alternatives have been provided, discussed and informed consent has been obtained with: patient.    Plan discussed with CRNA.

## 2022-01-04 NOTE — DISCHARGE INSTRUCTIONS
Dr. Ash's Instructions       • Next colonoscopy in 10 years.    • Follow-up with your primary care clinician at your next scheduled appointment time.

## 2022-01-04 NOTE — H&P
Chief Complaint: No chief complaint on file.    Subjective      Colonoscopy consultation       History of Present Illness  Jose Rios is a 71 y.o. male presents to [facility] for colonoscopy consultation.    Patient presents today on referral from Jagdeep Harrison for colonoscopy consultation.    Patient denies any abdominal pain, change in bowel habit, rectal bleeding.    Denies any family history of colorectal cancer.    No previous colonoscopy.    Objective     Past Medical History:   Diagnosis Date   • Acid reflux disease    • Anemia 10/24/2016   • Anxiety    • Anxiety disorder 09/21/2017    HE STATES THAT HIS ANXIETY HAS BEEN WORSE. HE HAS GONE TO [facility] IN THE PAST AND TRIED SSRI'S W/O MUCH BENEFIT. HE STATES THAT KLONOPIN HELPED, BUT STOPPED GOING AND THEN STOPPED KLONOPIN. RESTART KLONOPIN.    • Arthritis    • Arthritis     GENERALIZED  R KNEE   • Bipolar affect, depressed ([facility])    • Bipolar disorder ([facility])    • Cervical radiculopathy 12/15/2015   • Cervical spinal stenosis 12/15/2015   • Cervicalgia 04/03/2018   • Chronic bronchitis ([facility])    • Chronic pain of both knees 02/02/2018   • CKD (chronic kidney disease) stage 3, GFR 30-59 ml/min ([facility]) 02/02/2018   • COPD (chronic obstructive pulmonary disease) ([facility])    • COPD (chronic obstructive pulmonary disease) ([facility])     USES INHALERS   • Depression    • Esophageal reflux    • Essential hypertension 07/29/2019   • Folic acid deficiency 11/02/2017   • Foraminal stenosis of cervical region 05/10/2018   • Gastric reflux    • Head injury    • Hemorrhoids     RECTAL PROB (ACID REFLUX)   • Herniated disc, cervical 10/21/2014    C5-6, RIGHT   • Hyperlipidemia    • Hypertension    • Hypertension     CONTROLLED WITH MEDS   • Hyponatremia 10/03/2016, 11/9/2015   • Limb swelling    • Lung disease    • Major depressive disorder 07/29/2019   • Mediastinal adenopathy 10/03/2016   • Migraine    • MVP (mitral valve prolapse) 10/07/2015   •  JIM (obstructive sleep apnea) 10/03/2016   • Osteoarthritis 07/29/2019   • Pneumonia    • Shortness of breath    • Slurred speech 12/01/2016   • SOB (shortness of breath)    • Tremor of right hand 10/07/2015   • Ulnar neuritis 10/18/2018    CLINICAL       Past Surgical History:   Procedure Laterality Date   • APPENDECTOMY     • APPENDECTOMY      45-50 YRS AGO   • BACK SURGERY     • CERVICAL DISC SURGERY  1996    INTERVERTEBRAL; C5-6 FUSION   • CERVICAL FUSION  11/05/2014, 3/11/2015    C5-6, C6-7   • JOINT REPLACEMENT      KNEE RIGHT   • NERVE SURGERY      LEFT ARM   • ORTHOPEDIC SURGERY Left     MIDDLE TRIGGER FINGER SX rtk)   • OTHER SURGICAL HISTORY Left     ARM SURGERY-NERVE DAMAGE REPAIR LEFT ARM    • SPINE SURGERY      SURGERY ON C-5-C6 IN 1996 AND 1999)       No current facility-administered medications for this encounter.    Current Outpatient Medications:   •  albuterol (ACCUNEB) 1.25 MG/3ML nebulizer solution, Take 1 ampule by nebulization Every 6 (Six) Hours As Needed., Disp: , Rfl:   •  amLODIPine (NORVASC) 5 MG tablet, Take 1 tablet by mouth Daily., Disp: 90 tablet, Rfl: 1  •  atorvastatin (LIPITOR) 10 MG tablet, Take 1 tablet by mouth Daily., Disp: 90 tablet, Rfl: 1  •  chlorpheniramine (CHLOR-TRIMETON) 4 MG tablet, Take 4 mg by mouth Every 6 (Six) Hours As Needed for Allergies., Disp: , Rfl:   •  clonazePAM (KlonoPIN) 1 MG tablet, TAKE ONE TABLET BY MOUTH TWO TIMES A DAY, Disp: 60 tablet, Rfl: 0  •  Diclofenac Sodium (VOLTAREN) 1 % gel gel, diclofenac 1 % topical gel  APPLY 2 GRAMS TO THE AFFECTED AREA(S) FOUR TIMES A DAY, Disp: , Rfl:   •  losartan (COZAAR) 50 MG tablet, TAKE ONE TABLET BY MOUTH TWO TIMES A DAY, Disp: 180 tablet, Rfl: 1  •  metoprolol succinate XL (TOPROL-XL) 50 MG 24 hr tablet, Take 3 tablets by mouth Daily. (Patient taking differently: Take 50 mg by mouth Daily. Per Patient, takes a 100mg tab and a 50mg tab in the am.), Disp: 90 tablet, Rfl: 5  •  nystatin (MYCOSTATIN) 197735  "UNIT/GM powder, APPLY TO THE AFFECTED AREA(S) BY TOPICAL ROUTE 3 TIMES PER DAY, Disp: 60 g, Rfl: 2  •  oxyCODONE-acetaminophen (PERCOCET)  MG per tablet, TAKE 1 TABLET EVERY 8 HOURS BY ORAL ROUTE AS NEEDED FOR 30 DAYS., Disp: , Rfl:   •  pantoprazole (PROTONIX) 40 MG EC tablet, TAKE 1 TABLET (40 MG) BY ORAL ROUTE ONCE DAILY, Disp: 90 tablet, Rfl: 2  •  primidone (MYSOLINE) 50 MG tablet, Take 1 tablet by mouth 3 (Three) Times a Day., Disp: 270 tablet, Rfl: 1  •  tiotropium bromide monohydrate (Spiriva Respimat) 2.5 MCG/ACT aerosol solution inhaler, Inhale 2 puffs Daily for 30 days., Disp: 1 each, Rfl: 11  •  Turmeric 500 MG capsule, Take 1,000 mg by mouth Daily., Disp: , Rfl:   •  Fluticasone Furoate-Vilanterol (Breo Ellipta) 200-25 MCG/INH inhaler, Inhale 1 puff Daily for 30 days. Rinse mouth out after each use., Disp: 1 each, Rfl: 11  •  polyethylene glycol (MIRALAX) 17 g packet, Take as directed.  Instructions given in office.  Dispense: 238 g bottle, Disp: 238 packet, Rfl: 0    Allergies   Allergen Reactions   • Penicillins Swelling        Family History   Problem Relation Age of Onset   • Heart disease Mother    • Stroke Mother    • Arthritis Mother    • Heart disease Father    • Heart disease Maternal Grandfather    • Cancer Brother    • Malig Hyperthermia Neg Hx        Social History     Socioeconomic History   • Marital status:    Tobacco Use   • Smoking status: Former Smoker     Packs/day: 1.00     Years: 50.00     Pack years: 50.00     Types: Cigarettes     Quit date: 2016     Years since quittin.4   • Smokeless tobacco: Never Used   Vaping Use   • Vaping Use: Never used   Substance and Sexual Activity   • Alcohol use: Yes     Comment: 1 or 2 a amonth    • Drug use: Never   • Sexual activity: Defer     Partners: Female       Vital Signs:   Ht 182.9 cm (72\")   Wt 93 kg (205 lb)   BMI 27.80 kg/m²      Physical Exam  Constitutional:       Appearance: Normal appearance.   HENT:      Head: " Normocephalic.   Cardiovascular:      Rate and Rhythm: Normal rate.   Pulmonary:      Effort: Pulmonary effort is normal.   Abdominal:      General: Abdomen is flat.      Palpations: Abdomen is soft.   Skin:     General: Skin is warm and dry.   Neurological:      General: No focal deficit present.      Mental Status: He is alert and oriented to person, place, and time.   Psychiatric:         Mood and Affect: Mood normal.         Thought Content: Thought content normal.               Assessment   Screening colonoscopy    Plan    Colonoscopy    Risks and benefits discussed    Electronically signed by Parviz Ash MD, 01/04/22, 12:40 PM EST.

## 2022-01-04 NOTE — ANESTHESIA POSTPROCEDURE EVALUATION
Patient: Jose Rios    Procedure Summary     Date: 01/04/22 Room / Location: [facility] ENDOSCOPY 2 / [facility] ENDOSCOPY    Anesthesia Start: 1436 Anesthesia Stop: 1508    Procedure: COLONOSCOPY (N/A ) Diagnosis:       Screening for malignant neoplasm of colon      (Screening for malignant neoplasm of colon [Z12.11])    Surgeons: Parviz Ash MD Provider: Miryam Tran DO    Anesthesia Type: general ASA Status: 4          Anesthesia Type: general    Vitals  Vitals Value Taken Time   /70 01/04/22 1531   Temp 36.6 °C (97.8 °F) 01/04/22 1531   Pulse 57 01/04/22 1533   Resp 11 01/04/22 1531   SpO2 95 % 01/04/22 1533   Vitals shown include unvalidated device data.        Post Anesthesia Care and Evaluation    Patient location during evaluation: bedside  Patient participation: complete - patient participated  Level of consciousness: awake  Pain management: adequate  Airway patency: patent  Anesthetic complications: No anesthetic complications  PONV Status: none  Cardiovascular status: acceptable and stable  Respiratory status: acceptable  Hydration status: acceptable    Comments: An Anesthesiologist personally participated in the most demanding procedures (including induction and emergence if applicable) in the anesthesia plan, monitored the course of anesthesia administration at frequent intervals and remained physically present and available for immediate diagnosis and treatment of emergencies.

## 2022-01-05 ENCOUNTER — TELEMEDICINE (OUTPATIENT)
Dept: FAMILY MEDICINE CLINIC | Facility: CLINIC | Age: 72
End: 2022-01-05

## 2022-01-05 DIAGNOSIS — I10 ESSENTIAL HYPERTENSION: Primary | ICD-10-CM

## 2022-01-05 PROCEDURE — 99213 OFFICE O/P EST LOW 20 MIN: CPT | Performed by: NURSE PRACTITIONER

## 2022-01-05 RX ORDER — ALBUTEROL SULFATE 90 UG/1
AEROSOL, METERED RESPIRATORY (INHALATION)
COMMUNITY
Start: 2021-12-27 | End: 2022-05-26 | Stop reason: SDUPTHER

## 2022-01-05 NOTE — PROGRESS NOTES
Chief Complaint  Hypertension (f/u video visit)    Subjective        Jose Rios presents to [facility]  Has been elevated with Blood pressure being elevated at 210/105.    Blood pressure today at home is 170/80.  Was 106/60 yesterday during colonoscopy.  Taking 150mg toprol, losartan, amlodipine 10mg.        Past History:    Medical History: has a past medical history of Acid reflux disease, Anemia (10/24/2016), Anxiety, Anxiety disorder (09/21/2017), Arthritis, Arthritis, Bipolar affect, depressed ([facility]), Bipolar disorder ([facility]), Cervical radiculopathy (12/15/2015), Cervical spinal stenosis (12/15/2015), Cervicalgia (04/03/2018), Chronic bronchitis ([facility]), Chronic pain of both knees (02/02/2018), CKD (chronic kidney disease) stage 3, GFR 30-59 ml/min ([facility]) (02/02/2018), COPD (chronic obstructive pulmonary disease) ([facility]), COPD (chronic obstructive pulmonary disease) ([facility]), Depression, Esophageal reflux, Essential hypertension (07/29/2019), Folic acid deficiency (11/02/2017), Foraminal stenosis of cervical region (05/10/2018), Gastric reflux, Head injury, Hemorrhoids, Herniated disc, cervical (10/21/2014), Hyperlipidemia, Hypertension, Hypertension, Hyponatremia (10/03/2016, 11/9/2015), Limb swelling, Lung disease, Major depressive disorder (07/29/2019), Mediastinal adenopathy (10/03/2016), Migraine, MVP (mitral valve prolapse) (10/07/2015), JIM (obstructive sleep apnea) (10/03/2016), Osteoarthritis (07/29/2019), Pneumonia, Shortness of breath, Slurred speech (12/01/2016), SOB (shortness of breath), Tremor of right hand (10/07/2015), and Ulnar neuritis (10/18/2018).     Surgical History: has a past surgical history that includes Appendectomy; Other surgical history (Left); Back surgery; Cervical fusion (11/05/2014, 3/11/2015); Cervical disc surgery (1996); Appendectomy; orthopedic surgery (Left); Spine surgery; Joint replacement; and Nerve Surgery.     Family History: family  history includes Arthritis in his mother; Cancer in his brother; Heart disease in his father, maternal grandfather, and mother; Stroke in his mother.     Social History: reports that he quit smoking about 5 years ago. His smoking use included cigarettes. He has a 50.00 pack-year smoking history. He has never used smokeless tobacco. He reports current alcohol use. He reports that he does not use drugs.    Allergies: Penicillins          Current Outpatient Medications:   •  albuterol (ACCUNEB) 1.25 MG/3ML nebulizer solution, Take 1 ampule by nebulization Every 6 (Six) Hours As Needed., Disp: , Rfl:   •  albuterol sulfate  (90 Base) MCG/ACT inhaler, INHALE 2 PUFFS EVERY 6 (SIX) HOURS AS NEEDED FOR WHEEZING OR SHORTNESS OF AIR FOR UP TO 30 DAYS., Disp: , Rfl:   •  amLODIPine (NORVASC) 5 MG tablet, Take 1 tablet by mouth Daily., Disp: 90 tablet, Rfl: 1  •  atorvastatin (LIPITOR) 10 MG tablet, Take 1 tablet by mouth Daily., Disp: 90 tablet, Rfl: 1  •  chlorpheniramine (CHLOR-TRIMETON) 4 MG tablet, Take 4 mg by mouth Every 6 (Six) Hours As Needed for Allergies., Disp: , Rfl:   •  clonazePAM (KlonoPIN) 1 MG tablet, TAKE ONE TABLET BY MOUTH TWO TIMES A DAY, Disp: 60 tablet, Rfl: 0  •  Diclofenac Sodium (VOLTAREN) 1 % gel gel, diclofenac 1 % topical gel  APPLY 2 GRAMS TO THE AFFECTED AREA(S) FOUR TIMES A DAY, Disp: , Rfl:   •  losartan (COZAAR) 50 MG tablet, TAKE ONE TABLET BY MOUTH TWO TIMES A DAY, Disp: 180 tablet, Rfl: 1  •  metoprolol succinate XL (TOPROL-XL) 50 MG 24 hr tablet, Take 3 tablets by mouth Daily. (Patient taking differently: Take 50 mg by mouth Daily. Per Patient, takes a 100mg tab and a 50mg tab in the am.), Disp: 90 tablet, Rfl: 5  •  nystatin (MYCOSTATIN) 488499 UNIT/GM powder, APPLY TO THE AFFECTED AREA(S) BY TOPICAL ROUTE 3 TIMES PER DAY, Disp: 60 g, Rfl: 2  •  oxyCODONE-acetaminophen (PERCOCET)  MG per tablet, TAKE 1 TABLET EVERY 8 HOURS BY ORAL ROUTE AS NEEDED FOR 30 DAYS., Disp: , Rfl:    •  pantoprazole (PROTONIX) 40 MG EC tablet, TAKE 1 TABLET (40 MG) BY ORAL ROUTE ONCE DAILY, Disp: 90 tablet, Rfl: 2  •  polyethylene glycol (MIRALAX) 17 g packet, Take as directed.  Instructions given in office.  Dispense: 238 g bottle, Disp: 238 packet, Rfl: 0  •  primidone (MYSOLINE) 50 MG tablet, Take 1 tablet by mouth 3 (Three) Times a Day., Disp: 270 tablet, Rfl: 1  •  Turmeric 500 MG capsule, Take 1,000 mg by mouth Daily., Disp: , Rfl:   •  Fluticasone Furoate-Vilanterol (Breo Ellipta) 200-25 MCG/INH inhaler, Inhale 1 puff Daily for 30 days. Rinse mouth out after each use., Disp: 1 each, Rfl: 11  •  tiotropium bromide monohydrate (Spiriva Respimat) 2.5 MCG/ACT aerosol solution inhaler, Inhale 2 puffs Daily for 30 days., Disp: 1 each, Rfl: 11  No current facility-administered medications for this visit.    There are no discontinued medications.      Review of Systems   Constitutional: Negative for fever.   Respiratory: Negative for cough and shortness of breath.    Cardiovascular: Negative for chest pain, palpitations and leg swelling.   Neurological: Negative for dizziness, weakness, numbness and headache.        Objective         There were no vitals filed for this visit.  There is no height or weight on file to calculate BMI.         Physical Exam  Constitutional:       Appearance: Normal appearance. He is normal weight.   HENT:      Head: Normocephalic.   Pulmonary:      Effort: Pulmonary effort is normal.   Neurological:      Mental Status: He is alert.   Psychiatric:         Mood and Affect: Mood normal.         Behavior: Behavior normal.         Thought Content: Thought content normal.         Judgment: Judgment normal.             Result Review :               Assessment and Plan     Diagnoses and all orders for this visit:    1. Essential hypertension (Primary)  Comments:  patient to come by and bring machine from home to check with blood pressure here since had a low blood pressure yesterday.  Will  adjust medication if needed.        Video visit from office with patient at home lasting 15minutes      Follow Up     Return if symptoms worsen or fail to improve, for Next scheduled follow up.    Patient was given instructions and counseling regarding his condition or for health maintenance advice. Please see specific information pulled into the AVS if appropriate.

## 2022-01-12 ENCOUNTER — CLINICAL SUPPORT (OUTPATIENT)
Dept: FAMILY MEDICINE CLINIC | Facility: CLINIC | Age: 72
End: 2022-01-12

## 2022-01-12 DIAGNOSIS — I15.9 SECONDARY HYPERTENSION: ICD-10-CM

## 2022-01-12 PROCEDURE — 99211 OFF/OP EST MAY X REQ PHY/QHP: CPT | Performed by: NURSE PRACTITIONER

## 2022-01-12 NOTE — PROGRESS NOTES
Patient came into the office to check his blood pressure machine to make sure it was functioning properly.   The manual reading was 163/92 and his blood pressure machine reading was 162/92.

## 2022-01-25 DIAGNOSIS — F41.9 ANXIETY: ICD-10-CM

## 2022-01-25 RX ORDER — METOPROLOL SUCCINATE 100 MG/1
TABLET, EXTENDED RELEASE ORAL
Qty: 90 TABLET | Refills: 1 | Status: SHIPPED | OUTPATIENT
Start: 2022-01-25 | End: 2022-03-02 | Stop reason: SDUPTHER

## 2022-01-26 RX ORDER — CLONAZEPAM 1 MG/1
TABLET ORAL
Qty: 60 TABLET | Refills: 0 | Status: SHIPPED | OUTPATIENT
Start: 2022-01-26 | End: 2022-02-23

## 2022-02-22 DIAGNOSIS — F41.9 ANXIETY: ICD-10-CM

## 2022-02-23 RX ORDER — CLONAZEPAM 1 MG/1
TABLET ORAL
Qty: 60 TABLET | Refills: 0 | Status: SHIPPED | OUTPATIENT
Start: 2022-02-23 | End: 2022-03-17

## 2022-03-01 ENCOUNTER — PATIENT OUTREACH (OUTPATIENT)
Dept: CASE MANAGEMENT | Facility: OTHER | Age: 72
End: 2022-03-01

## 2022-03-01 DIAGNOSIS — I10 ESSENTIAL HYPERTENSION: Primary | ICD-10-CM

## 2022-03-01 PROCEDURE — 99490 CHRNC CARE MGMT STAFF 1ST 20: CPT | Performed by: NURSE PRACTITIONER

## 2022-03-01 NOTE — OUTREACH NOTE
Ambulatory Case Management Note  CCM Interim Update    Reviewed chart prior to calling patient for outreach. Patient said that he was doing alright. Reminded him about his appointment with PCP tomorrow, and that he has missed 2 appointments with his pulmonologist, he has not seen them since June 2021. He said that he forgot about those but knows its important and needs to call and reschedule that. I called and got him another appointment and patient is aware.   He said that his Colonoscopy went well and it was negative.   Asked patient how his BP had been and he said 150's-160's/80's. He has an appointment with his PCP tomorrow and told him to take his blood pressure log with him so that she can see what it has been at home. He did bring his machine in and compared and they were about the same.   No other questions or needs at this time      Care Plan: Hypertension   Updates made since 3/1/2022 12:00 AM      Problem: MEDICATION ADHERENCE       Goal: Consistently take medications as prescribed       Task: Discuss barriers to medication adherence with patient Completed 3/1/2022   Responsible User: Sushila Zayas, RN      Problem: PATIENT IS HYPERTENSIVE       Goal: Remain at or Below Target Blood Pressure       Task: Educate on disease process and complications associated with noncompliance Completed 3/1/2022   Responsible User: Sushila Zayas, RN        Sushila Zayas, RN  Ambulatory Case Management  3/1/2022, 13:34 EST

## 2022-03-02 ENCOUNTER — OFFICE VISIT (OUTPATIENT)
Dept: FAMILY MEDICINE CLINIC | Facility: CLINIC | Age: 72
End: 2022-03-02

## 2022-03-02 VITALS
SYSTOLIC BLOOD PRESSURE: 150 MMHG | OXYGEN SATURATION: 96 % | DIASTOLIC BLOOD PRESSURE: 86 MMHG | RESPIRATION RATE: 16 BRPM | WEIGHT: 229 LBS | TEMPERATURE: 98.4 F | HEART RATE: 59 BPM | HEIGHT: 72 IN | BODY MASS INDEX: 31.02 KG/M2

## 2022-03-02 DIAGNOSIS — E78.2 MIXED HYPERLIPIDEMIA: ICD-10-CM

## 2022-03-02 DIAGNOSIS — E55.9 VITAMIN D DEFICIENCY: ICD-10-CM

## 2022-03-02 DIAGNOSIS — I10 ESSENTIAL HYPERTENSION: Primary | ICD-10-CM

## 2022-03-02 DIAGNOSIS — F41.9 ANXIETY DISORDER, UNSPECIFIED TYPE: ICD-10-CM

## 2022-03-02 PROCEDURE — 99214 OFFICE O/P EST MOD 30 MIN: CPT | Performed by: NURSE PRACTITIONER

## 2022-03-02 RX ORDER — METOPROLOL SUCCINATE 200 MG/1
100 TABLET, EXTENDED RELEASE ORAL DAILY
Qty: 90 TABLET | Refills: 1 | Status: SHIPPED | OUTPATIENT
Start: 2022-03-02 | End: 2022-03-30 | Stop reason: SDUPTHER

## 2022-03-02 NOTE — PROGRESS NOTES
Chief Complaint  Anxiety (f/u), Hyperlipidemia (f/uf/u), Hypertension (f/u), and Heartburn (f/u)    Subjective        Jose Rios presents to [facility]  Hypertension:  On amlodipine 5mg and cozaar 100mg a day as well as 150mg metoprolol.        Past History:    Medical History: has a past medical history of Acid reflux disease, Anemia (10/24/2016), Anxiety, Anxiety disorder (09/21/2017), Arthritis, Arthritis, Bipolar affect, depressed ([facility]), Bipolar disorder ([facility]), Cervical radiculopathy (12/15/2015), Cervical spinal stenosis (12/15/2015), Cervicalgia (04/03/2018), Chronic bronchitis ([facility]), Chronic pain of both knees (02/02/2018), CKD (chronic kidney disease) stage 3, GFR 30-59 ml/min ([facility]) (02/02/2018), COPD (chronic obstructive pulmonary disease) ([facility]), COPD (chronic obstructive pulmonary disease) ([facility]), Depression, Esophageal reflux, Essential hypertension (07/29/2019), Folic acid deficiency (11/02/2017), Foraminal stenosis of cervical region (05/10/2018), Gastric reflux, Head injury, Hemorrhoids, Herniated disc, cervical (10/21/2014), Hyperlipidemia, Hypertension, Hypertension, Hyponatremia (10/03/2016, 11/9/2015), Limb swelling, Lung disease, Major depressive disorder (07/29/2019), Mediastinal adenopathy (10/03/2016), Migraine, MVP (mitral valve prolapse) (10/07/2015), JIM (obstructive sleep apnea) (10/03/2016), Osteoarthritis (07/29/2019), Pneumonia, Shortness of breath, Slurred speech (12/01/2016), SOB (shortness of breath), Tremor of right hand (10/07/2015), and Ulnar neuritis (10/18/2018).     Surgical History: has a past surgical history that includes Appendectomy; Other surgical history (Left); Back surgery; Cervical fusion (11/05/2014, 3/11/2015); Cervical disc surgery (1996); Appendectomy; orthopedic surgery (Left); Spine surgery; Joint replacement; Nerve Surgery; and Colonoscopy (N/A, 1/4/2022).     Family History: family history includes Arthritis in his mother;  Cancer in his brother; Heart disease in his father, maternal grandfather, and mother; Stroke in his mother.     Social History: reports that he quit smoking about 5 years ago. His smoking use included cigarettes. He has a 50.00 pack-year smoking history. He has never used smokeless tobacco. He reports current alcohol use. He reports that he does not use drugs.    Allergies: Penicillins          Current Outpatient Medications:   •  albuterol (ACCUNEB) 1.25 MG/3ML nebulizer solution, Take 1 ampule by nebulization Every 6 (Six) Hours As Needed., Disp: , Rfl:   •  albuterol sulfate  (90 Base) MCG/ACT inhaler, INHALE 2 PUFFS EVERY 6 (SIX) HOURS AS NEEDED FOR WHEEZING OR SHORTNESS OF AIR FOR UP TO 30 DAYS., Disp: , Rfl:   •  amLODIPine (NORVASC) 5 MG tablet, Take 1 tablet by mouth Daily., Disp: 90 tablet, Rfl: 1  •  chlorpheniramine (CHLOR-TRIMETON) 4 MG tablet, Take 4 mg by mouth Every 6 (Six) Hours As Needed for Allergies., Disp: , Rfl:   •  clonazePAM (KlonoPIN) 1 MG tablet, TAKE ONE TABLET BY MOUTH TWO TIMES A DAY, Disp: 60 tablet, Rfl: 0  •  Diclofenac Sodium (VOLTAREN) 1 % gel gel, diclofenac 1 % topical gel  APPLY 2 GRAMS TO THE AFFECTED AREA(S) FOUR TIMES A DAY, Disp: , Rfl:   •  losartan (COZAAR) 50 MG tablet, TAKE ONE TABLET BY MOUTH TWO TIMES A DAY, Disp: 180 tablet, Rfl: 1  •  metoprolol succinate XL (TOPROL-XL) 200 MG 24 hr tablet, Take 0.5 tablets by mouth Daily., Disp: 90 tablet, Rfl: 1  •  nystatin (MYCOSTATIN) 522466 UNIT/GM powder, APPLY TO THE AFFECTED AREA(S) BY TOPICAL ROUTE 3 TIMES PER DAY, Disp: 60 g, Rfl: 2  •  oxyCODONE-acetaminophen (PERCOCET)  MG per tablet, TAKE 1 TABLET EVERY 8 HOURS BY ORAL ROUTE AS NEEDED FOR 30 DAYS., Disp: , Rfl:   •  pantoprazole (PROTONIX) 40 MG EC tablet, TAKE 1 TABLET (40 MG) BY ORAL ROUTE ONCE DAILY, Disp: 90 tablet, Rfl: 2  •  primidone (MYSOLINE) 50 MG tablet, Take 1 tablet by mouth 3 (Three) Times a Day., Disp: 270 tablet, Rfl: 1  •  Turmeric 500 MG  "capsule, Take 1,000 mg by mouth Daily., Disp: , Rfl:   •  atorvastatin (LIPITOR) 10 MG tablet, Take 1 tablet by mouth Daily., Disp: 90 tablet, Rfl: 1  •  Fluticasone Furoate-Vilanterol (Breo Ellipta) 200-25 MCG/INH inhaler, Inhale 1 puff Daily for 30 days. Rinse mouth out after each use., Disp: 1 each, Rfl: 11  •  tiotropium bromide monohydrate (Spiriva Respimat) 2.5 MCG/ACT aerosol solution inhaler, Inhale 2 puffs Daily for 30 days., Disp: 1 each, Rfl: 11    Medications Discontinued During This Encounter   Medication Reason   • polyethylene glycol (MIRALAX) 17 g packet *Therapy completed   • metoprolol succinate XL (TOPROL-XL) 100 MG 24 hr tablet Reorder         Review of Systems   Constitutional: Negative for fever.   Respiratory: Negative for cough and shortness of breath.    Cardiovascular: Negative for chest pain, palpitations and leg swelling.   Neurological: Negative for dizziness, weakness, numbness and headache.        Objective         Vitals:    03/02/22 1404   BP: 150/86   BP Location: Right arm   Patient Position: Sitting   Cuff Size: Large Adult   Pulse: 59   Resp: 16   Temp: 98.4 °F (36.9 °C)   TempSrc: Infrared   SpO2: 96%   Weight: 104 kg (229 lb)   Height: 182.9 cm (72\")     Body mass index is 31.06 kg/m².         Physical Exam  Vitals reviewed.   Constitutional:       Appearance: Normal appearance. He is well-developed.   HENT:      Head: Normocephalic and atraumatic.      Mouth/Throat:      Pharynx: No oropharyngeal exudate.   Eyes:      Conjunctiva/sclera: Conjunctivae normal.      Pupils: Pupils are equal, round, and reactive to light.   Cardiovascular:      Rate and Rhythm: Normal rate and regular rhythm.      Heart sounds: Normal heart sounds. No murmur heard.  No friction rub. No gallop.    Pulmonary:      Effort: Pulmonary effort is normal.      Breath sounds: Normal breath sounds. No wheezing or rhonchi.   Skin:     General: Skin is warm and dry.   Neurological:      Mental Status: He is " alert and oriented to person, place, and time.   Psychiatric:         Mood and Affect: Mood and affect normal.         Behavior: Behavior normal.         Thought Content: Thought content normal.         Judgment: Judgment normal.             Result Review :               Assessment and Plan     Diagnoses and all orders for this visit:    1. Essential hypertension (Primary)  Comments:  Increase metoprolol to 200mg and watch pressures at home and if drops or gets dizzy will adjust  Orders:  -     metoprolol succinate XL (TOPROL-XL) 200 MG 24 hr tablet; Take 0.5 tablets by mouth Daily.  Dispense: 90 tablet; Refill: 1  -     Comprehensive Metabolic Panel; Future  -     Lipid Panel With / Chol / HDL Ratio; Future  -     Urinalysis With Culture If Indicated -; Future  -     CBC (No Diff); Future    2. Vitamin D deficiency  -     Vitamin D 25 hydroxy; Future    3. Mixed hyperlipidemia  Comments:  continue atorvastatin at current dose.    4. Anxiety disorder, unspecified type              Follow Up     Return in about 3 months (around 6/2/2022).    Patient was given instructions and counseling regarding his condition or for health maintenance advice. Please see specific information pulled into the AVS if appropriate.

## 2022-03-17 DIAGNOSIS — F41.9 ANXIETY: ICD-10-CM

## 2022-03-17 RX ORDER — CLONAZEPAM 1 MG/1
TABLET ORAL
Qty: 60 TABLET | Refills: 0 | Status: SHIPPED | OUTPATIENT
Start: 2022-03-17 | End: 2022-04-18

## 2022-03-30 ENCOUNTER — TELEPHONE (OUTPATIENT)
Dept: FAMILY MEDICINE CLINIC | Facility: CLINIC | Age: 72
End: 2022-03-30

## 2022-03-30 ENCOUNTER — PATIENT OUTREACH (OUTPATIENT)
Dept: CASE MANAGEMENT | Facility: OTHER | Age: 72
End: 2022-03-30

## 2022-03-30 DIAGNOSIS — J44.9 CHRONIC OBSTRUCTIVE PULMONARY DISEASE, UNSPECIFIED COPD TYPE: ICD-10-CM

## 2022-03-30 DIAGNOSIS — I10 ESSENTIAL HYPERTENSION: ICD-10-CM

## 2022-03-30 DIAGNOSIS — I10 ESSENTIAL HYPERTENSION: Primary | ICD-10-CM

## 2022-03-30 RX ORDER — METOPROLOL SUCCINATE 200 MG/1
200 TABLET, EXTENDED RELEASE ORAL DAILY
Qty: 90 TABLET | Refills: 1 | Status: SHIPPED | OUTPATIENT
Start: 2022-03-30 | End: 2022-06-09 | Stop reason: SDUPTHER

## 2022-03-30 RX ORDER — LOSARTAN POTASSIUM 50 MG/1
50 TABLET ORAL 2 TIMES DAILY
Qty: 180 TABLET | Refills: 1 | Status: SHIPPED | OUTPATIENT
Start: 2022-03-30 | End: 2022-06-09 | Stop reason: SDUPTHER

## 2022-03-30 NOTE — TELEPHONE ENCOUNTER
Caller: Gabriel Jose BRAN    Relationship: Self    Best call back number: [phone]  [phone]    What medications are you currently taking:   Current Outpatient Medications on File Prior to Visit   Medication Sig Dispense Refill   • albuterol (ACCUNEB) 1.25 MG/3ML nebulizer solution Take 1 ampule by nebulization Every 6 (Six) Hours As Needed.     • albuterol sulfate  (90 Base) MCG/ACT inhaler INHALE 2 PUFFS EVERY 6 (SIX) HOURS AS NEEDED FOR WHEEZING OR SHORTNESS OF AIR FOR UP TO 30 DAYS.     • amLODIPine (NORVASC) 5 MG tablet Take 1 tablet by mouth Daily. 90 tablet 1   • atorvastatin (LIPITOR) 10 MG tablet Take 1 tablet by mouth Daily. 90 tablet 1   • chlorpheniramine (CHLOR-TRIMETON) 4 MG tablet Take 4 mg by mouth Every 6 (Six) Hours As Needed for Allergies.     • clonazePAM (KlonoPIN) 1 MG tablet TAKE ONE TABLET BY MOUTH TWO TIMES A DAY 60 tablet 0   • Diclofenac Sodium (VOLTAREN) 1 % gel gel diclofenac 1 % topical gel   APPLY 2 GRAMS TO THE AFFECTED AREA(S) FOUR TIMES A DAY     • Fluticasone Furoate-Vilanterol (Breo Ellipta) 200-25 MCG/INH inhaler Inhale 1 puff Daily for 30 days. Rinse mouth out after each use. 1 each 11   • losartan (COZAAR) 50 MG tablet TAKE ONE TABLET BY MOUTH TWO TIMES A  tablet 1   • metoprolol succinate XL (TOPROL-XL) 200 MG 24 hr tablet Take 0.5 tablets by mouth Daily. 90 tablet 1   • nystatin (MYCOSTATIN) 741376 UNIT/GM powder APPLY TO THE AFFECTED AREA(S) BY TOPICAL ROUTE 3 TIMES PER DAY 60 g 2   • oxyCODONE-acetaminophen (PERCOCET)  MG per tablet TAKE 1 TABLET EVERY 8 HOURS BY ORAL ROUTE AS NEEDED FOR 30 DAYS.     • pantoprazole (PROTONIX) 40 MG EC tablet TAKE 1 TABLET (40 MG) BY ORAL ROUTE ONCE DAILY 90 tablet 2   • primidone (MYSOLINE) 50 MG tablet Take 1 tablet by mouth 3 (Three) Times a Day. 270 tablet 1   • tiotropium bromide monohydrate (Spiriva Respimat) 2.5 MCG/ACT aerosol solution inhaler Inhale 2 puffs Daily for 30 days. 1 each 11   • Turmeric 500  MG capsule Take 1,000 mg by mouth Daily.       No current facility-administered medications on file prior to visit.          Which medication are you concerned about:   metoprolol succinate XL (TOPROL-XL) 200 MG 24 hr tablet    Who prescribed you this medication: JUAN    What are your concerns: PATIENT IS WANTING TO SPEAK WITH CLINICAL STAFF REGARDING THE BLOOD PRESSURE MEDICATIONS THAT HE TAKES AND THE INSTRUCTIONS ON THEM. HE IS REQUESTING CALL BACK AS SOON AS POSSIBLE.

## 2022-03-30 NOTE — OUTREACH NOTE
[facility] End of Month Documentation    This Chronic Medical Management Care Plan for Jose Rios, 71 y.o. male, has been No data recorded and a new plan of care implemented for the month of No data recorded.  A cumulative time of 20  minutes was spent on this patient record this month, including No data recorded.    Regarding the patient's problems: has Anemia; Anxiety disorder; Chronic obstructive pulmonary disease (HCC); CKD (chronic kidney disease) stage 3, GFR 30-59 ml/min (HCC); Essential hypertension; Hyperlipidemia; Hyponatremia; Major depressive disorder; MVP (mitral valve prolapse); JIM (obstructive sleep apnea); Osteoarthritis; Depression; Centrilobular emphysema (HCC); DDD (degenerative disc disease), cervical; Spondylosis without myelopathy; Tremor; and Vitamin D deficiency on their problem list., the following items were addressed: No data recorded and any changes can be found within the plan section of the note.  A detailed listing of time spent for chronic care management is tracked within each outreach encounter.  Current medications include:  has a current medication list which includes the following prescription(s): albuterol, albuterol sulfate hfa, amlodipine, atorvastatin, chlorpheniramine, clonazepam, diclofenac sodium, breo ellipta, losartan, metoprolol succinate xl, nystatin, oxycodone-acetaminophen, pantoprazole, primidone, spiriva respimat, and turmeric. and the patient is reported to be No data recorded,  Medications are reported to be No data recorded.  Regarding these diagnoses no new referrals were made.    The patient was monitored remotely for No data recorded.    The patient's physical needs include:  No data recorded.     The patient's mental support needs include:  No data recorded    The patient's cognitive support needs include:  No data recorded    The patient's psychosocial support needs include:  No data recorded    The patient's functional needs include: No data recorded    The  "patient's environmental needs include:  No data recorded    Care Plan overall comments:  No data recorded    Refer to previous outreach notes for more information on the areas listed above.    Monthly Billing Diagnoses  No diagnosis found.    Medications   · Medications have been reconciled    Care Plan progress this month:      Recently Modified Care Plans Updates made since 2/27/2022 12:00 AM     Hypertension         Problem Priority Last Modified      --  3/30/2022  4:22 PM by Sushila Zayas RN              Goal Recent Progress Last Modified      --  3/30/2022  4:22 PM by Sushila Zayas RN              Task Due Date Last Modified     Discuss barriers to medication adherence with patient --  3/1/2022  1:32 PM by Sushila Zayas RN              Problem Priority Last Modified      --  3/30/2022  4:22 PM by Sushila Zayas RN              Goal Recent Progress Last Modified      --  3/30/2022  4:22 PM by Sushila Zayas RN              Task Due Date Last Modified     Educate on disease process and complications associated with noncompliance --  3/1/2022  1:32 PM by Sushila Zayas RN                 Hypertension (Adult)         Problem Priority Last Modified     ELS HYPERTENSION (HYPERTENSION) (ADULT) --  3/30/2022  4:23 PM by Sushila Zayas RN              Goal Recent Progress Last Modified     Hypertension Monitored --  3/30/2022  4:23 PM by Sushila Zayas RN     Evidence-based guidance:   Promote initial use of ambulatory blood pressure measurements (for 3 days) to rule out \"white-coat\" effect; identify masked hypertension and presence or absence of nocturnal \"dipping\" of blood pressure.    Encourage continued use of home blood pressure monitoring and recording in blood pressure log; include symptoms of hypotension or potential medication side effects in log.   Review blood pressure measurements taken inside and outside of the provider office; establish baseline " and monitor trends; compare to target ranges or patient goal.   Share overall cardiovascular risk with patient; encourage changes to lifestyle risk factors, including alcohol consumption, smoking, inadequate exercise, poor dietary habits and stress.    Notes:              Task Due Date Last Modified     Identify and Monitor Blood Pressure Elevation --  3/30/2022  4:23 PM by Sushila Zayas, RN     Care Management Activities:      - blood pressure trends reviewed  - not discussed during this outreach      Notes:              Problem Priority Last Modified     ELS DISEASE PROGRESSION (HYPERTENSION) (ADULT) --  3/30/2022  4:23 PM by Sushila Zayas, RN              Goal Recent Progress Last Modified     Disease Progression Prevented or Minimized --  3/30/2022  4:23 PM by Sushila Zayas, RN     Evidence-based guidance:   Tailor lifestyle advice to individual; review progress regularly; give frequent encouragement and respond positively to incremental successes.   Assess for and promote awareness of worsening disease or development of comorbidity.   Prepare patient for laboratory and diagnostic exams based on risk and presentation.   Prepare patient for use of pharmacologic therapy that may include diuretic, beta-blocker, beta-blocker/thiazide combination, angiotensin-converting enzyme inhibitor, renin-angiotensin blocker or calcium-channel blocker.   Expect periodic adjustments to pharmacologic therapy; manage side effects.   Promote a healthy diet that includes primarily plant-based foods, such as fruits, vegetables, whole grains, beans and legumes, low-fat dairy and lean meats.    Consider moderate reduction in sodium intake by avoiding the addition of salt to prepared foods and limiting processed meats, canned soup, frozen meals and salty snacks.    Promote a regular, daily exercise goal of 150 minutes per week of moderate exercise based on tolerance, ability and patient choice; consider referral to  physical therapist, community wellness and/or activity program.   Encourage the avoidance of no more than 2 hours per day of sedentary activity, such as recreational screen time.   Review sources of stress; explore current coping strategies and encourage use of mindfulness, yoga, meditation or exercise to manage stress.    Notes:              Task Due Date Last Modified     Alleviate Barriers to Hypertension Treatment --  3/30/2022  4:23 PM by Sushila Zayas, RN     Care Management Activities:      - not discussed during this outreach      Notes:              Problem Priority Last Modified     ELS RESISTANT HYPERTENSION (HYPERTENSION) (ADULT) --  3/30/2022  4:23 PM by Sushila Zayas, RN              Goal Recent Progress Last Modified     Response to Treatment Maximized --  3/30/2022  4:23 PM by Sushila Zayas, RN     Evidence-based guidance:   Assess patient response to treatment, including presence or absence of medication side effects, degree of blood pressure control and patient satisfaction.   Assess technique (including cuff size and placement), measurement times, condition and calibration of blood pressure cuff set (both at-home and in-office equipment).   Assess factors that may influence response to treatment, including nonadherence to pharmacologic treatment plan, diet or activity changes and/or presence of pain, stress or sleep disturbance.   Screen for signs and symptoms of depression; if present, refer for or complete a comprehensive assessment.   Evaluate social and economic barriers that may affect adherence to treatment plan   Address pharmacologic nonadherence by simplifying dosing regimen, counseling or support by pharmacist, financial assistance, self-monitoring of blood pressure, use of motivational interviewing, voice or text messages.   Encourage behavioral adherence strategies, like habit-based interventions that link medication taking with existing daily routines.   Assess  barriers to regular, daily physical activity; support family or support person-oriented activity changes and utilization of community activity or sports program.   Address barriers to dietary changes, especially sodium restriction, with referrals to community programs, like cooking classes, meal services or intensive education when available.   Refer to community-based peer support program or nurse home-visiting program.   Assess for chronic pain; when present add additional goals (Chronic Pain Care Plan Guide) as needed.   Provide frequent follow-up by telephone, telemonitoring, patient-practice portal or with home visit.   Review alcohol use screen; address using brief intervention beginning with risk that interferes with blood pressure control; refer for treatment when excessive alcohol use is noted.   Screen for obstructive sleep apnea; prepare patient for polysomnography based on risk and presentation and use of noninvasive ventilation to relieve obstructive sleep apnea when present.    Notes:              Task Due Date Last Modified     Facilitate Adherence to Lifestyle Change --  3/30/2022  4:23 PM by Sushila Zayas RN     Care Management Activities:      - not discussed during this outreach      Notes:                    Instructions   · Patient was provided an electronic copy of care plan  · CCM services were explained and offered and patient has accepted these services.  · Patient has given their written consent to receive CCM services and understands that this includes the authorization of electronic communication of medical information with the other treating providers.  · Patient understands that they may stop CCM services at any time and these changes will be effective at the end of the calendar month and will effectively revocate the agreement of CCM services.  · Patient understands that only one practitioner can furnish and be paid for CCM services during one calendar month.  Patient also  understands that there may be co-payment or deductible fees in association with CCM services.  · Patient will continue with at least monthly follow-up calls with the Nurse Navigator.    CHRISTINA ARMSTRONG  Ambulatory Case Management    3/30/2022, 16:23 EDT

## 2022-03-30 NOTE — TELEPHONE ENCOUNTER
Pt's medication was increased to 200mg but it was sent in for 100mg, pended increased dose as well as his losartan

## 2022-04-18 DIAGNOSIS — F41.9 ANXIETY: ICD-10-CM

## 2022-04-18 RX ORDER — CLONAZEPAM 1 MG/1
TABLET ORAL
Qty: 60 TABLET | Refills: 0 | Status: SHIPPED | OUTPATIENT
Start: 2022-04-18 | End: 2022-05-16

## 2022-04-20 ENCOUNTER — PATIENT OUTREACH (OUTPATIENT)
Dept: CASE MANAGEMENT | Facility: OTHER | Age: 72
End: 2022-04-20

## 2022-04-20 DIAGNOSIS — I10 HYPERTENSION, UNSPECIFIED TYPE: Primary | ICD-10-CM

## 2022-04-20 PROCEDURE — 99490 CHRNC CARE MGMT STAFF 1ST 20: CPT | Performed by: NURSE PRACTITIONER

## 2022-04-20 NOTE — OUTREACH NOTE
AMBULATORY CASE MANAGEMENT NOTE    Name and Relationship of Patient/Support Person: Jose Rios E - Self    CCM Interim Update    Reviewed chart prior to calling patient, and he said that he was doing alright. Asked if he wanted me to get him a follow-up appointment with pulmonology, it has been almost a year since he has seen them. However, he said they are having car issues and wants to wait until they get that situated. He said he is using his C-PAP machine at night, but thinks it might be almost time for a new one. Advised they could get that ordered for him at the pulmonology office.   Asked how his BP has been and he said still 150's/80's. He said that he takes his medication daily, watches his salt intake, however he is not really able to exercise. He said that his back hurts a lot, and his knee isn't in great shape. Asked if he could try to go for a walk after supper and he said that he might be able to limp around. Advised to take his BP several times a week and put it in a log, so we have more accurate numbers to go by.  No needs at this time      Education Documentation  Unresolved/Worsening Symptoms, taught by Sushila Zayas, RN at 4/20/2022  5:11 PM.  Learner: Patient  Readiness: Acceptance  Method: Explanation  Response: Verbalizes Understanding    Self-Care, taught by Sushila Zayas, RN at 4/20/2022  5:11 PM.  Learner: Patient  Readiness: Acceptance  Method: Explanation  Response: Verbalizes Understanding    Provider Follow-Up, taught by Sushila Zayas, RN at 4/20/2022  5:11 PM.  Learner: Patient  Readiness: Acceptance  Method: Explanation  Response: Verbalizes Understanding    Medication Management, taught by Sushila Zayas, RN at 4/20/2022  5:11 PM.  Learner: Patient  Readiness: Acceptance  Method: Explanation  Response: Verbalizes Understanding    Blood Pressure Monitoring, taught by Sushila Zayas, RN at 4/20/2022  5:11 PM.  Learner: Patient  Readiness:  Acceptance  Method: Explanation  Response: Verbalizes Understanding    Description, taught by Christina Zayas, RN at 4/20/2022  5:11 PM.  Learner: Patient  Readiness: Acceptance  Method: Explanation  Response: Verbalizes Understanding        CHRISTINA ARMSTRONG  Ambulatory Case Management    4/20/2022, 17:11 EDT

## 2022-04-25 ENCOUNTER — TRANSCRIBE ORDERS (OUTPATIENT)
Dept: VASCULAR SURGERY | Facility: HOSPITAL | Age: 72
End: 2022-04-25

## 2022-04-25 DIAGNOSIS — I71.40 AAA (ABDOMINAL AORTIC ANEURYSM) WITHOUT RUPTURE: Primary | ICD-10-CM

## 2022-04-28 ENCOUNTER — TELEPHONE (OUTPATIENT)
Dept: FAMILY MEDICINE CLINIC | Facility: CLINIC | Age: 72
End: 2022-04-28

## 2022-04-28 ENCOUNTER — PATIENT OUTREACH (OUTPATIENT)
Dept: CASE MANAGEMENT | Facility: OTHER | Age: 72
End: 2022-04-28

## 2022-04-28 DIAGNOSIS — F41.9 ANXIETY: Primary | ICD-10-CM

## 2022-04-28 DIAGNOSIS — I10 ESSENTIAL HYPERTENSION: Primary | ICD-10-CM

## 2022-04-28 DIAGNOSIS — J44.9 CHRONIC OBSTRUCTIVE PULMONARY DISEASE, UNSPECIFIED COPD TYPE: ICD-10-CM

## 2022-04-28 RX ORDER — BUPROPION HYDROCHLORIDE 150 MG/1
150 TABLET ORAL DAILY
Qty: 30 TABLET | Refills: 2 | Status: SHIPPED | OUTPATIENT
Start: 2022-04-28 | End: 2022-06-08 | Stop reason: SDUPTHER

## 2022-04-28 NOTE — TELEPHONE ENCOUNTER
Caller: Jose Rios    Relationship: Self    Best call back number: [phone]    What medication are you requesting: WELLBUTRIN    If a prescription is needed, what is your preferred pharmacy and phone number: [facility] - [place], [place] - [address], [address] - [phone]  - [phone]     Additional notes:    PATIENT STATES HE WAS PREVIOUSLY PRESCRIBED THIS MEDICATION AND WOULD LIKE TO BE PLACED BACK ON IT.

## 2022-04-28 NOTE — OUTREACH NOTE
[facility] End of Month Documentation    This Chronic Medical Management Care Plan for oJse Rios, 71 y.o. male, has been monitored and managed; reviewed and a new plan of care implemented for the month of April.  A cumulative time of 21  minutes was spent on this patient record this month, including phone call with patient; chart review.    Regarding the patient's problems: has Anemia; Anxiety disorder; Chronic obstructive pulmonary disease (HCC); CKD (chronic kidney disease) stage 3, GFR 30-59 ml/min (HCC); Essential hypertension; Hyperlipidemia; Hyponatremia; Major depressive disorder; MVP (mitral valve prolapse); JIM (obstructive sleep apnea); Osteoarthritis; Depression; Centrilobular emphysema (HCC); DDD (degenerative disc disease), cervical; Spondylosis without myelopathy; Tremor; and Vitamin D deficiency on their problem list., the following items were addressed: medical records; medications and any changes can be found within the plan section of the note.  A detailed listing of time spent for chronic care management is tracked within each outreach encounter.  Current medications include:  has a current medication list which includes the following prescription(s): albuterol, albuterol sulfate hfa, amlodipine, atorvastatin, chlorpheniramine, clonazepam, diclofenac sodium, breo ellipta, losartan, metoprolol succinate xl, nystatin, oxycodone-acetaminophen, pantoprazole, primidone, spiriva respimat, and turmeric. and the patient is reported to be patient is compliant with medication protocol,  Medications are reported to be non-effective in controlling symptoms and changes have been made to the medication protocol.  Regarding these diagnoses no new referrals were made.  All notes on chart for PCP to review.    The patient was monitored remotely for blood pressure; medications; weight; activity level; pain, Will call patient back in 2 weeks to see what his BP readings have been .    The patient's physical needs  "include:  medication education; physical healthcare.     The patient's mental support needs include:  needs met    The patient's cognitive support needs include:  medication; health care    The patient's psychosocial support needs include:  needs met    The patient's functional needs include: physical healthcare    The patient's environmental needs include:  not applicable    Care Plan overall comments:  N/A    Refer to previous outreach notes for more information on the areas listed above.    Monthly Billing Diagnoses  (I10) Essential hypertension    (J44.9) Chronic obstructive pulmonary disease, unspecified COPD type (HCC)    Medications   · Medications have been reconciled    Care Plan progress this month:      Recently Modified Care Plans Updates made since 3/28/2022 12:00 AM     Hypertension (Adult)         Problem Priority Last Modified     ELS HYPERTENSION (HYPERTENSION) (ADULT) --  3/30/2022  4:23 PM by Sushila Zayas, RN              Goal Recent Progress Last Modified     Hypertension Monitored --  3/30/2022  4:23 PM by Sushila Zayas, RN     Evidence-based guidance:   Promote initial use of ambulatory blood pressure measurements (for 3 days) to rule out \"white-coat\" effect; identify masked hypertension and presence or absence of nocturnal \"dipping\" of blood pressure.    Encourage continued use of home blood pressure monitoring and recording in blood pressure log; include symptoms of hypotension or potential medication side effects in log.   Review blood pressure measurements taken inside and outside of the provider office; establish baseline and monitor trends; compare to target ranges or patient goal.   Share overall cardiovascular risk with patient; encourage changes to lifestyle risk factors, including alcohol consumption, smoking, inadequate exercise, poor dietary habits and stress.    Notes:              Task Due Date Last Modified     Identify and Monitor Blood Pressure Elevation --  " 3/30/2022  4:23 PM by Sushila Zayas, RN     Care Management Activities:      - blood pressure trends reviewed  - home or ambulatory blood pressure monitoring encouraged      Notes:              Problem Priority Last Modified     ELS DISEASE PROGRESSION (HYPERTENSION) (ADULT) --  3/30/2022  4:23 PM by Sushila Zayas, RN              Goal Recent Progress Last Modified     Disease Progression Prevented or Minimized --  3/30/2022  4:23 PM by Sushila Zayas RN     Evidence-based guidance:   Tailor lifestyle advice to individual; review progress regularly; give frequent encouragement and respond positively to incremental successes.   Assess for and promote awareness of worsening disease or development of comorbidity.   Prepare patient for laboratory and diagnostic exams based on risk and presentation.   Prepare patient for use of pharmacologic therapy that may include diuretic, beta-blocker, beta-blocker/thiazide combination, angiotensin-converting enzyme inhibitor, renin-angiotensin blocker or calcium-channel blocker.   Expect periodic adjustments to pharmacologic therapy; manage side effects.   Promote a healthy diet that includes primarily plant-based foods, such as fruits, vegetables, whole grains, beans and legumes, low-fat dairy and lean meats.    Consider moderate reduction in sodium intake by avoiding the addition of salt to prepared foods and limiting processed meats, canned soup, frozen meals and salty snacks.    Promote a regular, daily exercise goal of 150 minutes per week of moderate exercise based on tolerance, ability and patient choice; consider referral to physical therapist, community wellness and/or activity program.   Encourage the avoidance of no more than 2 hours per day of sedentary activity, such as recreational screen time.   Review sources of stress; explore current coping strategies and encourage use of mindfulness, yoga, meditation or exercise to manage stress.    Notes:               Task Due Date Last Modified     Alleviate Barriers to Hypertension Treatment --  3/30/2022  4:23 PM by Sushila Zayas, RN     Care Management Activities:      - medical nutrition therapy provided  - reduction in sedentary activities encouraged      Notes:              Problem Priority Last Modified     ELS RESISTANT HYPERTENSION (HYPERTENSION) (ADULT) --  3/30/2022  4:23 PM by Sushila Zayas, RN              Goal Recent Progress Last Modified     Response to Treatment Maximized --  3/30/2022  4:23 PM by Sushila Zayas, RN     Evidence-based guidance:   Assess patient response to treatment, including presence or absence of medication side effects, degree of blood pressure control and patient satisfaction.   Assess technique (including cuff size and placement), measurement times, condition and calibration of blood pressure cuff set (both at-home and in-office equipment).   Assess factors that may influence response to treatment, including nonadherence to pharmacologic treatment plan, diet or activity changes and/or presence of pain, stress or sleep disturbance.   Screen for signs and symptoms of depression; if present, refer for or complete a comprehensive assessment.   Evaluate social and economic barriers that may affect adherence to treatment plan   Address pharmacologic nonadherence by simplifying dosing regimen, counseling or support by pharmacist, financial assistance, self-monitoring of blood pressure, use of motivational interviewing, voice or text messages.   Encourage behavioral adherence strategies, like habit-based interventions that link medication taking with existing daily routines.   Assess barriers to regular, daily physical activity; support family or support person-oriented activity changes and utilization of community activity or sports program.   Address barriers to dietary changes, especially sodium restriction, with referrals to community programs, like cooking  classes, meal services or intensive education when available.   Refer to community-based peer support program or nurse home-visiting program.   Assess for chronic pain; when present add additional goals (Chronic Pain Care Plan Guide) as needed.   Provide frequent follow-up by telephone, telemonitoring, patient-practice portal or with home visit.   Review alcohol use screen; address using brief intervention beginning with risk that interferes with blood pressure control; refer for treatment when excessive alcohol use is noted.   Screen for obstructive sleep apnea; prepare patient for polysomnography based on risk and presentation and use of noninvasive ventilation to relieve obstructive sleep apnea when present.    Notes:              Task Due Date Last Modified     Facilitate Adherence to Lifestyle Change --  3/30/2022  4:23 PM by Christina Zayas RN     Care Management Activities:      - home blood pressure monitoring technique reviewed      Notes:                    Instructions   · Patient was provided an electronic copy of care plan  · CCM services were explained and offered and patient has accepted these services.  · Patient has given their written consent to receive CCM services and understands that this includes the authorization of electronic communication of medical information with the other treating providers.  · Patient understands that they may stop CCM services at any time and these changes will be effective at the end of the calendar month and will effectively revocate the agreement of CCM services.  · Patient understands that only one practitioner can furnish and be paid for CCM services during one calendar month.  Patient also understands that there may be co-payment or deductible fees in association with CCM services.  · Patient will continue with at least monthly follow-up calls with the Nurse Navigator.    CHRISTINA ARMSTRONG  Ambulatory Case Management    4/28/2022, 08:44 EDT

## 2022-04-28 NOTE — TELEPHONE ENCOUNTER
Patient wants to be placed back on Wellbutrin. Called patient to ask if he was taking anything else and patient was slurring his words and not making sense of the conversation. Klonopin 1mg take twice a day was sent to the pharam on 4/18/2022    Please advise.

## 2022-05-16 DIAGNOSIS — F41.9 ANXIETY: ICD-10-CM

## 2022-05-16 RX ORDER — CLONAZEPAM 1 MG/1
TABLET ORAL
Qty: 60 TABLET | Refills: 0 | Status: SHIPPED | OUTPATIENT
Start: 2022-05-16 | End: 2022-06-15

## 2022-05-19 NOTE — PROGRESS NOTES
Primary Care Provider  Jagdeep Bonner APRN     Referring Provider  No ref. provider found     Chief Complaint  Sleep Apnea    Subjective          History of Presenting Illness  Patient is a 71-year-old male, patient of Dr. Rodriguez who presents for management of chronic obstructive pulmonary disease and obstructive sleep apnea who presents for follow-up visit today.  Patient states that his CP[facility] machine broke [facility]  Patient states he is currently not using his CPAP machine.  Patient states that he does have excessive daytime sleepiness, however, denies any morning headaches.  Patient states that since last visit his breathing is at baseline.  Patient states that he will get short of breath that is worse with exertion, moderate in severity, and improved with rest.  Patient states he is taking Breo and Spiriva every day as prescribed, however, is not sure these inhalers are helping.  Patient states he is using albuterol inhaler and albuterol nebulizer treatments as needed.  Patient states that he has not had take any antibiotics or steroids since last office visit and denies any hospitalizations since last office visit. Patient denies fever, chills, night sweats, swollen glands in the head and neck, unintentional weight loss, hemoptysis, purulent sputum production, dysphagia, chest pain, palpitations, chest tightness, abdominal pain, nausea, vomiting, and diarrhea.  Patient also denies any myalgias, changes in sense of taste and/or smell, sore throat, any other coronavirus or flu-like symptoms.  Patient denies any leg swelling, orthopnea, paroxysmal nocturnal dyspnea.  Patient is able to perform activities of daily living.    Review of Systems   Constitutional: Positive for fatigue. Negative for activity change, appetite change, chills, diaphoresis, fever, unexpected weight gain and unexpected weight loss.        Negative for Insomnia   HENT: Negative for  congestion (Nasal), mouth sores, nosebleeds, postnasal drip, sore throat, swollen glands and trouble swallowing.         Negative for Thrush  Negative for Hoarseness  Negative for Allergies/Hay Fever  Negative for Recent Head injury  Negative for Ear Fullness  Negative for Nasal or Sinus pain  Negative for Dry lips  Negative for Nasal discharge   Respiratory: Positive for shortness of breath. Negative for apnea, cough, chest tightness and wheezing.         Negative for Hemoptysis  Negative for Pleuritic pain   Cardiovascular: Negative for chest pain, palpitations and leg swelling.        Negative for Claudication  Negative for Cyanosis  Negative for Dyspnea on exertion   Gastrointestinal: Negative for abdominal pain, diarrhea, nausea, vomiting and GERD.   Musculoskeletal: Negative for joint swelling and myalgias.        Negative for Joint pain  Negative for Joint stiffness   Skin: Negative for color change, dry skin, pallor and rash.   Neurological: Negative for syncope, weakness and headache.   Hematological: Negative for adenopathy. Does not bruise/bleed easily.        Family History   Problem Relation Age of Onset   • Heart disease Mother    • Stroke Mother    • Arthritis Mother    • Heart disease Father    • Heart disease Maternal Grandfather    • Cancer Brother    • Malig Hyperthermia Neg Hx         Social History     Socioeconomic History   • Marital status:    Tobacco Use   • Smoking status: Former Smoker     Packs/day: 1.00     Years: 50.00     Pack years: 50.00     Types: Cigarettes     Quit date: 2016     Years since quittin.8   • Smokeless tobacco: Never Used   Vaping Use   • Vaping Use: Never used   Substance and Sexual Activity   • Alcohol use: Yes     Comment: 1 or 2 a amonth    • Drug use: Never   • Sexual activity: Defer     Partners: Female        Past Medical History:   Diagnosis Date   • Acid reflux disease    • Anemia 10/24/2016   • Anxiety    • Anxiety disorder 2017    HE  STATES THAT HIS ANXIETY HAS BEEN WORSE. HE HAS GONE TO [facility] IN THE PAST AND TRIED SSRI'S W/O MUCH BENEFIT. HE STATES THAT KLONOPIN HELPED, BUT STOPPED GOING AND THEN STOPPED KLONOPIN. RESTART KLONOPIN.    • Arthritis    • Arthritis     GENERALIZED  R KNEE   • Bipolar affect, depressed ([facility])    • Bipolar disorder ([facility])    • Cervical radiculopathy 12/15/2015   • Cervical spinal stenosis 12/15/2015   • Cervicalgia 04/03/2018   • Chronic bronchitis ([facility])    • Chronic pain of both knees 02/02/2018   • CKD (chronic kidney disease) stage 3, GFR 30-59 ml/min ([facility]) 02/02/2018   • COPD (chronic obstructive pulmonary disease) ([facility])    • COPD (chronic obstructive pulmonary disease) ([facility])     USES INHALERS   • Depression    • Esophageal reflux    • Essential hypertension 07/29/2019   • Folic acid deficiency 11/02/2017   • Foraminal stenosis of cervical region 05/10/2018   • Gastric reflux    • Head injury    • Hemorrhoids     RECTAL PROB (ACID REFLUX)   • Herniated disc, cervical 10/21/2014    C5-6, RIGHT   • Hyperlipidemia    • Hypertension    • Hypertension     CONTROLLED WITH MEDS   • Hyponatremia 10/03/2016, 11/9/2015   • Limb swelling    • Lung disease    • Major depressive disorder 07/29/2019   • Mediastinal adenopathy 10/03/2016   • Migraine    • MVP (mitral valve prolapse) 10/07/2015   • JIM (obstructive sleep apnea) 10/03/2016   • Osteoarthritis 07/29/2019   • Pneumonia    • Shortness of breath    • Slurred speech 12/01/2016   • SOB (shortness of breath)    • Tremor of right hand 10/07/2015   • Ulnar neuritis 10/18/2018    CLINICAL        Immunization History   Administered Date(s) Administered   • COVID-19 (AVTAR) 05/27/2021, 11/10/2021   • Fluzone High-Dose 65+yrs 09/20/2021   • Fluzone Split Quad (Multi-dose) 10/16/2017, 10/01/2018   • Influenza TIV (IM) 10/01/2019   • Influenza, Unspecified 10/01/2020, 02/28/2021   • Pneumococcal Conjugate 13-Valent (PCV13) 07/29/2019, 07/29/2019   • Pneumococcal  Polysaccharide (PPSV23) 08/01/2014, 08/01/2014       Allergies   Allergen Reactions   • Penicillins Swelling          Current Outpatient Medications:   •  albuterol (ACCUNEB) 1.25 MG/3ML nebulizer solution, Take 3 mL by nebulization Every 6 (Six) Hours As Needed for Wheezing or Shortness of Air for up to 30 days., Disp: 120 each, Rfl: 11  •  albuterol sulfate  (90 Base) MCG/ACT inhaler, Inhale 2 puffs Every 4 (Four) Hours As Needed for Wheezing or Shortness of Air for up to 30 days., Disp: 18 g, Rfl: 11  •  amLODIPine (NORVASC) 5 MG tablet, Take 1 tablet by mouth Daily., Disp: 90 tablet, Rfl: 1  •  atorvastatin (LIPITOR) 10 MG tablet, Take 1 tablet by mouth Daily., Disp: 90 tablet, Rfl: 1  •  buPROPion XL (Wellbutrin XL) 150 MG 24 hr tablet, Take 1 tablet by mouth Daily., Disp: 30 tablet, Rfl: 2  •  chlorpheniramine (CHLOR-TRIMETON) 4 MG tablet, Take 4 mg by mouth Every 6 (Six) Hours As Needed for Allergies., Disp: , Rfl:   •  clonazePAM (KlonoPIN) 1 MG tablet, TAKE ONE TABLET BY MOUTH TWO TIMES A DAY, Disp: 60 tablet, Rfl: 0  •  Diclofenac Sodium (VOLTAREN) 1 % gel gel, diclofenac 1 % topical gel  APPLY 2 GRAMS TO THE AFFECTED AREA(S) FOUR TIMES A DAY, Disp: , Rfl:   •  losartan (COZAAR) 50 MG tablet, Take 1 tablet by mouth 2 (Two) Times a Day., Disp: 180 tablet, Rfl: 1  •  metoprolol succinate XL (TOPROL-XL) 200 MG 24 hr tablet, Take 1 tablet by mouth Daily., Disp: 90 tablet, Rfl: 1  •  nystatin (MYCOSTATIN) 827333 UNIT/GM powder, APPLY TO THE AFFECTED AREA(S) BY TOPICAL ROUTE 3 TIMES PER DAY, Disp: 60 g, Rfl: 2  •  oxyCODONE-acetaminophen (PERCOCET)  MG per tablet, TAKE 1 TABLET EVERY 8 HOURS BY ORAL ROUTE AS NEEDED FOR 30 DAYS., Disp: , Rfl:   •  pantoprazole (PROTONIX) 40 MG EC tablet, TAKE 1 TABLET (40 MG) BY ORAL ROUTE ONCE DAILY, Disp: 90 tablet, Rfl: 2  •  primidone (MYSOLINE) 50 MG tablet, Take 1 tablet by mouth 3 (Three) Times a Day., Disp: 270 tablet, Rfl: 1  •  Turmeric 500 MG capsule, Take  "1,000 mg by mouth Daily., Disp: , Rfl:   •  Budeson-Glycopyrrol-Formoterol (Breztri Aerosphere) 160-9-4.8 MCG/ACT aerosol inhaler, Inhale 2 puffs 2 (Two) Times a Day for 30 days. Rinse mouth out after each use, Disp: 1 each, Rfl: 11     Objective     Physical Exam  Vital Signs:   WDWN, Alert, NAD.    HEENT:  PERRL, EOMI.  OP, nares clear, no sinus tenderness  Neck:  Supple, no JVD, no thyromegaly.  Lymph: no axillary, cervical, supraclavicular lymphadenopathy noted bilaterally  Chest:   Mildly decreased breath sounds throughout. No wheezes, rales, or rhonchi appreciated.  Normal work of breathing noted.  Patient is able speak full sentences without difficulty.  CV: RRR, no MGR, pulses 2+, equal.  Abd:  Soft, NT, ND, + BS, no HSM  EXT:  no clubbing, no cyanosis, no edema, no joint tenderness  Neuro:  A&Ox3, CN grossly intact, no focal deficits.  Skin: No rashes or lesions noted.    /70 (BP Location: Left arm, Patient Position: Sitting)   Pulse 62   Temp 97.9 °F (36.6 °C)   Resp 20   Ht 182.9 cm (72\")   Wt 93 kg (205 lb)   SpO2 96% Comment: room air  BMI 27.80 kg/m²         Result Review :   I have reviewed my last office visit note.         Assessment and Plan      Assessment:  1. Chronic dyspnea at baseline.       2. Emphysema.  Patient on triple inhaler therapy.      3. Obstructive sleep apnea.  CPAP therapy currently on hold due to recall and patient waiting on a new machine.     4.  Stable aortic aneurysm.  5. Tobacco abuse of cigarettes in remission.  Patient already enrolled in lung cancer screening.  Next low-dose chest CT scan is due.        Plan:  1.  Will stop Breo and Spiriva.  Will start patient on Breztri 2 puffs twice daily.  Patient is advised to rinse his mouth out after each use.    2.  Continue albuterol inhaler and albuterol nebulizer treatments as needed.  3.  Patient is due for low-dose chest CT scan.  Order placed today.  4.  Patient is waiting on a new CPAP machine from [facility] " as his current machine is no longer working.  Medical assistant did call [facility] today while patient was in the office and reports that [facility] stated there is a 3 to 6-month delay on patients receiving new machines due to recent recall.  5.  Vaccination status: patient reports they are up-to-date with flu, pneumonia, and Covid vaccines.  Patient is advised to continue to follow CDC recommendations such as social distancing wearing a mask and washing hands for at least 20 seconds.  6.  Smoking status: patient is a former cigarette smoker. Patient already enrolled in lung cancer screening.   7.  Patient to call the office, 911, or go to the ER with new or worsening symptoms.  8.  Follow-up in 6 months, sooner if needed.          Follow Up   Return in about 6 months (around 11/26/2022).  Patient was given instructions and counseling regarding his condition or for health maintenance advice. Please see specific information pulled into the AVS if appropriate.

## 2022-05-25 ENCOUNTER — HOSPITAL ENCOUNTER (OUTPATIENT)
Dept: CARDIOLOGY | Facility: HOSPITAL | Age: 72
Discharge: HOME | End: 2022-05-25

## 2022-05-25 ENCOUNTER — OFFICE VISIT (OUTPATIENT)
Dept: VASCULAR SURGERY | Facility: HOSPITAL | Age: 72
End: 2022-05-25

## 2022-05-25 VITALS
RESPIRATION RATE: 18 BRPM | DIASTOLIC BLOOD PRESSURE: 92 MMHG | HEART RATE: 57 BPM | SYSTOLIC BLOOD PRESSURE: 158 MMHG | TEMPERATURE: 97.5 F | OXYGEN SATURATION: 96 %

## 2022-05-25 DIAGNOSIS — I71.40 AAA (ABDOMINAL AORTIC ANEURYSM) WITHOUT RUPTURE: ICD-10-CM

## 2022-05-25 DIAGNOSIS — I71.40 ABDOMINAL AORTIC ANEURYSM (AAA) WITHOUT RUPTURE: Primary | ICD-10-CM

## 2022-05-25 LAB
ABDOMINAL DIST AORTA AP: 1.4 CM
ABDOMINAL DIST AORTA TRANS: 2.5 CM
ABDOMINAL LT COM ILIAC AP: 1.4 CM
ABDOMINAL LT COM ILIAC TRANS: 1.1 CM
ABDOMINAL MID AORTA AP: 3 CM
ABDOMINAL MID AORTA TRANS: 3.9 CM
ABDOMINAL PROX AORTA AP: 1.7 CM
ABDOMINAL PROX AORTA TRANS: 2.6 CM
ABDOMINAL RT COM ILIAC AP: 1 CM
ABDOMINAL RT COM ILIAC TRANS: 1.75 CM
MAXIMAL PREDICTED HEART RATE: 151 BPM
STRESS TARGET HR: 128 BPM

## 2022-05-25 PROCEDURE — 93978 VASCULAR STUDY: CPT

## 2022-05-25 PROCEDURE — 93978 VASCULAR STUDY: CPT | Performed by: SURGERY

## 2022-05-25 PROCEDURE — 99213 OFFICE O/P EST LOW 20 MIN: CPT | Performed by: NURSE PRACTITIONER

## 2022-05-25 NOTE — PROGRESS NOTES
[facility] Vascular Surgery Office Follow Up Note     Date of Encounter: 05/25/2022     MRN Number: [id number]  Name: Jose Rios  Phone Number: [phone]     Referred By: Griselda Powell APRN  PCP: Jagdeep Bonner APRN    Chief Complaint:    Chief Complaint   Patient presents with   • Aortic Aneurysm     PATIENT IS HERE AS A FOLLOW UP WITH ABDOMINAL STUDIES DONE.        Subjective      History of Present Illness:    Jose Rios is a 71 y.o. male presents for an annual follow-up for abdominal aortic aneurysm with abdominal ultrasound performed today.  Mr. Rios has chronic back pain and COPD that limits his activities.  He denies any abdominal pain or claudication symptoms.  He is a former smoker who quit in 2016, smoked approximately 1 pack/day for 50 years.  No other complaints at this time    Review of Systems:  Review of Systems   Constitutional: Negative.   HENT: Negative.    Cardiovascular: Negative.    Respiratory: Negative.    Skin: Negative.    Musculoskeletal: Negative.    Gastrointestinal: Negative.    Neurological: Negative.    Psychiatric/Behavioral: Negative.      I have reviewed the following portions of the patient's history: allergies, current medications, past family history, past medical history, past social history, past surgical history and problem list and confirm it's accurate.    Allergies:  Allergies   Allergen Reactions   • Penicillins Swelling       Medications:      Current Outpatient Medications:   •  albuterol (ACCUNEB) 1.25 MG/3ML nebulizer solution, Take 1 ampule by nebulization Every 6 (Six) Hours As Needed., Disp: , Rfl:   •  albuterol sulfate  (90 Base) MCG/ACT inhaler, INHALE 2 PUFFS EVERY 6 (SIX) HOURS AS NEEDED FOR WHEEZING OR SHORTNESS OF AIR FOR UP TO 30 DAYS., Disp: , Rfl:   •  amLODIPine (NORVASC) 5 MG tablet, Take 1 tablet by mouth Daily., Disp: 90 tablet, Rfl: 1  •  atorvastatin (LIPITOR) 10 MG tablet, Take 1 tablet by mouth Daily., Disp:  90 tablet, Rfl: 1  •  buPROPion XL (Wellbutrin XL) 150 MG 24 hr tablet, Take 1 tablet by mouth Daily., Disp: 30 tablet, Rfl: 2  •  chlorpheniramine (CHLOR-TRIMETON) 4 MG tablet, Take 4 mg by mouth Every 6 (Six) Hours As Needed for Allergies., Disp: , Rfl:   •  clonazePAM (KlonoPIN) 1 MG tablet, TAKE ONE TABLET BY MOUTH TWO TIMES A DAY, Disp: 60 tablet, Rfl: 0  •  Diclofenac Sodium (VOLTAREN) 1 % gel gel, diclofenac 1 % topical gel  APPLY 2 GRAMS TO THE AFFECTED AREA(S) FOUR TIMES A DAY, Disp: , Rfl:   •  losartan (COZAAR) 50 MG tablet, Take 1 tablet by mouth 2 (Two) Times a Day., Disp: 180 tablet, Rfl: 1  •  metoprolol succinate XL (TOPROL-XL) 200 MG 24 hr tablet, Take 1 tablet by mouth Daily., Disp: 90 tablet, Rfl: 1  •  nystatin (MYCOSTATIN) 425179 UNIT/GM powder, APPLY TO THE AFFECTED AREA(S) BY TOPICAL ROUTE 3 TIMES PER DAY, Disp: 60 g, Rfl: 2  •  oxyCODONE-acetaminophen (PERCOCET)  MG per tablet, TAKE 1 TABLET EVERY 8 HOURS BY ORAL ROUTE AS NEEDED FOR 30 DAYS., Disp: , Rfl:   •  pantoprazole (PROTONIX) 40 MG EC tablet, TAKE 1 TABLET (40 MG) BY ORAL ROUTE ONCE DAILY, Disp: 90 tablet, Rfl: 2  •  primidone (MYSOLINE) 50 MG tablet, Take 1 tablet by mouth 3 (Three) Times a Day., Disp: 270 tablet, Rfl: 1  •  Turmeric 500 MG capsule, Take 1,000 mg by mouth Daily., Disp: , Rfl:   •  Fluticasone Furoate-Vilanterol (Breo Ellipta) 200-25 MCG/INH inhaler, Inhale 1 puff Daily for 30 days. Rinse mouth out after each use., Disp: 1 each, Rfl: 11  •  tiotropium bromide monohydrate (Spiriva Respimat) 2.5 MCG/ACT aerosol solution inhaler, Inhale 2 puffs Daily for 30 days., Disp: 1 each, Rfl: 11    History:   Past Medical History:   Diagnosis Date   • Acid reflux disease    • Anemia 10/24/2016   • Anxiety    • Anxiety disorder 09/21/2017    HE STATES THAT HIS ANXIETY HAS BEEN WORSE. HE HAS GONE TO [facility] IN THE PAST AND TRIED SSRI'S W/O MUCH BENEFIT. HE STATES THAT KLONOPIN HELPED, BUT STOPPED GOING AND THEN STOPPED  KLONOPIN. RESTART KLONOPIN.    • Arthritis    • Arthritis     GENERALIZED  R KNEE   • Bipolar affect, depressed ([facility])    • Bipolar disorder ([facility])    • Cervical radiculopathy 12/15/2015   • Cervical spinal stenosis 12/15/2015   • Cervicalgia 04/03/2018   • Chronic bronchitis ([facility])    • Chronic pain of both knees 02/02/2018   • CKD (chronic kidney disease) stage 3, GFR 30-59 ml/min ([facility]) 02/02/2018   • COPD (chronic obstructive pulmonary disease) ([facility])    • COPD (chronic obstructive pulmonary disease) ([facility])     USES INHALERS   • Depression    • Esophageal reflux    • Essential hypertension 07/29/2019   • Folic acid deficiency 11/02/2017   • Foraminal stenosis of cervical region 05/10/2018   • Gastric reflux    • Head injury    • Hemorrhoids     RECTAL PROB (ACID REFLUX)   • Herniated disc, cervical 10/21/2014    C5-6, RIGHT   • Hyperlipidemia    • Hypertension    • Hypertension     CONTROLLED WITH MEDS   • Hyponatremia 10/03/2016, 11/9/2015   • Limb swelling    • Lung disease    • Major depressive disorder 07/29/2019   • Mediastinal adenopathy 10/03/2016   • Migraine    • MVP (mitral valve prolapse) 10/07/2015   • JIM (obstructive sleep apnea) 10/03/2016   • Osteoarthritis 07/29/2019   • Pneumonia    • Shortness of breath    • Slurred speech 12/01/2016   • SOB (shortness of breath)    • Tremor of right hand 10/07/2015   • Ulnar neuritis 10/18/2018    CLINICAL       Past Surgical History:   Procedure Laterality Date   • APPENDECTOMY     • APPENDECTOMY      45-50 YRS AGO   • BACK SURGERY     • CERVICAL DISC SURGERY  1996    INTERVERTEBRAL; C5-6 FUSION   • CERVICAL FUSION  11/05/2014, 3/11/2015    C5-6, C6-7   • COLONOSCOPY N/A 1/4/2022    Procedure: COLONOSCOPY;  Surgeon: Parviz Ash MD;  Location: [facility] ENDOSCOPY;  Service: General;  Laterality: N/A;   • JOINT REPLACEMENT      KNEE RIGHT   • NERVE SURGERY      LEFT ARM   • ORTHOPEDIC SURGERY Left     MIDDLE TRIGGER FINGER SX rtk)   • OTHER SURGICAL HISTORY Left      ARM SURGERY-NERVE DAMAGE REPAIR LEFT ARM    • SPINE SURGERY      SURGERY ON C-5-C6 IN  AND )       Social History     Socioeconomic History   • Marital status:    Tobacco Use   • Smoking status: Former Smoker     Packs/day: 1.00     Years: 50.00     Pack years: 50.00     Types: Cigarettes     Quit date: 2016     Years since quittin.8   • Smokeless tobacco: Never Used   Vaping Use   • Vaping Use: Never used   Substance and Sexual Activity   • Alcohol use: Yes     Comment: 1 or 2 a amonth    • Drug use: Never   • Sexual activity: Defer     Partners: Female        Family History   Problem Relation Age of Onset   • Heart disease Mother    • Stroke Mother    • Arthritis Mother    • Heart disease Father    • Heart disease Maternal Grandfather    • Cancer Brother    • Malig Hyperthermia Neg Hx        Objective     Physical Exam:  Vitals:    22 0933   BP: 158/92  Comment: PATIENT STATES THIS IS HIS NORM   BP Location: Left arm   Patient Position: Sitting   Cuff Size: Large Adult   Pulse: 57   Resp: 18   Temp: 97.5 °F (36.4 °C)   TempSrc: Temporal   SpO2: 96%   PainSc:   6   PainLoc: Generalized      There is no height or weight on file to calculate BMI.    Physical Exam  Constitutional:       Appearance: Normal appearance.   HENT:      Head: Normocephalic.   Cardiovascular:      Rate and Rhythm: Normal rate.      Pulses: Normal pulses.      Comments: Bilateral upper extremities: +2 palpable radial pulses.  Pulmonary:      Effort: Pulmonary effort is normal.   Musculoskeletal:         General: Normal range of motion.      Cervical back: Normal range of motion.   Skin:     General: Skin is warm and dry.      Capillary Refill: Capillary refill takes less than 2 seconds.      Comments: Scattered ecchymosis on upper extremities.  Neurological:      General: No focal deficit present.      Mental Status: He is alert and oriented to person, place, and time.   Psychiatric:         Mood and Affect: Mood  normal.         Behavior: Behavior normal.    Imaging/Labs:  I have reviewed the preliminary results of the abdominal ultrasound performed today, 5/25/2022.  The duplex today measures a 3 cm x 3.9 cm mid aorta.        Assessment / Plan      Assessment / Plan:  Diagnoses and all orders for this visit:    1. Abdominal aortic aneurysm (AAA) without rupture (HCC) (Primary)       Mr. Rios has a small abdominal aortic aneurysm that has been followed for several years.  The duplex today reveals a maximum diameter of 3 x 3.9 cm, previous studies had revealed a maximal diameter of 3.6 cm.  I recommended follow-up in 1 year with a repeat abdominal ultrasound.  I have answered all of his questions and he is in agreement with the plan at this time.    Thank you for allowing me to participate in your patient's care.        Follow Up:   Return for Annual AAA.   Or sooner for any further concerns or worsening sign and symptoms. If unable to reach us in the office please dial 911 or go to the nearest emergency department.      Griselda PEREZ  [facility] Vascular Surgery

## 2022-05-26 ENCOUNTER — OFFICE VISIT (OUTPATIENT)
Dept: PULMONOLOGY | Facility: CLINIC | Age: 72
End: 2022-05-26

## 2022-05-26 VITALS
DIASTOLIC BLOOD PRESSURE: 70 MMHG | BODY MASS INDEX: 27.77 KG/M2 | TEMPERATURE: 97.9 F | HEIGHT: 72 IN | SYSTOLIC BLOOD PRESSURE: 130 MMHG | HEART RATE: 62 BPM | OXYGEN SATURATION: 96 % | WEIGHT: 205 LBS | RESPIRATION RATE: 20 BRPM

## 2022-05-26 DIAGNOSIS — F17.211 CIGARETTE NICOTINE DEPENDENCE IN REMISSION: ICD-10-CM

## 2022-05-26 DIAGNOSIS — G47.33 OSA (OBSTRUCTIVE SLEEP APNEA): ICD-10-CM

## 2022-05-26 DIAGNOSIS — F17.201 TOBACCO ABUSE, IN REMISSION: ICD-10-CM

## 2022-05-26 DIAGNOSIS — R06.09 CHRONIC DYSPNEA: Primary | ICD-10-CM

## 2022-05-26 DIAGNOSIS — J43.9 PULMONARY EMPHYSEMA, UNSPECIFIED EMPHYSEMA TYPE: ICD-10-CM

## 2022-05-26 PROCEDURE — 99214 OFFICE O/P EST MOD 30 MIN: CPT | Performed by: NURSE PRACTITIONER

## 2022-05-26 RX ORDER — ALBUTEROL SULFATE 1.25 MG/3ML
1 SOLUTION RESPIRATORY (INHALATION) EVERY 6 HOURS PRN
Qty: 120 EACH | Refills: 11 | Status: SHIPPED | OUTPATIENT
Start: 2022-05-26 | End: 2022-06-25

## 2022-05-26 RX ORDER — BUDESONIDE, GLYCOPYRROLATE, AND FORMOTEROL FUMARATE 160; 9; 4.8 UG/1; UG/1; UG/1
2 AEROSOL, METERED RESPIRATORY (INHALATION) 2 TIMES DAILY
Qty: 1 EACH | Refills: 11 | Status: SHIPPED | OUTPATIENT
Start: 2022-05-26 | End: 2022-06-25

## 2022-05-26 RX ORDER — ALBUTEROL SULFATE 90 UG/1
2 AEROSOL, METERED RESPIRATORY (INHALATION) EVERY 4 HOURS PRN
Qty: 18 G | Refills: 11 | Status: SHIPPED | OUTPATIENT
Start: 2022-05-26 | End: 2022-06-25

## 2022-05-31 ENCOUNTER — TELEPHONE (OUTPATIENT)
Dept: PULMONOLOGY | Facility: CLINIC | Age: 72
End: 2022-05-31

## 2022-05-31 ENCOUNTER — LAB (OUTPATIENT)
Dept: LAB | Facility: HOSPITAL | Age: 72
End: 2022-05-31

## 2022-05-31 DIAGNOSIS — I10 ESSENTIAL HYPERTENSION: ICD-10-CM

## 2022-05-31 DIAGNOSIS — E55.9 VITAMIN D DEFICIENCY: ICD-10-CM

## 2022-05-31 LAB
25(OH)D3 SERPL-MCNC: 49.3 NG/ML (ref 30–100)
ALBUMIN SERPL-MCNC: 4.2 G/DL (ref 3.5–5.2)
ALBUMIN/GLOB SERPL: 1.1 G/DL
ALP SERPL-CCNC: 95 U/L (ref 39–117)
ALT SERPL W P-5'-P-CCNC: 20 U/L (ref 1–41)
ANION GAP SERPL CALCULATED.3IONS-SCNC: 10.6 MMOL/L (ref 5–15)
AST SERPL-CCNC: 22 U/L (ref 1–40)
BILIRUB SERPL-MCNC: 0.2 MG/DL (ref 0–1.2)
BILIRUB UR QL STRIP: NEGATIVE
BUN SERPL-MCNC: 15 MG/DL (ref 8–23)
BUN/CREAT SERPL: 14.3 (ref 7–25)
CALCIUM SPEC-SCNC: 9.5 MG/DL (ref 8.6–10.5)
CHLORIDE SERPL-SCNC: 101 MMOL/L (ref 98–107)
CHOLEST SERPL-MCNC: 150 MG/DL (ref 0–200)
CLARITY UR: CLEAR
CO2 SERPL-SCNC: 23.4 MMOL/L (ref 22–29)
COLOR UR: YELLOW
CREAT SERPL-MCNC: 1.05 MG/DL (ref 0.76–1.27)
DEPRECATED RDW RBC AUTO: 44.5 FL (ref 37–54)
EGFRCR SERPLBLD CKD-EPI 2021: 76.8 ML/MIN/1.73
ERYTHROCYTE [DISTWIDTH] IN BLOOD BY AUTOMATED COUNT: 13.3 % (ref 12.3–15.4)
GLOBULIN UR ELPH-MCNC: 3.7 GM/DL
GLUCOSE SERPL-MCNC: 111 MG/DL (ref 65–99)
GLUCOSE UR STRIP-MCNC: NEGATIVE MG/DL
HCT VFR BLD AUTO: 46.3 % (ref 37.5–51)
HDLC SERPL QL: 5
HDLC SERPL-MCNC: 30 MG/DL (ref 40–60)
HGB BLD-MCNC: 15.6 G/DL (ref 13–17.7)
HGB UR QL STRIP.AUTO: NEGATIVE
KETONES UR QL STRIP: NEGATIVE
LDLC SERPL CALC-MCNC: 84 MG/DL (ref 0–100)
LEUKOCYTE ESTERASE UR QL STRIP.AUTO: NEGATIVE
MCH RBC QN AUTO: 30.4 PG (ref 26.6–33)
MCHC RBC AUTO-ENTMCNC: 33.7 G/DL (ref 31.5–35.7)
MCV RBC AUTO: 90.1 FL (ref 79–97)
NITRITE UR QL STRIP: NEGATIVE
PH UR STRIP.AUTO: 7 [PH] (ref 5–8)
PLATELET # BLD AUTO: 272 10*3/MM3 (ref 140–450)
PMV BLD AUTO: 10.1 FL (ref 6–12)
POTASSIUM SERPL-SCNC: 4.5 MMOL/L (ref 3.5–5.2)
PROT SERPL-MCNC: 7.9 G/DL (ref 6–8.5)
PROT UR QL STRIP: NEGATIVE
RBC # BLD AUTO: 5.14 10*6/MM3 (ref 4.14–5.8)
SODIUM SERPL-SCNC: 135 MMOL/L (ref 136–145)
SP GR UR STRIP: 1.01 (ref 1–1.03)
TRIGL SERPL-MCNC: 213 MG/DL (ref 0–150)
UROBILINOGEN UR QL STRIP: NORMAL
VLDLC SERPL-MCNC: 36 MG/DL (ref 5–40)
WBC NRBC COR # BLD: 7.81 10*3/MM3 (ref 3.4–10.8)

## 2022-05-31 PROCEDURE — 80061 LIPID PANEL: CPT

## 2022-05-31 PROCEDURE — 80053 COMPREHEN METABOLIC PANEL: CPT

## 2022-05-31 PROCEDURE — 85027 COMPLETE CBC AUTOMATED: CPT

## 2022-05-31 PROCEDURE — 82306 VITAMIN D 25 HYDROXY: CPT

## 2022-05-31 PROCEDURE — 36415 COLL VENOUS BLD VENIPUNCTURE: CPT

## 2022-05-31 PROCEDURE — 81003 URINALYSIS AUTO W/O SCOPE: CPT

## 2022-05-31 NOTE — TELEPHONE ENCOUNTER
----- Message from CHRIS Saxena sent at 5/25/2022  4:08 PM EDT -----  Please request copy of CPAP compliance report.  Patient is scheduled to come in tomorrow.

## 2022-06-02 ENCOUNTER — PATIENT OUTREACH (OUTPATIENT)
Dept: CASE MANAGEMENT | Facility: OTHER | Age: 72
End: 2022-06-02

## 2022-06-02 ENCOUNTER — TELEPHONE (OUTPATIENT)
Dept: CASE MANAGEMENT | Facility: OTHER | Age: 72
End: 2022-06-02

## 2022-06-02 DIAGNOSIS — I10 ESSENTIAL HYPERTENSION: Primary | ICD-10-CM

## 2022-06-02 DIAGNOSIS — F41.9 ANXIETY: ICD-10-CM

## 2022-06-02 PROCEDURE — 99490 CHRNC CARE MGMT STAFF 1ST 20: CPT | Performed by: NURSE PRACTITIONER

## 2022-06-02 RX ORDER — NALOXONE HYDROCHLORIDE 4 MG/.1ML
SPRAY NASAL
COMMUNITY

## 2022-06-02 NOTE — OUTREACH NOTE
AMBULATORY CASE MANAGEMENT NOTE    Name and Relationship of Patient/Support Person:  -     CCM Interim Update    Reviewed chart prior to calling patient for outreach. Patient said he was doing okay. He called and requested to be put on Wellbutrin on 4/28, it is something that he has been on before and had success with. However he said that he didn't feel like it was helping much, will send message to PCP to see about getting it increased.   On 5/26 he seen Pulmonology and stopped his Breo and Spiriva, started him on Breztri. Asked if he felt that was helping and he said that he just picked it up yesterday from the pharmacy so he wasn't sure. Advised to clean his mouth out after he uses it. The pulmonology office called [facility] and there is a delay in getting his C-PAP.   Asked how his blood pressure has been and he said about 130's/80's, advised him to bring that log with him to his appointment with PCP next week.   Talked about eating less fried, fatty foods and more grilled, or baked things because of his triglycerides.   He continues to see pain management, states he isn't able to really exercise, we talked about doing things from the chair.   Mailed education about that to him.     Education Documentation  Self-Care, taught by Christina Zayas, RN at 6/2/2022  3:37 PM.  Learner: Patient  Readiness: Acceptance  Method: Explanation  Response: Verbalizes Understanding    Provider Follow-Up, taught by Christina Zayas, RN at 6/2/2022  3:37 PM.  Learner: Patient  Readiness: Acceptance  Method: Explanation  Response: Verbalizes Understanding    Blood Pressure Monitoring, taught by Christina Zayas, RN at 6/2/2022  3:37 PM.  Learner: Patient  Readiness: Acceptance  Method: Explanation  Response: Verbalizes Understanding    Description, taught by Christina Zayas, RN at 6/2/2022  3:37 PM.  Learner: Patient  Readiness: Acceptance  Method: Explanation  Response: Verbalizes Understanding          D  Ambulatory Case Management  6/2/2022, 15:37 EDT

## 2022-06-06 ENCOUNTER — TELEPHONE (OUTPATIENT)
Dept: CASE MANAGEMENT | Facility: OTHER | Age: 72
End: 2022-06-06

## 2022-06-06 NOTE — TELEPHONE ENCOUNTER
Jagdeep Bonner was asking if he wants to increase his Wellbutrin dose to 300 mg, I called and talked to him about it and said that he would like to try that, sent message back to PCP telling her yes.

## 2022-06-08 RX ORDER — BUPROPION HYDROCHLORIDE 300 MG/1
300 TABLET ORAL DAILY
Qty: 90 TABLET | Refills: 1 | Status: SHIPPED | OUTPATIENT
Start: 2022-06-08 | End: 2022-06-09 | Stop reason: SDUPTHER

## 2022-06-09 ENCOUNTER — OFFICE VISIT (OUTPATIENT)
Dept: FAMILY MEDICINE CLINIC | Facility: CLINIC | Age: 72
End: 2022-06-09

## 2022-06-09 VITALS
OXYGEN SATURATION: 98 % | SYSTOLIC BLOOD PRESSURE: 136 MMHG | BODY MASS INDEX: 30.23 KG/M2 | TEMPERATURE: 97.6 F | DIASTOLIC BLOOD PRESSURE: 84 MMHG | WEIGHT: 223.2 LBS | HEART RATE: 53 BPM | HEIGHT: 72 IN | RESPIRATION RATE: 16 BRPM

## 2022-06-09 DIAGNOSIS — R25.1 TREMOR: ICD-10-CM

## 2022-06-09 DIAGNOSIS — E78.2 MIXED HYPERLIPIDEMIA: ICD-10-CM

## 2022-06-09 DIAGNOSIS — I10 ESSENTIAL HYPERTENSION: ICD-10-CM

## 2022-06-09 DIAGNOSIS — F41.9 ANXIETY: ICD-10-CM

## 2022-06-09 DIAGNOSIS — M25.571 ACUTE RIGHT ANKLE PAIN: ICD-10-CM

## 2022-06-09 DIAGNOSIS — R68.89 FORGETFULNESS: Primary | ICD-10-CM

## 2022-06-09 PROCEDURE — 99213 OFFICE O/P EST LOW 20 MIN: CPT | Performed by: NURSE PRACTITIONER

## 2022-06-09 PROCEDURE — 1170F FXNL STATUS ASSESSED: CPT | Performed by: NURSE PRACTITIONER

## 2022-06-09 PROCEDURE — 1159F MED LIST DOCD IN RCRD: CPT | Performed by: NURSE PRACTITIONER

## 2022-06-09 PROCEDURE — G0439 PPPS, SUBSEQ VISIT: HCPCS | Performed by: NURSE PRACTITIONER

## 2022-06-09 RX ORDER — METOPROLOL SUCCINATE 200 MG/1
200 TABLET, EXTENDED RELEASE ORAL DAILY
Qty: 90 TABLET | Refills: 1 | Status: SHIPPED | OUTPATIENT
Start: 2022-06-09 | End: 2022-12-14 | Stop reason: SDUPTHER

## 2022-06-09 RX ORDER — AMLODIPINE BESYLATE 5 MG/1
5 TABLET ORAL DAILY
Qty: 90 TABLET | Refills: 1 | Status: SHIPPED | OUTPATIENT
Start: 2022-06-09 | End: 2022-11-11

## 2022-06-09 RX ORDER — BUPROPION HYDROCHLORIDE 300 MG/1
300 TABLET ORAL DAILY
Qty: 90 TABLET | Refills: 1 | Status: SHIPPED | OUTPATIENT
Start: 2022-06-09 | End: 2022-12-14 | Stop reason: SDUPTHER

## 2022-06-09 RX ORDER — ATORVASTATIN CALCIUM 10 MG/1
10 TABLET, FILM COATED ORAL DAILY
Qty: 90 TABLET | Refills: 1 | Status: SHIPPED | OUTPATIENT
Start: 2022-06-09 | End: 2022-09-01

## 2022-06-09 RX ORDER — PRIMIDONE 50 MG/1
50 TABLET ORAL 3 TIMES DAILY
Qty: 270 TABLET | Refills: 1 | Status: SHIPPED | OUTPATIENT
Start: 2022-06-09 | End: 2022-12-14 | Stop reason: SDUPTHER

## 2022-06-09 RX ORDER — PANTOPRAZOLE SODIUM 40 MG/1
40 TABLET, DELAYED RELEASE ORAL DAILY
Qty: 90 TABLET | Refills: 1 | Status: SHIPPED | OUTPATIENT
Start: 2022-06-09 | End: 2022-11-11

## 2022-06-09 RX ORDER — LOSARTAN POTASSIUM 50 MG/1
50 TABLET ORAL 2 TIMES DAILY
Qty: 180 TABLET | Refills: 1 | Status: SHIPPED | OUTPATIENT
Start: 2022-06-09 | End: 2022-11-11

## 2022-06-09 NOTE — PROGRESS NOTES
The ABCs of the Annual Wellness Visit  Subsequent Medicare Wellness Visit    Chief Complaint   Patient presents with   • Medicare Wellness-subsequent      Subjective    History of Present Illness:  Jose Rios is a 71 y.o. male who presents for a Subsequent Medicare Wellness Visit.    Blood pressure doing well currently.     wellbutrin working well.    Balance issues resolved currently.    States forgets appts at times but has noticed that since hit head about 9 months ago .  Feels like may be getting a little better.      Right foot and ankle has started hurting worse at night.  Doesn't remember an injury      The following portions of the patient's history were reviewed and   updated as appropriate: allergies, current medications, past family history, past medical history, past social history, past surgical history and problem list.    Compared to one year ago, the patient feels his physical   health is the same.    Compared to one year ago, the patient feels his mental   health is the same.    Recent Hospitalizations:  He was not admitted to the hospital during the last year.       Current Medical Providers:  Patient Care Team:  Jagdeep Bonner APRN as PCP - General (Nurse Practitioner)  Sushila Zayas RN as Ambulatory  (Population Health)  Rufino Weir DO as Consulting Physician (Pulmonary Disease)  Yamil Colin MD as Consulting Physician (Neurology)  Tayla Edward PA as Physician Assistant (Neurosurgery)  González Zapata MD as Surgeon (Orthopedic Surgery)  Gina Adams (Pain Medicine)  Kylah Baumann, APRLILIAN as Nurse Practitioner (Nurse Practitioner)    Outpatient Medications Prior to Visit   Medication Sig Dispense Refill   • Acetaminophen (Tylenol) 325 MG capsule Tylenol     • albuterol (ACCUNEB) 1.25 MG/3ML nebulizer solution Take 3 mL by nebulization Every 6 (Six) Hours As Needed for Wheezing or Shortness of Air for up to 30 days. 120 each 11   •  albuterol sulfate  (90 Base) MCG/ACT inhaler Inhale 2 puffs Every 4 (Four) Hours As Needed for Wheezing or Shortness of Air for up to 30 days. 18 g 11   • Budeson-Glycopyrrol-Formoterol (Breztri Aerosphere) 160-9-4.8 MCG/ACT aerosol inhaler Inhale 2 puffs 2 (Two) Times a Day for 30 days. Rinse mouth out after each use 1 each 11   • chlorpheniramine (CHLOR-TRIMETON) 4 MG tablet Take 4 mg by mouth Every 6 (Six) Hours As Needed for Allergies.     • clonazePAM (KlonoPIN) 1 MG tablet TAKE ONE TABLET BY MOUTH TWO TIMES A DAY 60 tablet 0   • naloxone (NARCAN) 4 MG/0.1ML nasal spray naloxone 4 mg/actuation nasal spray   ONE SPRAY IN ONE NOSTRIL AS NEEDED FOR OVER SEDATION OR RESPIRATORY DEPRESSION FROM OPIOID USE. REPEAT ONE SPRAY IN ALTERNATE NOSTRIL EV<MORE>     • nystatin (MYCOSTATIN) 519472 UNIT/GM powder APPLY TO THE AFFECTED AREA(S) BY TOPICAL ROUTE 3 TIMES PER DAY 60 g 2   • oxyCODONE-acetaminophen (PERCOCET)  MG per tablet TAKE 1 TABLET EVERY 8 HOURS BY ORAL ROUTE AS NEEDED FOR 30 DAYS.     • Turmeric 500 MG capsule Take 1,000 mg by mouth Daily.     • amLODIPine (NORVASC) 5 MG tablet Take 1 tablet by mouth Daily. 90 tablet 1   • atorvastatin (LIPITOR) 10 MG tablet Take 1 tablet by mouth Daily. 90 tablet 1   • buPROPion XL (Wellbutrin XL) 300 MG 24 hr tablet Take 1 tablet by mouth Daily. 90 tablet 1   • Diclofenac Sodium (VOLTAREN) 1 % gel gel diclofenac 1 % topical gel   APPLY 2 GRAMS TO THE AFFECTED AREA(S) FOUR TIMES A DAY     • losartan (COZAAR) 50 MG tablet Take 1 tablet by mouth 2 (Two) Times a Day. 180 tablet 1   • metoprolol succinate XL (TOPROL-XL) 200 MG 24 hr tablet Take 1 tablet by mouth Daily. 90 tablet 1   • pantoprazole (PROTONIX) 40 MG EC tablet TAKE 1 TABLET (40 MG) BY ORAL ROUTE ONCE DAILY 90 tablet 2   • primidone (MYSOLINE) 50 MG tablet Take 1 tablet by mouth 3 (Three) Times a Day. 270 tablet 1     No facility-administered medications prior to visit.       Opioid medication/s are on  "active medication list.  and I have evaluated his active treatment plan and pain score trends (see table).  Vitals:    06/09/22 1130   PainSc:   6   PainLoc: Back     I have reviewed the chart for potential of high risk medication and harmful drug interactions in the elderly.            Aspirin is not on active medication list.  Aspirin use is not indicated based on review of current medical condition/s. Risk of harm outweighs potential benefits.  .    Patient Active Problem List   Diagnosis   • Anemia   • Anxiety disorder   • Chronic obstructive pulmonary disease (HCC)   • CKD (chronic kidney disease) stage 3, GFR 30-59 ml/min (HCC)   • Essential hypertension   • Hyperlipidemia   • Hyponatremia   • Major depressive disorder   • MVP (mitral valve prolapse)   • JIM (obstructive sleep apnea)   • Osteoarthritis   • Depression   • Centrilobular emphysema (HCC)   • DDD (degenerative disc disease), cervical   • Spondylosis without myelopathy   • Tremor   • Vitamin D deficiency   • Tobacco abuse, in remission   • Chronic dyspnea     Advance Care Planning  Advance Directive is not on file.  ACP discussion was held with the patient during this visit. Patient does not have an advance directive, information provided.    Review of Systems   Constitutional: Negative for fever.   Respiratory: Negative for cough, chest tightness and shortness of breath.    Cardiovascular: Negative for chest pain.        Objective    Vitals:    06/09/22 1130   BP: 136/84   BP Location: Right arm   Patient Position: Sitting   Cuff Size: Large Adult   Pulse: 53   Resp: 16   Temp: 97.6 °F (36.4 °C)   TempSrc: Infrared   SpO2: 98%   Weight: 101 kg (223 lb 3.2 oz)   Height: 182.9 cm (72.01\")   PainSc:   6   PainLoc: Back     Estimated body mass index is 30.26 kg/m² as calculated from the following:    Height as of this encounter: 182.9 cm (72.01\").    Weight as of this encounter: 101 kg (223 lb 3.2 oz).    BMI is >= 30 and <35. (Class 1 Obesity). The " following options were offered after discussion;: weight loss educational material (shared in after visit summary)      Does the patient have evidence of cognitive impairment? No    Physical Exam  Vitals reviewed.   Constitutional:       Appearance: Normal appearance. He is well-developed.   HENT:      Head: Normocephalic and atraumatic.      Mouth/Throat:      Pharynx: No oropharyngeal exudate.   Eyes:      Conjunctiva/sclera: Conjunctivae normal.      Pupils: Pupils are equal, round, and reactive to light.   Cardiovascular:      Rate and Rhythm: Normal rate and regular rhythm.      Heart sounds: Normal heart sounds. No murmur heard.    No friction rub. No gallop.   Pulmonary:      Effort: Pulmonary effort is normal.      Breath sounds: Normal breath sounds. No wheezing or rhonchi.   Musculoskeletal:        Legs:       Comments: Tenderness to palpation of ankle.   Skin:     General: Skin is warm and dry.   Neurological:      Mental Status: He is alert and oriented to person, place, and time.   Psychiatric:         Mood and Affect: Mood and affect normal.         Behavior: Behavior normal.         Thought Content: Thought content normal.         Judgment: Judgment normal.       Lab Results   Component Value Date    TRIG 213 (H) 2022    HDL 30 (L) 2022    LDL 84 2022    VLDL 36 2022            HEALTH RISK ASSESSMENT    Smoking Status:  Social History     Tobacco Use   Smoking Status Former Smoker   • Packs/day: 1.00   • Years: 50.00   • Pack years: 50.00   • Types: Cigarettes   • Quit date: 2016   • Years since quittin.8   Smokeless Tobacco Never Used     Alcohol Consumption:  Social History     Substance and Sexual Activity   Alcohol Use Yes    Comment: 1 or 2 a amonth      Fall Risk Screen:    STEADI Fall Risk Assessment was completed, and patient is at HIGH risk for falls. Assessment completed on:2022    Depression Screening:  PHQ-2/PHQ-9 Depression Screening 2022   Retired  PHQ-9 Total Score -   Retired Total Score -   Little Interest or Pleasure in Doing Things 0-->not at all   Feeling Down, Depressed or Hopeless 0-->not at all   PHQ-9: Brief Depression Severity Measure Score 0       Health Habits and Functional and Cognitive Screening:  Functional & Cognitive Status 6/9/2022   Do you have difficulty preparing food and eating? No   Do you have difficulty bathing yourself, getting dressed or grooming yourself? No   Do you have difficulty using the toilet? No   Do you have difficulty moving around from place to place? No   Do you have trouble with steps or getting out of a bed or a chair? No   Current Diet Unhealthy Diet   Dental Exam Up to date   Eye Exam Not up to date   Exercise (times per week) 0 times per week   Current Exercises Include No Regular Exercise   Current Exercise Activities Include -   Do you need help using the phone?  No   Are you deaf or do you have serious difficulty hearing?  Yes   Do you need help with transportation? No   Do you need help shopping? No   Do you need help preparing meals?  No   Do you need help with housework?  No   Do you need help with laundry? No   Do you need help taking your medications? No   Do you need help managing money? No   Do you ever drive or ride in a car without wearing a seat belt? No   Have you felt unusual stress, anger or loneliness in the last month? No   Who do you live with? Spouse   If you need help, do you have trouble finding someone available to you? No   Have you been bothered in the last four weeks by sexual problems? No   Do you have difficulty concentrating, remembering or making decisions? Yes       Age-appropriate Screening Schedule:  Refer to the list below for future screening recommendations based on patient's age, sex and/or medical conditions. Orders for these recommended tests are listed in the plan section. The patient has been provided with a written plan.    Health Maintenance   Topic Date Due   • INFLUENZA  VACCINE  08/01/2022   • LIPID PANEL  05/31/2023   • TDAP/TD VACCINES (2 - Td or Tdap) 06/07/2027   • ZOSTER VACCINE  Completed              Assessment & Plan   CMS Preventative Services Quick Reference  Risk Factors Identified During Encounter  Hearing Problem  The above risks/problems have been discussed with the patient.  Follow up actions/plans if indicated are seen below in the Assessment/Plan Section.  Pertinent information has been shared with the patient in the After Visit Summary.    Diagnoses and all orders for this visit:    1. Forgetfulness (Primary)  Comments:  states since hit head months ago.  States may have gotten a little better.    2. Essential hypertension  -     amLODIPine (NORVASC) 5 MG tablet; Take 1 tablet by mouth Daily.  Dispense: 90 tablet; Refill: 1  -     losartan (COZAAR) 50 MG tablet; Take 1 tablet by mouth 2 (Two) Times a Day.  Dispense: 180 tablet; Refill: 1  -     metoprolol succinate XL (TOPROL-XL) 200 MG 24 hr tablet; Take 1 tablet by mouth Daily.  Dispense: 90 tablet; Refill: 1    3. Mixed hyperlipidemia  -     atorvastatin (LIPITOR) 10 MG tablet; Take 1 tablet by mouth Daily.  Dispense: 90 tablet; Refill: 1    4. Anxiety  -     buPROPion XL (Wellbutrin XL) 300 MG 24 hr tablet; Take 1 tablet by mouth Daily.  Dispense: 90 tablet; Refill: 1    5. Essential hypertension  Comments:  Increase metoprolol to 200mg and watch pressures at home and if drops or gets dizzy will adjust  Orders:  -     amLODIPine (NORVASC) 5 MG tablet; Take 1 tablet by mouth Daily.  Dispense: 90 tablet; Refill: 1  -     losartan (COZAAR) 50 MG tablet; Take 1 tablet by mouth 2 (Two) Times a Day.  Dispense: 180 tablet; Refill: 1  -     metoprolol succinate XL (TOPROL-XL) 200 MG 24 hr tablet; Take 1 tablet by mouth Daily.  Dispense: 90 tablet; Refill: 1    6. Tremor  -     primidone (MYSOLINE) 50 MG tablet; Take 1 tablet by mouth 3 (Three) Times a Day.  Dispense: 270 tablet; Refill: 1    Other orders  -      pantoprazole (PROTONIX) 40 MG EC tablet; Take 1 tablet by mouth Daily.  Dispense: 90 tablet; Refill: 1  -     Diclofenac Sodium (VOLTAREN) 1 % gel gel; Apply  topically to the appropriate area as directed 2 (Two) Times a Day.  Dispense: 150 g; Refill: 2    to go to audiologist for exam    Follow Up:   Return in about 6 months (around 12/9/2022).     An After Visit Summary and PPPS were made available to the patient.

## 2022-06-15 DIAGNOSIS — F41.9 ANXIETY: ICD-10-CM

## 2022-06-15 RX ORDER — CLONAZEPAM 1 MG/1
TABLET ORAL
Qty: 60 TABLET | Refills: 0 | Status: SHIPPED | OUTPATIENT
Start: 2022-06-15 | End: 2022-07-08

## 2022-06-24 ENCOUNTER — APPOINTMENT (OUTPATIENT)
Dept: CT IMAGING | Facility: HOSPITAL | Age: 72
End: 2022-06-24

## 2022-06-30 ENCOUNTER — PATIENT OUTREACH (OUTPATIENT)
Dept: CASE MANAGEMENT | Facility: OTHER | Age: 72
End: 2022-06-30

## 2022-06-30 DIAGNOSIS — J44.9 CHRONIC OBSTRUCTIVE PULMONARY DISEASE, UNSPECIFIED COPD TYPE: ICD-10-CM

## 2022-06-30 DIAGNOSIS — I10 ESSENTIAL HYPERTENSION: Primary | ICD-10-CM

## 2022-06-30 NOTE — OUTREACH NOTE
[facility] End of Month Documentation    This Chronic Medical Management Care Plan for Jose Rios, 71 y.o. male, has been monitored and managed; reviewed and a new plan of care implemented for the month of June.  A cumulative time of 28  minutes was spent on this patient record this month, including phone call with patient; chart review; electronic communication with primary care provider.    Regarding the patient's problems: has Anemia; Anxiety disorder; Chronic obstructive pulmonary disease (HCC); CKD (chronic kidney disease) stage 3, GFR 30-59 ml/min (HCC); Essential hypertension; Hyperlipidemia; Hyponatremia; Major depressive disorder; MVP (mitral valve prolapse); JIM (obstructive sleep apnea); Osteoarthritis; Depression; Centrilobular emphysema (HCC); DDD (degenerative disc disease), cervical; Spondylosis without myelopathy; Tremor; Vitamin D deficiency; Tobacco abuse, in remission; and Chronic dyspnea on their problem list., the following items were addressed: medical records; medications and any changes can be found within the plan section of the note.  A detailed listing of time spent for chronic care management is tracked within each outreach encounter.  Current medications include:  has a current medication list which includes the following prescription(s): tylenol, amlodipine, atorvastatin, bupropion xl, chlorpheniramine, clonazepam, diclofenac sodium, losartan, metoprolol succinate xl, naloxone, nystatin, oxycodone-acetaminophen, pantoprazole, primidone, and turmeric. and the patient is reported to be patient is compliant with medication protocol,  Medications are reported to be non-effective in controlling symptoms and changes have been made to the medication protocol.  Regarding these diagnoses no new referrals were made.  All notes on chart for PCP to review.    The patient was monitored remotely for blood pressure; medications; weight; activity level; pain; mood & behavior.    The patient's physical  needs include:  medication education; physical healthcare.     The patient's mental support needs include:  needs met    The patient's cognitive support needs include:  medication; health care    The patient's psychosocial support needs include:  needs met    The patient's functional needs include: physical healthcare    The patient's environmental needs include:  not applicable    Care Plan overall comments:  N/A    Refer to previous outreach notes for more information on the areas listed above.    Monthly Billing Diagnoses  (I10) Essential hypertension    (J44.9) Chronic obstructive pulmonary disease, unspecified COPD type (HCC)    Medications   · Medications have been reconciled    Care Plan progress this month:      Recently Modified Care Plans Updates made since 5/30/2022 12:00 AM     Hypertension (Adult)         Problem Priority Last Modified     ELS DISEASE PROGRESSION (HYPERTENSION) (ADULT) --  3/30/2022  4:23 PM by Sushila Zayas RN              Goal Recent Progress Last Modified     Disease Progression Prevented or Minimized --  3/30/2022  4:23 PM by Sushila Zayas RN     Evidence-based guidance:   Tailor lifestyle advice to individual; review progress regularly; give frequent encouragement and respond positively to incremental successes.   Assess for and promote awareness of worsening disease or development of comorbidity.   Prepare patient for laboratory and diagnostic exams based on risk and presentation.   Prepare patient for use of pharmacologic therapy that may include diuretic, beta-blocker, beta-blocker/thiazide combination, angiotensin-converting enzyme inhibitor, renin-angiotensin blocker or calcium-channel blocker.   Expect periodic adjustments to pharmacologic therapy; manage side effects.   Promote a healthy diet that includes primarily plant-based foods, such as fruits, vegetables, whole grains, beans and legumes, low-fat dairy and lean meats.    Consider moderate reduction in  sodium intake by avoiding the addition of salt to prepared foods and limiting processed meats, canned soup, frozen meals and salty snacks.    Promote a regular, daily exercise goal of 150 minutes per week of moderate exercise based on tolerance, ability and patient choice; consider referral to physical therapist, community wellness and/or activity program.   Encourage the avoidance of no more than 2 hours per day of sedentary activity, such as recreational screen time.   Review sources of stress; explore current coping strategies and encourage use of mindfulness, yoga, meditation or exercise to manage stress.    Notes:              Task Due Date Last Modified     Alleviate Barriers to Hypertension Treatment --  6/2/2022  3:49 PM by Sushila Zayas, RN     Care Management Activities:      - healthy diet promoted      Notes:                Problem Priority Last Modified     ELS RESISTANT HYPERTENSION (HYPERTENSION) (ADULT) --  3/30/2022  4:23 PM by Sushila Zayas, RN              Goal Recent Progress Last Modified     Response to Treatment Maximized --  3/30/2022  4:23 PM by Sushila Zayas, RN     Evidence-based guidance:   Assess patient response to treatment, including presence or absence of medication side effects, degree of blood pressure control and patient satisfaction.   Assess technique (including cuff size and placement), measurement times, condition and calibration of blood pressure cuff set (both at-home and in-office equipment).   Assess factors that may influence response to treatment, including nonadherence to pharmacologic treatment plan, diet or activity changes and/or presence of pain, stress or sleep disturbance.   Screen for signs and symptoms of depression; if present, refer for or complete a comprehensive assessment.   Evaluate social and economic barriers that may affect adherence to treatment plan   Address pharmacologic nonadherence by simplifying dosing regimen, counseling or  support by pharmacist, financial assistance, self-monitoring of blood pressure, use of motivational interviewing, voice or text messages.   Encourage behavioral adherence strategies, like habit-based interventions that link medication taking with existing daily routines.   Assess barriers to regular, daily physical activity; support family or support person-oriented activity changes and utilization of community activity or sports program.   Address barriers to dietary changes, especially sodium restriction, with referrals to community programs, like cooking classes, meal services or intensive education when available.   Refer to community-based peer support program or nurse home-visiting program.   Assess for chronic pain; when present add additional goals (Chronic Pain Care Plan Guide) as needed.   Provide frequent follow-up by telephone, telemonitoring, patient-practice portal or with home visit.   Review alcohol use screen; address using brief intervention beginning with risk that interferes with blood pressure control; refer for treatment when excessive alcohol use is noted.   Screen for obstructive sleep apnea; prepare patient for polysomnography based on risk and presentation and use of noninvasive ventilation to relieve obstructive sleep apnea when present.    Notes:              Task Due Date Last Modified     Facilitate Adherence to Lifestyle Change --  6/2/2022  3:49 PM by Sushila Zayas, RN     Care Management Activities:      - medication adherence assessment completed  - pain assessed and managed  - support and encouragement provided      Notes:                      Instructions   · Patient was provided an electronic copy of care plan  · CCM services were explained and offered and patient has accepted these services.  · Patient has given their written consent to receive CCM services and understands that this includes the authorization of electronic communication of medical information with the  other treating providers.  · Patient understands that they may stop CCM services at any time and these changes will be effective at the end of the calendar month and will effectively revocate the agreement of CCM services.  · Patient understands that only one practitioner can furnish and be paid for CCM services during one calendar month.  Patient also understands that there may be co-payment or deductible fees in association with CCM services.  · Patient will continue with at least monthly follow-up calls with the Nurse Navigator.    CHRISTINA ARMSTRONG  Ambulatory Case Management    6/30/2022, 11:34 EDT

## 2022-07-08 DIAGNOSIS — F41.9 ANXIETY: ICD-10-CM

## 2022-07-08 RX ORDER — CLONAZEPAM 1 MG/1
TABLET ORAL
Qty: 60 TABLET | Refills: 0 | Status: SHIPPED | OUTPATIENT
Start: 2022-07-08 | End: 2022-08-08

## 2022-07-15 ENCOUNTER — HOSPITAL ENCOUNTER (OUTPATIENT)
Dept: CT IMAGING | Facility: HOSPITAL | Age: 72
Discharge: HOME | End: 2022-07-15
Admitting: NURSE PRACTITIONER

## 2022-07-15 DIAGNOSIS — F17.201 TOBACCO ABUSE, IN REMISSION: ICD-10-CM

## 2022-07-15 DIAGNOSIS — F17.211 CIGARETTE NICOTINE DEPENDENCE IN REMISSION: ICD-10-CM

## 2022-07-15 PROCEDURE — 71271 CT THORAX LUNG CANCER SCR C-: CPT

## 2022-07-18 DIAGNOSIS — R06.02 SHORTNESS OF BREATH: ICD-10-CM

## 2022-07-18 DIAGNOSIS — J84.10 PULMONARY FIBROSIS: Primary | ICD-10-CM

## 2022-07-18 DIAGNOSIS — J30.89 OTHER ALLERGIC RHINITIS: ICD-10-CM

## 2022-07-18 DIAGNOSIS — J44.9 CHRONIC OBSTRUCTIVE PULMONARY DISEASE, UNSPECIFIED COPD TYPE: ICD-10-CM

## 2022-07-21 ENCOUNTER — PATIENT OUTREACH (OUTPATIENT)
Dept: CASE MANAGEMENT | Facility: OTHER | Age: 72
End: 2022-07-21

## 2022-07-21 DIAGNOSIS — I10 ESSENTIAL HYPERTENSION: Primary | ICD-10-CM

## 2022-07-21 PROCEDURE — 99490 CHRNC CARE MGMT STAFF 1ST 20: CPT | Performed by: NURSE PRACTITIONER

## 2022-07-21 NOTE — OUTREACH NOTE
AMBULATORY CASE MANAGEMENT NOTE    Name and Relationship of Patient/Support Person: Jose Rios E - Self    Reviewed chart prior to calling patient for outreach, he said he was doing about the same.   He seen PCP on 6/9 and she increased his Metoprolol to 200 mg daily, at that OV his BP was 136/84. He said his blood pressure has been good, he has not had any low readings or dizziness. He did not have a 6 month follow-up office visit scheduled with PCP, so made that for December.    His Wellbutrin was increased around 6/2 and he states that seems to be helping him.   Asked how his breathing was, if he was having any issues and he said that he wasn't. He seen Pulmonology last on 5/26 and provider stopped his Breo and Spiriva, and stated Breztri. Had CT of chest for cancer screening which was good.   Asked if he has been trying to get any exercise, but he said that he has not, encouraged to try.   No needs or concerns at this time, will out in monitoring status, blood pressure seems to be on the right track to being controlled.   SDOH updated and reviewed with the patient during this program:  Financial Resource Strain: Medium Risk   • Difficulty of Paying Living Expenses: Somewhat hard      Physical Activity: Inactive   • Days of Exercise per Week: 0 days   • Minutes of Exercise per Session: 0 min      Food Insecurity: No Food Insecurity   • Worried About Running Out of Food in the Last Year: Never true   • Ran Out of Food in the Last Year: Never true      Social Connections: Moderately Isolated   • Frequency of Communication with Friends and Family: Once a week   • Frequency of Social Gatherings with Friends and Family: Once a week   • Attends [employer] Services: More than 4 times per year   • Active Member of Clubs or Organizations: No   • Attends Club or Organization Meetings: Never   • Marital Status:       Transportation Needs: No Transportation Needs   • Lack of Transportation (Medical): No   • Lack of  Transportation (Non-Medical): No      Housing Stability: High Risk   • Unable to Pay for Housing in the Last Year: Yes   • Number of Places Lived in the Last Year: 1   • Unstable Housing in the Last Year: No      Stress: Stress Concern Present   • Feeling of Stress : To some extent       Education Documentation  No documentation found.      CHRISTINA ARMSTRONG  Ambulatory Case Management  7/21/2022, 10:25 EDT

## 2022-07-28 ENCOUNTER — PATIENT OUTREACH (OUTPATIENT)
Dept: CASE MANAGEMENT | Facility: OTHER | Age: 72
End: 2022-07-28

## 2022-07-28 DIAGNOSIS — I10 ESSENTIAL HYPERTENSION: Primary | ICD-10-CM

## 2022-07-28 DIAGNOSIS — J44.9 CHRONIC OBSTRUCTIVE PULMONARY DISEASE, UNSPECIFIED COPD TYPE: ICD-10-CM

## 2022-07-28 NOTE — OUTREACH NOTE
[facility] End of Month Documentation    This Chronic Medical Management Care Plan for Jose Riso, 71 y.o. male, has been monitored and managed; reviewed and a new plan of care implemented for the month of July.  A cumulative time of 20  minutes was spent on this patient record this month, including phone call with patient; chart review.    Regarding the patient's problems: has Anemia; Anxiety disorder; Chronic obstructive pulmonary disease (HCC); CKD (chronic kidney disease) stage 3, GFR 30-59 ml/min (HCC); Essential hypertension; Hyperlipidemia; Hyponatremia; Major depressive disorder; MVP (mitral valve prolapse); JIM (obstructive sleep apnea); Osteoarthritis; Depression; Centrilobular emphysema (HCC); DDD (degenerative disc disease), cervical; Spondylosis without myelopathy; Tremor; Vitamin D deficiency; Tobacco abuse, in remission; and Chronic dyspnea on their problem list., the following items were addressed: medical records; medications and any changes can be found within the plan section of the note.  A detailed listing of time spent for chronic care management is tracked within each outreach encounter.  Current medications include:  has a current medication list which includes the following prescription(s): tylenol, amlodipine, atorvastatin, bupropion xl, chlorpheniramine, clonazepam, diclofenac sodium, losartan, metoprolol succinate xl, naloxone, nystatin, oxycodone-acetaminophen, pantoprazole, primidone, and turmeric. and the patient is reported to be patient is compliant with medication protocol,  Medications are reported to be effective.  Regarding these diagnoses no new referrals were made.  All notes on chart for PCP to review.    The patient was monitored remotely for blood pressure; medications; activity level; mood & behavior; pain.    The patient's physical needs include:  medication education; physical healthcare.     The patient's mental support needs include:  needs met    The patient's cognitive  support needs include:  medication; health care    The patient's psychosocial support needs include:  needs met    The patient's functional needs include: physical healthcare    The patient's environmental needs include:  not applicable, N/A    Care Plan overall comments:  N/A    Refer to previous outreach notes for more information on the areas listed above.    Monthly Billing Diagnoses  (I10) Essential hypertension    (J44.9) Chronic obstructive pulmonary disease, unspecified COPD type (HCC)    Medications   · Medications have been reconciled    Care Plan progress this month:      Recently Modified Care Plans Updates made since 6/27/2022 12:00 AM    No recently modified care plans.        Instructions  · Patient was provided an electronic copy of care plan  · CCM services were explained and offered and patient has accepted these services.  · Patient has given their written consent to receive CCM services and understands that this includes the authorization of electronic communication of medical information with the other treating providers.  · Patient understands that they may stop CCM services at any time and these changes will be effective at the end of the calendar month and will effectively revocate the agreement of CCM services.  · Patient understands that only one practitioner can furnish and be paid for CCM services during one calendar month.  Patient also understands that there may be co-payment or deductible fees in association with CCM services.  · Patient will continue with at least monthly follow-up calls with the Nurse Navigator.    CHRISTINA ARMSTRONG  Ambulatory Case Management    7/28/2022, 11:51 EDT

## 2022-08-08 DIAGNOSIS — F41.9 ANXIETY: ICD-10-CM

## 2022-08-08 RX ORDER — CLONAZEPAM 1 MG/1
TABLET ORAL
Qty: 60 TABLET | Refills: 0 | Status: SHIPPED | OUTPATIENT
Start: 2022-08-08 | End: 2022-09-01

## 2022-08-31 ENCOUNTER — OFFICE VISIT (OUTPATIENT)
Dept: PULMONOLOGY | Facility: CLINIC | Age: 72
End: 2022-08-31

## 2022-08-31 VITALS
SYSTOLIC BLOOD PRESSURE: 177 MMHG | HEIGHT: 72 IN | WEIGHT: 205 LBS | BODY MASS INDEX: 27.77 KG/M2 | OXYGEN SATURATION: 98 % | HEART RATE: 50 BPM | RESPIRATION RATE: 16 BRPM | DIASTOLIC BLOOD PRESSURE: 79 MMHG

## 2022-08-31 DIAGNOSIS — G47.33 OSA (OBSTRUCTIVE SLEEP APNEA): ICD-10-CM

## 2022-08-31 DIAGNOSIS — F17.201 TOBACCO ABUSE, IN REMISSION: Primary | ICD-10-CM

## 2022-08-31 DIAGNOSIS — J44.9 CHRONIC OBSTRUCTIVE PULMONARY DISEASE, UNSPECIFIED COPD TYPE: ICD-10-CM

## 2022-08-31 PROCEDURE — 99214 OFFICE O/P EST MOD 30 MIN: CPT | Performed by: INTERNAL MEDICINE

## 2022-08-31 RX ORDER — BUDESONIDE, GLYCOPYRROLATE, AND FORMOTEROL FUMARATE 160; 9; 4.8 UG/1; UG/1; UG/1
2 AEROSOL, METERED RESPIRATORY (INHALATION) DAILY
COMMUNITY
Start: 2022-07-29

## 2022-08-31 NOTE — PROGRESS NOTES
Primary Care Provider  Jagdeep Bonner APRN     Referring Provider  No ref. provider found     Chief Complaint  COPD, Emphysema, Sleep Apnea, Follow-up (Lab results /Patient states he did not have labs done ), and Shortness of Breath    Subjective          History of Presenting Illness  71-year-old  male with COPD here for follow-up.  Patient has been doing well on his current inhaler regimen.  He is on triple inhaler regimen.  He gets short of breath walking about 1000 feet.  His dyspnea is moderate in severity, worse with activity relieved with rest.  He does wear his CPAP machine nightly since he has a new 1 and is having no issues.  Denies excessive daytime sleeping or morning headaches.  Denies any coughing or wheezing.  Patient states that he has not had take any antibiotics or steroids since last office visit and denies any hospitalizations since last office visit. Patient denies fever, chills, night sweats, swollen glands in the head and neck, unintentional weight loss, hemoptysis, purulent sputum production, dysphagia, chest pain, palpitations, chest tightness, abdominal pain, nausea, vomiting, and diarrhea.  Patient also denies any myalgias, changes in sense of taste and/or smell, sore throat, any other coronavirus or flu-like symptoms.  Patient denies any leg swelling, orthopnea, paroxysmal nocturnal dyspnea.  Patient is able to perform activities of daily living.    Review of Systems   Constitutional: Negative for activity change, appetite change, chills, diaphoresis, fatigue, fever, unexpected weight gain and unexpected weight loss.        Negative for Insomnia   HENT: Negative for congestion (Nasal), mouth sores, nosebleeds, postnasal drip, sore throat, swollen glands and trouble swallowing.         Negative for Thrush  Negative for Hoarseness  Negative for Allergies/Hay Fever  Negative for Recent Head injury  Negative for Ear Fullness  Negative for Nasal or Sinus pain  Negative for Dry  lips  Negative for Nasal discharge   Respiratory: Positive for shortness of breath. Negative for apnea, cough, chest tightness and wheezing.         Negative for Hemoptysis  Negative for Pleuritic pain   Cardiovascular: Negative for chest pain, palpitations and leg swelling.        Negative for Claudication  Negative for Cyanosis  Negative for Dyspnea on exertion   Gastrointestinal: Negative for abdominal pain, diarrhea, nausea, vomiting and GERD.   Musculoskeletal: Negative for joint swelling and myalgias.        Negative for Joint pain  Negative for Joint stiffness   Skin: Negative for color change, dry skin, pallor and rash.   Neurological: Negative for syncope, weakness and headache.   Hematological: Negative for adenopathy. Does not bruise/bleed easily.        Family History   Problem Relation Age of Onset   • Heart disease Mother    • Stroke Mother    • Arthritis Mother    • Heart disease Father    • Heart disease Maternal Grandfather    • Cancer Brother    • Malig Hyperthermia Neg Hx         Social History     Socioeconomic History   • Marital status:    Tobacco Use   • Smoking status: Former Smoker     Packs/day: 1.00     Years: 50.00     Pack years: 50.00     Types: Cigarettes     Quit date: 2016     Years since quittin.0   • Smokeless tobacco: Never Used   Vaping Use   • Vaping Use: Never used   Substance and Sexual Activity   • Alcohol use: Yes     Comment: 1 or 2 a amonth    • Drug use: Never   • Sexual activity: Defer     Partners: Female        Past Medical History:   Diagnosis Date   • Acid reflux disease    • Anemia 10/24/2016   • Anxiety    • Anxiety disorder 2017    HE STATES THAT HIS ANXIETY HAS BEEN WORSE. HE HAS GONE TO [facility]UNICARE IN THE PAST AND TRIED SSRI'S W/O MUCH BENEFIT. HE STATES THAT KLONOPIN HELPED, BUT STOPPED GOING AND THEN STOPPED KLONOPIN. RESTART KLONOPIN.    • Arthritis    • Arthritis     GENERALIZED  R KNEE   • Bipolar affect, depressed (HCC)    • Bipolar  disorder ([facility])    • Cervical radiculopathy 12/15/2015   • Cervical spinal stenosis 12/15/2015   • Cervicalgia 04/03/2018   • Chronic bronchitis ([facility])    • Chronic pain of both knees 02/02/2018   • CKD (chronic kidney disease) stage 3, GFR 30-59 ml/min ([facility]) 02/02/2018   • COPD (chronic obstructive pulmonary disease) ([facility])    • COPD (chronic obstructive pulmonary disease) ([facility])     USES INHALERS   • Depression    • Esophageal reflux    • Essential hypertension 07/29/2019   • Folic acid deficiency 11/02/2017   • Foraminal stenosis of cervical region 05/10/2018   • Gastric reflux    • Head injury    • Hemorrhoids     RECTAL PROB (ACID REFLUX)   • Herniated disc, cervical 10/21/2014    C5-6, RIGHT   • Hyperlipidemia    • Hypertension    • Hypertension     CONTROLLED WITH MEDS   • Hyponatremia 10/03/2016, 11/9/2015   • Limb swelling    • Lung disease    • Major depressive disorder 07/29/2019   • Mediastinal adenopathy 10/03/2016   • Migraine    • MVP (mitral valve prolapse) 10/07/2015   • JIM (obstructive sleep apnea) 10/03/2016   • Osteoarthritis 07/29/2019   • Pneumonia    • Shortness of breath    • Slurred speech 12/01/2016   • SOB (shortness of breath)    • Tremor of right hand 10/07/2015   • Ulnar neuritis 10/18/2018    CLINICAL        Immunization History   Administered Date(s) Administered   • COVID-19 (AVTAR) 05/27/2021, 11/10/2021   • COVID-19 (MODERNA) 1st, 2nd, 3rd Dose Only 09/01/2021   • COVID-19 (MODERNA) BOOSTER 08/23/2022   • Fluzone High-Dose 65+yrs 09/20/2021   • Fluzone Quad >6mos (Multi-dose) 10/16/2017, 10/01/2018   • Influenza TIV (IM) 10/01/2019   • Influenza, Unspecified 10/01/2020, 02/28/2021   • Pneumococcal Conjugate 13-Valent (PCV13) 07/29/2019, 07/29/2019   • Pneumococcal Polysaccharide (PPSV23) 08/01/2014, 08/01/2014   • Shingrix 04/21/2022, 05/26/2022   • Tdap 06/07/2017       Allergies   Allergen Reactions   • Penicillins Swelling          Current Outpatient Medications:   •   Acetaminophen (Tylenol) 325 MG capsule, Tylenol, Disp: , Rfl:   •  amLODIPine (NORVASC) 5 MG tablet, Take 1 tablet by mouth Daily., Disp: 90 tablet, Rfl: 1  •  atorvastatin (LIPITOR) 10 MG tablet, Take 1 tablet by mouth Daily., Disp: 90 tablet, Rfl: 1  •  Breztri Aerosphere 160-9-4.8 MCG/ACT aerosol inhaler, Inhale 2 puffs Daily., Disp: , Rfl:   •  buPROPion XL (Wellbutrin XL) 300 MG 24 hr tablet, Take 1 tablet by mouth Daily., Disp: 90 tablet, Rfl: 1  •  chlorpheniramine (CHLOR-TRIMETON) 4 MG tablet, Take 4 mg by mouth Every 6 (Six) Hours As Needed for Allergies., Disp: , Rfl:   •  clonazePAM (KlonoPIN) 1 MG tablet, TAKE ONE TABLET BY MOUTH TWO TIMES A DAY, Disp: 60 tablet, Rfl: 0  •  Diclofenac Sodium (VOLTAREN) 1 % gel gel, Apply  topically to the appropriate area as directed 2 (Two) Times a Day., Disp: 150 g, Rfl: 2  •  losartan (COZAAR) 50 MG tablet, Take 1 tablet by mouth 2 (Two) Times a Day., Disp: 180 tablet, Rfl: 1  •  metoprolol succinate XL (TOPROL-XL) 200 MG 24 hr tablet, Take 1 tablet by mouth Daily., Disp: 90 tablet, Rfl: 1  •  naloxone (NARCAN) 4 MG/0.1ML nasal spray, naloxone 4 mg/actuation nasal spray  ONE SPRAY IN ONE NOSTRIL AS NEEDED FOR OVER SEDATION OR RESPIRATORY DEPRESSION FROM OPIOID USE. REPEAT ONE SPRAY IN ALTERNATE NOSTRIL EV<MORE>, Disp: , Rfl:   •  nystatin (MYCOSTATIN) 393493 UNIT/GM powder, APPLY TO THE AFFECTED AREA(S) BY TOPICAL ROUTE 3 TIMES PER DAY, Disp: 60 g, Rfl: 2  •  oxyCODONE-acetaminophen (PERCOCET)  MG per tablet, TAKE 1 TABLET EVERY 8 HOURS BY ORAL ROUTE AS NEEDED FOR 30 DAYS., Disp: , Rfl:   •  pantoprazole (PROTONIX) 40 MG EC tablet, Take 1 tablet by mouth Daily., Disp: 90 tablet, Rfl: 1  •  primidone (MYSOLINE) 50 MG tablet, Take 1 tablet by mouth 3 (Three) Times a Day., Disp: 270 tablet, Rfl: 1  •  Turmeric 500 MG capsule, Take 1,000 mg by mouth Daily., Disp: , Rfl:      Objective     Physical Exam  Vital Signs:   WDWN, Alert, NAD.    HEENT:  PERRL, EOMI.  OP,  "nares clear, no sinus tenderness  Neck:  Supple, no JVD, no thyromegaly.  Chest:   Good aeration. No wheezes, rales, or rhonchi appreciated.  Normal work of breathing noted.  Patient is able speak full sentences without difficulty.  CV: RRR, no MGR, pulses 2+, equal.  Abd:  Soft, NT, ND, + BS, no HSM  EXT:  no clubbing, no cyanosis, no edema, no joint tenderness  Neuro:  A&Ox3, CN grossly intact, no focal deficits.  Skin: No rashes or lesions noted.    /79 (BP Location: Left arm, Patient Position: Sitting, Cuff Size: Adult)   Pulse 50   Resp 16   Ht 182.9 cm (72\")   Wt 93 kg (205 lb)   SpO2 98% Comment: Room air. Uses CPAP at night  BMI 27.80 kg/m²         Result Review :   I reviewed Kristin Aminta's last office note.  2022 LDCT screening for lung cancer ending showed emphysematous changes with no suspicious nodules or masses.         Assessment and Plan      Assessment:  1. Chronic dyspnea at baseline.       2. Emphysema.  Patient on triple inhaler therapy.  Doing well with COPD assessment test score 7 signifying adequate disease control  3. Obstructive sleep apnea.  Well-controlled with CPAP  4.  Stable aortic aneurysm.  5. Tobacco abuse of cigarettes in remission.  Patient already enrolled in lung cancer screening.     Plan:  Continue Breztri plus albuterol as needed  2022 LDCT screen personally showing no suspicious nodules or masses.  Due again in August 2023  Continue CPAP nightly and with naps on current settings  Vaccination status: patient reports they are up-to-date with flu, pneumonia, and Covid vaccines.   Smoking status: patient is a former cigarette smoker. Patient already enrolled in lung cancer screening.       Follow Up   Return in about 6 months (around 2/28/2023).  Patient was given instructions and counseling regarding his condition or for health maintenance advice. Please see specific information pulled into the AVS if appropriate.     Electronically signed by Percy Kruger MD, " 08/31/22, 3:58 PM EDT.

## 2022-09-01 DIAGNOSIS — E78.2 MIXED HYPERLIPIDEMIA: ICD-10-CM

## 2022-09-01 DIAGNOSIS — F41.9 ANXIETY: ICD-10-CM

## 2022-09-01 RX ORDER — CLONAZEPAM 1 MG/1
TABLET ORAL
Qty: 60 TABLET | Refills: 0 | Status: SHIPPED | OUTPATIENT
Start: 2022-09-01 | End: 2022-09-29

## 2022-09-01 RX ORDER — ATORVASTATIN CALCIUM 10 MG/1
10 TABLET, FILM COATED ORAL DAILY
Qty: 90 TABLET | Refills: 1 | Status: SHIPPED | OUTPATIENT
Start: 2022-09-01 | End: 2022-12-14 | Stop reason: SDUPTHER

## 2022-09-07 ENCOUNTER — PATIENT OUTREACH (OUTPATIENT)
Dept: CASE MANAGEMENT | Facility: OTHER | Age: 72
End: 2022-09-07

## 2022-09-07 DIAGNOSIS — I10 HYPERTENSION, UNSPECIFIED TYPE: Primary | ICD-10-CM

## 2022-09-07 PROCEDURE — 99490 CHRNC CARE MGMT STAFF 1ST 20: CPT | Performed by: NURSE PRACTITIONER

## 2022-09-07 NOTE — OUTREACH NOTE
AMBULATORY CASE MANAGEMENT NOTE    Name and Relationship of Patient/Support Person: Gabriel Jose E - Self    CCM Interim Update    Reviewed chart prior to calling patient, he said he hasn't been well, they have COVID. Denies any SOA, and states he isn't as bad as his wife and son.  He seen Pulmonology 8/31, and nothing was changed. Reminded him about lab work that was ordered in July that he needs to get completed. His BP at that OV was 177/79.   Asked if his depression medication has still been helping and he said it has.   Asked if he has been checking his blood pressure and states he takes it a few times a week and its usually around 140's/80's. Encouraged to start keeping a log to show PCP at next OV.   No needs or concerns at this time.  Education Documentation  No documentation found.      CHRISTINA ARMSTRONG  Ambulatory Case Management  9/7/2022, 11:30 EDT

## 2022-09-29 ENCOUNTER — PATIENT OUTREACH (OUTPATIENT)
Dept: CASE MANAGEMENT | Facility: OTHER | Age: 72
End: 2022-09-29

## 2022-09-29 DIAGNOSIS — J44.9 CHRONIC OBSTRUCTIVE PULMONARY DISEASE, UNSPECIFIED COPD TYPE: ICD-10-CM

## 2022-09-29 DIAGNOSIS — F41.9 ANXIETY: ICD-10-CM

## 2022-09-29 DIAGNOSIS — I10 ESSENTIAL HYPERTENSION: Primary | ICD-10-CM

## 2022-09-29 RX ORDER — CLONAZEPAM 1 MG/1
TABLET ORAL
Qty: 60 TABLET | Refills: 0 | Status: SHIPPED | OUTPATIENT
Start: 2022-09-29 | End: 2022-10-31

## 2022-09-29 NOTE — OUTREACH NOTE
[facility] End of Month Documentation    This Chronic Medical Management Care Plan for Jose Rios, 71 y.o. male, has been monitored and managed; reviewed and a new plan of care implemented for the month of September.  A cumulative time of 20  minutes was spent on this patient record this month, including phone call with patient; chart review.    Regarding the patient's problems: has Anemia; Anxiety disorder; Chronic obstructive pulmonary disease (HCC); CKD (chronic kidney disease) stage 3, GFR 30-59 ml/min (HCC); Essential hypertension; Hyperlipidemia; Hyponatremia; Major depressive disorder; MVP (mitral valve prolapse); JIM (obstructive sleep apnea); Osteoarthritis; Depression; Centrilobular emphysema (HCC); DDD (degenerative disc disease), cervical; Spondylosis without myelopathy; Tremor; Vitamin D deficiency; Tobacco abuse, in remission; and Chronic dyspnea on their problem list., the following items were addressed: medical records; medications and any changes can be found within the plan section of the note.  A detailed listing of time spent for chronic care management is tracked within each outreach encounter.  Current medications include:  has a current medication list which includes the following prescription(s): tylenol, amlodipine, atorvastatin, breztri aerosphere, bupropion xl, chlorpheniramine, clonazepam, diclofenac sodium, losartan, metoprolol succinate xl, naloxone, nystatin, oxycodone-acetaminophen, pantoprazole, primidone, sulfamethoxazole-trimethoprim, and turmeric. and the patient is reported to be patient is compliant with medication protocol,  Medications are reported to be effective.  Regarding these diagnoses no new referrals were made.  All notes on chart for PCP to review.    The patient was monitored remotely for blood pressure; medications; mood & behavior; pain, labs.    The patient's physical needs include:  medication education; physical healthcare.     The patient's mental support needs  include:  needs met    The patient's cognitive support needs include:  medication; health care    The patient's psychosocial support needs include:  needs met    The patient's functional needs include: physical healthcare    The patient's environmental needs include:  not applicable    Care Plan overall comments:  N/A    Refer to previous outreach notes for more information on the areas listed above.    Monthly Billing Diagnoses  (I10) Essential hypertension    (J44.9) Chronic obstructive pulmonary disease, unspecified COPD type (HCC)    Medications   · Medications have been reconciled    Care Plan progress this month:      Recently Modified Care Plans Updates made since 8/29/2022 12:00 AM    No recently modified care plans.        Instructions   · Patient was provided an electronic copy of care plan  · CCM services were explained and offered and patient has accepted these services.  · Patient has given their written consent to receive CCM services and understands that this includes the authorization of electronic communication of medical information with the other treating providers.  · Patient understands that they may stop CCM services at any time and these changes will be effective at the end of the calendar month and will effectively revocate the agreement of CCM services.  · Patient understands that only one practitioner can furnish and be paid for CCM services during one calendar month.  Patient also understands that there may be co-payment or deductible fees in association with CCM services.  · Patient will continue with at least monthly follow-up calls with the Nurse Navigator.    CHRISTINA ARMSTRONG  Ambulatory Case Management    9/29/2022, 13:58 EDT

## 2022-10-31 DIAGNOSIS — F41.9 ANXIETY: ICD-10-CM

## 2022-10-31 RX ORDER — CLONAZEPAM 1 MG/1
TABLET ORAL
Qty: 60 TABLET | Refills: 0 | Status: SHIPPED | OUTPATIENT
Start: 2022-10-31 | End: 2022-11-28

## 2022-11-10 DIAGNOSIS — I10 ESSENTIAL HYPERTENSION: ICD-10-CM

## 2022-11-11 RX ORDER — LOSARTAN POTASSIUM 50 MG/1
TABLET ORAL
Qty: 180 TABLET | Refills: 1 | Status: SHIPPED | OUTPATIENT
Start: 2022-11-11

## 2022-11-11 RX ORDER — AMLODIPINE BESYLATE 5 MG/1
5 TABLET ORAL DAILY
Qty: 90 TABLET | Refills: 1 | Status: SHIPPED | OUTPATIENT
Start: 2022-11-11

## 2022-11-11 RX ORDER — PANTOPRAZOLE SODIUM 40 MG/1
40 TABLET, DELAYED RELEASE ORAL DAILY
Qty: 90 TABLET | Refills: 1 | Status: SHIPPED | OUTPATIENT
Start: 2022-11-11

## 2022-11-17 ENCOUNTER — PATIENT OUTREACH (OUTPATIENT)
Dept: CASE MANAGEMENT | Facility: OTHER | Age: 72
End: 2022-11-17

## 2022-11-17 DIAGNOSIS — I10 ESSENTIAL HYPERTENSION: Primary | ICD-10-CM

## 2022-11-17 PROCEDURE — 99490 CHRNC CARE MGMT STAFF 1ST 20: CPT | Performed by: NURSE PRACTITIONER

## 2022-11-17 NOTE — OUTREACH NOTE
AMBULATORY CASE MANAGEMENT NOTE    Name and Relationship of Patient/Support Person: Jose Rios E - Self    CCM Interim Update    Reviewed chart prior to calling for outreach, patient states he is doing alright. He got his Flu Vaccine 10/3 at [facility], called and requested that record. Patient states they are in the process of moving, they have more land than they can take care of, so they are selling it.  Patient had COVID in September, no residual side effects noted.  Patient states his mood has been pretty good, he has good days and bad days.  Patient c/o of pain in his right ankle. He c/o of this at last OV with PCP in June and an x-ray was ordered, however he never did get it done. Advised to get x-ray and we can go from there. He hasn't had any new injury, he injured it years ago.  Patient states his blood pressure has been a little high lately, last one was 160's/80's. He does not check it daily nor does he keep a log. Encouraged to check it daily, and bring log with him to December, his next appointment with PCP. Verified he is taking all three blood pressure medications. Educated about salt intake and healthy diet. Patient unable to exercise due to chronic pain, he attends Pain Management.    Education Documentation  No documentation found.      CHRISTINA ARMSTRONG  Ambulatory Case Management  11/17/2022, 11:40 EST

## 2022-11-28 ENCOUNTER — PROCEDURE VISIT (OUTPATIENT)
Dept: CARDIAC REHAB | Facility: HOSPITAL | Age: 72
End: 2022-11-28

## 2022-11-28 ENCOUNTER — HOSPITAL ENCOUNTER (OUTPATIENT)
Dept: RESPIRATORY THERAPY | Facility: HOSPITAL | Age: 72
Discharge: HOME | End: 2022-11-28

## 2022-11-28 DIAGNOSIS — R06.02 SHORTNESS OF BREATH: ICD-10-CM

## 2022-11-28 DIAGNOSIS — J44.9 CHRONIC OBSTRUCTIVE PULMONARY DISEASE, UNSPECIFIED COPD TYPE: ICD-10-CM

## 2022-11-28 DIAGNOSIS — F41.9 ANXIETY: ICD-10-CM

## 2022-11-28 DIAGNOSIS — J84.10 PULMONARY FIBROSIS: ICD-10-CM

## 2022-11-28 PROCEDURE — 94060 EVALUATION OF WHEEZING: CPT

## 2022-11-28 PROCEDURE — 94726 PLETHYSMOGRAPHY LUNG VOLUMES: CPT | Performed by: INTERNAL MEDICINE

## 2022-11-28 PROCEDURE — 94729 DIFFUSING CAPACITY: CPT | Performed by: INTERNAL MEDICINE

## 2022-11-28 PROCEDURE — 94729 DIFFUSING CAPACITY: CPT

## 2022-11-28 PROCEDURE — 94726 PLETHYSMOGRAPHY LUNG VOLUMES: CPT

## 2022-11-28 PROCEDURE — 94618 PULMONARY STRESS TESTING: CPT

## 2022-11-28 PROCEDURE — 94060 EVALUATION OF WHEEZING: CPT | Performed by: INTERNAL MEDICINE

## 2022-11-28 RX ORDER — ALBUTEROL SULFATE 2.5 MG/3ML
2.5 SOLUTION RESPIRATORY (INHALATION) ONCE
Status: COMPLETED | OUTPATIENT
Start: 2022-11-28 | End: 2022-11-28

## 2022-11-28 RX ORDER — CLONAZEPAM 1 MG/1
TABLET ORAL
Qty: 60 TABLET | Refills: 0 | Status: SHIPPED | OUTPATIENT
Start: 2022-11-28 | End: 2022-12-19

## 2022-11-28 RX ADMIN — ALBUTEROL SULFATE 2.5 MG: 2.5 SOLUTION RESPIRATORY (INHALATION) at 08:45

## 2022-11-30 ENCOUNTER — PATIENT OUTREACH (OUTPATIENT)
Dept: CASE MANAGEMENT | Facility: OTHER | Age: 72
End: 2022-11-30

## 2022-11-30 DIAGNOSIS — I10 ESSENTIAL HYPERTENSION: ICD-10-CM

## 2022-11-30 DIAGNOSIS — J44.9 CHRONIC OBSTRUCTIVE PULMONARY DISEASE, UNSPECIFIED COPD TYPE: Primary | ICD-10-CM

## 2022-11-30 NOTE — OUTREACH NOTE
[facility] End of Month Documentation    This Chronic Medical Management Care Plan for Jose Rios, 71 y.o. male, has been monitored and managed; reviewed and a new plan of care implemented for the month of November.  A cumulative time of 20  minutes was spent on this patient record this month, including phone call with patient; chart review; phone call with pharmacist.    Regarding the patient's problems: has Anemia; Anxiety disorder; Chronic obstructive pulmonary disease (HCC); CKD (chronic kidney disease) stage 3, GFR 30-59 ml/min (HCC); Essential hypertension; Hyperlipidemia; Hyponatremia; Major depressive disorder; MVP (mitral valve prolapse); JIM (obstructive sleep apnea); Osteoarthritis; Depression; Centrilobular emphysema (HCC); DDD (degenerative disc disease), cervical; Spondylosis without myelopathy; Tremor; Vitamin D deficiency; Tobacco abuse, in remission; and Chronic dyspnea on their problem list., the following items were addressed: medical records; medications and any changes can be found within the plan section of the note.  A detailed listing of time spent for chronic care management is tracked within each outreach encounter.  Current medications include:  has a current medication list which includes the following prescription(s): tylenol, amlodipine, atorvastatin, breztri aerosphere, bupropion xl, chlorpheniramine, clonazepam, diclofenac sodium, losartan, metoprolol succinate xl, naloxone, nystatin, oxycodone-acetaminophen, pantoprazole, primidone, sulfamethoxazole-trimethoprim, and turmeric. and the patient is reported to be patient is compliant with medication protocol,  Medications are reported to be non-effective in controlling symptoms and changes have been made to the medication protocol.  Regarding these diagnoses no new referrals were made.  All notes on chart for PCP to review.    The patient was monitored remotely for blood pressure; medications; mood & behavior; pain; activity level.    The  "patient's physical needs include:  physical healthcare.     The patient's mental support needs include:  needs met    The patient's cognitive support needs include:  health care    The patient's psychosocial support needs include:  needs met    The patient's functional needs include: physical healthcare    The patient's environmental needs include:  not applicable    Care Plan overall comments:  N/A    Refer to previous outreach notes for more information on the areas listed above.    Monthly Billing Diagnoses  (J44.9) Chronic obstructive pulmonary disease, unspecified COPD type (HCC)    (I10) Essential hypertension    Medications   · Medications have been reconciled    Care Plan progress this month:      Recently Modified Care Plans Updates made since 10/30/2022 12:00 AM     Hypertension (Adult)         Problem Priority Last Modified     ELS HYPERTENSION (HYPERTENSION) (ADULT) --  3/30/2022  4:23 PM by Sushila Zayas, RN              Goal Recent Progress Last Modified     Hypertension Monitored --  3/30/2022  4:23 PM by Sushila Zayas, RN     Evidence-based guidance:   Promote initial use of ambulatory blood pressure measurements (for 3 days) to rule out \"white-coat\" effect; identify masked hypertension and presence or absence of nocturnal \"dipping\" of blood pressure.    Encourage continued use of home blood pressure monitoring and recording in blood pressure log; include symptoms of hypotension or potential medication side effects in log.   Review blood pressure measurements taken inside and outside of the provider office; establish baseline and monitor trends; compare to target ranges or patient goal.   Share overall cardiovascular risk with patient; encourage changes to lifestyle risk factors, including alcohol consumption, smoking, inadequate exercise, poor dietary habits and stress.    Notes:            Task Due Date Last Modified     Identify and Monitor Blood Pressure Elevation --  11/17/2022 " 11:56 AM by Sushila Zayas, PRACHI     Care Management Activities:      - blood pressure trends reviewed  - home or ambulatory blood pressure monitoring encouraged      Notes:              Problem Priority Last Modified     ELS DISEASE PROGRESSION (HYPERTENSION) (ADULT) --  3/30/2022  4:23 PM by Sushila Zayas RN              Goal Recent Progress Last Modified     Disease Progression Prevented or Minimized --  3/30/2022  4:23 PM by Sushila Zayas, RN     Evidence-based guidance:   Tailor lifestyle advice to individual; review progress regularly; give frequent encouragement and respond positively to incremental successes.   Assess for and promote awareness of worsening disease or development of comorbidity.   Prepare patient for laboratory and diagnostic exams based on risk and presentation.   Prepare patient for use of pharmacologic therapy that may include diuretic, beta-blocker, beta-blocker/thiazide combination, angiotensin-converting enzyme inhibitor, renin-angiotensin blocker or calcium-channel blocker.   Expect periodic adjustments to pharmacologic therapy; manage side effects.   Promote a healthy diet that includes primarily plant-based foods, such as fruits, vegetables, whole grains, beans and legumes, low-fat dairy and lean meats.    Consider moderate reduction in sodium intake by avoiding the addition of salt to prepared foods and limiting processed meats, canned soup, frozen meals and salty snacks.    Promote a regular, daily exercise goal of 150 minutes per week of moderate exercise based on tolerance, ability and patient choice; consider referral to physical therapist, community wellness and/or activity program.   Encourage the avoidance of no more than 2 hours per day of sedentary activity, such as recreational screen time.   Review sources of stress; explore current coping strategies and encourage use of mindfulness, yoga, meditation or exercise to manage stress.    Notes:             Task Due Date Last Modified     Alleviate Barriers to Hypertension Treatment --  11/17/2022 11:57 AM by Sushila Zayas, RN     Care Management Activities:      - difficulty of making life-long changes acknowledged  - healthy diet promoted  - pain assessed and managed  - reduction of dietary sodium encouraged  - reduction in sedentary activities encouraged      Notes:              Problem Priority Last Modified     ELS RESISTANT HYPERTENSION (HYPERTENSION) (ADULT) --  3/30/2022  4:23 PM by Sushila Zayas, RN              Goal Recent Progress Last Modified     Response to Treatment Maximized --  3/30/2022  4:23 PM by Sushila Zayas, RN     Evidence-based guidance:   Assess patient response to treatment, including presence or absence of medication side effects, degree of blood pressure control and patient satisfaction.   Assess technique (including cuff size and placement), measurement times, condition and calibration of blood pressure cuff set (both at-home and in-office equipment).   Assess factors that may influence response to treatment, including nonadherence to pharmacologic treatment plan, diet or activity changes and/or presence of pain, stress or sleep disturbance.   Screen for signs and symptoms of depression; if present, refer for or complete a comprehensive assessment.   Evaluate social and economic barriers that may affect adherence to treatment plan   Address pharmacologic nonadherence by simplifying dosing regimen, counseling or support by pharmacist, financial assistance, self-monitoring of blood pressure, use of motivational interviewing, voice or text messages.   Encourage behavioral adherence strategies, like habit-based interventions that link medication taking with existing daily routines.   Assess barriers to regular, daily physical activity; support family or support person-oriented activity changes and utilization of community activity or sports program.   Address barriers to  dietary changes, especially sodium restriction, with referrals to community programs, like cooking classes, meal services or intensive education when available.   Refer to community-based peer support program or nurse home-visiting program.   Assess for chronic pain; when present add additional goals (Chronic Pain Care Plan Guide) as needed.   Provide frequent follow-up by telephone, telemonitoring, patient-practice portal or with home visit.   Review alcohol use screen; address using brief intervention beginning with risk that interferes with blood pressure control; refer for treatment when excessive alcohol use is noted.   Screen for obstructive sleep apnea; prepare patient for polysomnography based on risk and presentation and use of noninvasive ventilation to relieve obstructive sleep apnea when present.    Notes:            Task Due Date Last Modified     Facilitate Adherence to Lifestyle Change --  11/17/2022 11:57 AM by Sushila Zayas RN     Care Management Activities:      - medication adherence assessment completed  - pain assessed and managed  - support and encouragement provided      Notes:                    Instructions   · Patient was provided an electronic copy of care plan  · CCM services were explained and offered and patient has accepted these services.  · Patient has given their written consent to receive CCM services and understands that this includes the authorization of electronic communication of medical information with the other treating providers.  · Patient understands that they may stop CCM services at any time and these changes will be effective at the end of the calendar month and will effectively revocate the agreement of CCM services.  · Patient understands that only one practitioner can furnish and be paid for CCM services during one calendar month.  Patient also understands that there may be co-payment or deductible fees in association with CCM services.  · Patient will  continue with at least monthly follow-up calls with the Ambulatory .    CHRISTINA ARMSTRONG  Ambulatory Case Management    11/30/2022, 10:39 EST

## 2022-12-14 ENCOUNTER — OFFICE VISIT (OUTPATIENT)
Dept: FAMILY MEDICINE CLINIC | Facility: CLINIC | Age: 72
End: 2022-12-14

## 2022-12-14 VITALS
RESPIRATION RATE: 16 BRPM | DIASTOLIC BLOOD PRESSURE: 62 MMHG | WEIGHT: 215.4 LBS | TEMPERATURE: 97.5 F | SYSTOLIC BLOOD PRESSURE: 124 MMHG | HEART RATE: 52 BPM | BODY MASS INDEX: 29.17 KG/M2 | OXYGEN SATURATION: 96 % | HEIGHT: 72 IN

## 2022-12-14 DIAGNOSIS — I10 ESSENTIAL HYPERTENSION: ICD-10-CM

## 2022-12-14 DIAGNOSIS — F41.9 ANXIETY DISORDER, UNSPECIFIED TYPE: ICD-10-CM

## 2022-12-14 DIAGNOSIS — R05.1 ACUTE COUGH: ICD-10-CM

## 2022-12-14 DIAGNOSIS — E55.9 VITAMIN D DEFICIENCY: ICD-10-CM

## 2022-12-14 DIAGNOSIS — F41.9 ANXIETY: ICD-10-CM

## 2022-12-14 DIAGNOSIS — E78.2 MIXED HYPERLIPIDEMIA: ICD-10-CM

## 2022-12-14 DIAGNOSIS — R25.1 TREMOR: ICD-10-CM

## 2022-12-14 DIAGNOSIS — F33.1 MODERATE EPISODE OF RECURRENT MAJOR DEPRESSIVE DISORDER: Primary | ICD-10-CM

## 2022-12-14 DIAGNOSIS — G47.00 INSOMNIA, UNSPECIFIED TYPE: ICD-10-CM

## 2022-12-14 PROBLEM — M47.816 LUMBAR SPONDYLOSIS: Status: ACTIVE | Noted: 2022-06-16

## 2022-12-14 PROCEDURE — 99214 OFFICE O/P EST MOD 30 MIN: CPT | Performed by: NURSE PRACTITIONER

## 2022-12-14 RX ORDER — PRIMIDONE 50 MG/1
50 TABLET ORAL 3 TIMES DAILY
Qty: 270 TABLET | Refills: 1 | Status: SHIPPED | OUTPATIENT
Start: 2022-12-14

## 2022-12-14 RX ORDER — METOPROLOL SUCCINATE 200 MG/1
200 TABLET, EXTENDED RELEASE ORAL DAILY
Qty: 90 TABLET | Refills: 1 | Status: SHIPPED | OUTPATIENT
Start: 2022-12-14

## 2022-12-14 RX ORDER — BUPROPION HYDROCHLORIDE 300 MG/1
300 TABLET ORAL DAILY
Qty: 90 TABLET | Refills: 1 | Status: SHIPPED | OUTPATIENT
Start: 2022-12-14

## 2022-12-14 RX ORDER — AZITHROMYCIN 250 MG/1
TABLET, FILM COATED ORAL
Qty: 6 TABLET | Refills: 0 | Status: SHIPPED | OUTPATIENT
Start: 2022-12-14

## 2022-12-14 RX ORDER — ATORVASTATIN CALCIUM 10 MG/1
10 TABLET, FILM COATED ORAL DAILY
Qty: 90 TABLET | Refills: 1 | Status: SHIPPED | OUTPATIENT
Start: 2022-12-14

## 2022-12-14 NOTE — PROGRESS NOTES
Chief Complaint  Hypertension, Anxiety, and Cough    Subjective        Jose Rios presents to [facility]  History of Present Illness  Hypertension:  On amlodipine 5mg and cozaar 100mg a day as well as 200mg metoprolol.  Not having dizzy spells currnetly.      Anxiety:  Not doing real we..  Sometimes has insomnia and takes 2 clonazepam to go to sleep.    Insomnia:  Tried ambien in the past and didn't do anything for him.  Benadryl doesn't make him sleepy.  Has been on seroquel for other diagnosis and didn't make him sleepy.  Has tried trazodone and elavil in the past without success.    Has had a cough the last few days has gotten a little better but still has .  Hasn't seen a color to sputum because not getting up all the way.  Hypertension  Associated symptoms include anxiety. Pertinent negatives include no chest pain, palpitations or shortness of breath.   Anxiety  Patient reports no chest pain, dizziness, palpitations or shortness of breath.       Cough  Pertinent negatives include no chest pain, fever or shortness of breath.         Past History:    Medical History: has a past medical history of Acid reflux disease, Anemia (10/24/2016), Anxiety, Anxiety disorder (09/21/2017), Arthritis, Arthritis, Bipolar affect, depressed ([facility]), Bipolar disorder ([facility]), Cervical radiculopathy (12/15/2015), Cervical spinal stenosis (12/15/2015), Cervicalgia (04/03/2018), Chronic bronchitis ([facility]), Chronic pain of both knees (02/02/2018), CKD (chronic kidney disease) stage 3, GFR 30-59 ml/min ([facility]) (02/02/2018), COPD (chronic obstructive pulmonary disease) ([facility]), COPD (chronic obstructive pulmonary disease) ([facility]), Depression, Esophageal reflux, Essential hypertension (07/29/2019), Folic acid deficiency (11/02/2017), Foraminal stenosis of cervical region (05/10/2018), Gastric reflux, Head injury, Hemorrhoids, Herniated disc, cervical (10/21/2014), Hyperlipidemia, Hypertension, Hypertension,  Hyponatremia (10/03/2016, 11/9/2015), Limb swelling, Lung disease, Major depressive disorder (07/29/2019), Mediastinal adenopathy (10/03/2016), Migraine, MVP (mitral valve prolapse) (10/07/2015), JIM (obstructive sleep apnea) (10/03/2016), Osteoarthritis (07/29/2019), Pneumonia, Shortness of breath, Slurred speech (12/01/2016), SOB (shortness of breath), Tremor of right hand (10/07/2015), and Ulnar neuritis (10/18/2018).     Surgical History: has a past surgical history that includes Appendectomy; Other surgical history (Left); Back surgery; Cervical fusion (11/05/2014, 3/11/2015); Cervical disc surgery (1996); Appendectomy; orthopedic surgery (Left); Spine surgery; Joint replacement; Nerve Surgery; and Colonoscopy (N/A, 1/4/2022).     Family History: family history includes Arthritis in his mother; Cancer in his brother; Heart disease in his father, maternal grandfather, and mother; Stroke in his mother.     Social History: reports that he quit smoking about 6 years ago. His smoking use included cigarettes. He has a 50.00 pack-year smoking history. He has never used smokeless tobacco. He reports current alcohol use. He reports that he does not use drugs.    Allergies: Penicillins          Current Outpatient Medications:   •  Acetaminophen (Tylenol) 325 MG capsule, Tylenol, Disp: , Rfl:   •  amLODIPine (NORVASC) 5 MG tablet, TAKE 1 TABLET BY MOUTH DAILY., Disp: 90 tablet, Rfl: 1  •  atorvastatin (LIPITOR) 10 MG tablet, Take 1 tablet by mouth Daily., Disp: 90 tablet, Rfl: 1  •  Breztri Aerosphere 160-9-4.8 MCG/ACT aerosol inhaler, Inhale 2 puffs Daily., Disp: , Rfl:   •  buPROPion XL (Wellbutrin XL) 300 MG 24 hr tablet, Take 1 tablet by mouth Daily., Disp: 90 tablet, Rfl: 1  •  chlorpheniramine (CHLOR-TRIMETON) 4 MG tablet, Take 4 mg by mouth Every 6 (Six) Hours As Needed for Allergies., Disp: , Rfl:   •  clonazePAM (KlonoPIN) 1 MG tablet, TAKE ONE TABLET BY MOUTH TWO TIMES A DAY, Disp: 60 tablet, Rfl: 0  •   Diclofenac Sodium (VOLTAREN) 1 % gel gel, Apply  topically to the appropriate area as directed 2 (Two) Times a Day., Disp: 150 g, Rfl: 2  •  losartan (COZAAR) 50 MG tablet, TAKE 1 TABLET BY MOUTH 2 TIMES A DAY., Disp: 180 tablet, Rfl: 1  •  metoprolol succinate XL (TOPROL-XL) 200 MG 24 hr tablet, Take 1 tablet by mouth Daily., Disp: 90 tablet, Rfl: 1  •  naloxone (NARCAN) 4 MG/0.1ML nasal spray, naloxone 4 mg/actuation nasal spray  ONE SPRAY IN ONE NOSTRIL AS NEEDED FOR OVER SEDATION OR RESPIRATORY DEPRESSION FROM OPIOID USE. REPEAT ONE SPRAY IN ALTERNATE NOSTRIL EV<MORE>, Disp: , Rfl:   •  nystatin (MYCOSTATIN) 305256 UNIT/GM powder, APPLY TO THE AFFECTED AREA(S) BY TOPICAL ROUTE 3 TIMES PER DAY, Disp: 60 g, Rfl: 2  •  oxyCODONE-acetaminophen (PERCOCET)  MG per tablet, TAKE 1 TABLET EVERY 8 HOURS BY ORAL ROUTE AS NEEDED FOR 30 DAYS., Disp: , Rfl:   •  pantoprazole (PROTONIX) 40 MG EC tablet, TAKE 1 TABLET BY MOUTH DAILY., Disp: 90 tablet, Rfl: 1  •  primidone (MYSOLINE) 50 MG tablet, Take 1 tablet by mouth 3 (Three) Times a Day., Disp: 270 tablet, Rfl: 1  •  Turmeric 500 MG capsule, Take 1,000 mg by mouth Daily., Disp: , Rfl:   •  azithromycin (Zithromax Z-Harinder) 250 MG tablet, Take 2 tablets by mouth on day 1, then 1 tablet daily on days 2-5, Disp: 6 tablet, Rfl: 0    Medications Discontinued During This Encounter   Medication Reason   • sulfamethoxazole-trimethoprim (BACTRIM DS,SEPTRA DS) 800-160 MG per tablet *Therapy completed   • buPROPion XL (Wellbutrin XL) 300 MG 24 hr tablet Reorder   • metoprolol succinate XL (TOPROL-XL) 200 MG 24 hr tablet Reorder   • primidone (MYSOLINE) 50 MG tablet Reorder   • atorvastatin (LIPITOR) 10 MG tablet Reorder         Review of Systems   Constitutional: Negative for fever.   Respiratory: Positive for cough. Negative for shortness of breath.    Cardiovascular: Negative for chest pain, palpitations and leg swelling.   Neurological: Negative for dizziness, weakness, numbness  "and headache.        Objective         Vitals:    12/14/22 1322   BP: 124/62   BP Location: Right arm   Patient Position: Sitting   Cuff Size: Adult   Pulse: 52   Resp: 16   Temp: 97.5 °F (36.4 °C)   TempSrc: Temporal   SpO2: 96%   Weight: 97.7 kg (215 lb 6.4 oz)   Height: 182.9 cm (72.01\")     Body mass index is 29.21 kg/m².         Physical Exam  Vitals reviewed.   Constitutional:       Appearance: Normal appearance. He is well-developed.   HENT:      Head: Normocephalic and atraumatic.      Mouth/Throat:      Pharynx: No oropharyngeal exudate.   Eyes:      Conjunctiva/sclera: Conjunctivae normal.      Pupils: Pupils are equal, round, and reactive to light.   Cardiovascular:      Rate and Rhythm: Normal rate and regular rhythm.      Heart sounds: Normal heart sounds. No murmur heard.    No friction rub. No gallop.   Pulmonary:      Effort: Pulmonary effort is normal.      Breath sounds: Normal breath sounds. No wheezing or rhonchi.   Skin:     General: Skin is warm and dry.   Neurological:      Mental Status: He is alert and oriented to person, place, and time.   Psychiatric:         Mood and Affect: Mood and affect normal.         Behavior: Behavior normal.         Thought Content: Thought content normal.         Judgment: Judgment normal.             Result Review :               Assessment and Plan     Diagnoses and all orders for this visit:    1. Moderate episode of recurrent major depressive disorder (HCC) (Primary)    2. Essential hypertension  -     Comprehensive Metabolic Panel; Future  -     Lipid Panel With / Chol / HDL Ratio; Future  -     Urinalysis With Culture If Indicated -; Future  -     CBC (No Diff); Future  -     Lipid Panel With / Chol / HDL Ratio; Future  -     Comprehensive Metabolic Panel; Future  -     Urinalysis With Culture If Indicated -; Future  -     CBC (No Diff); Future  -     metoprolol succinate XL (TOPROL-XL) 200 MG 24 hr tablet; Take 1 tablet by mouth Daily.  Dispense: 90 " tablet; Refill: 1    3. Anxiety disorder, unspecified type    4. Mixed hyperlipidemia  -     atorvastatin (LIPITOR) 10 MG tablet; Take 1 tablet by mouth Daily.  Dispense: 90 tablet; Refill: 1    5. Insomnia, unspecified type    6. Vitamin D deficiency  -     Vitamin D 25 hydroxy; Future  -     Vitamin D 25 hydroxy; Future    7. Anxiety  -     buPROPion XL (Wellbutrin XL) 300 MG 24 hr tablet; Take 1 tablet by mouth Daily.  Dispense: 90 tablet; Refill: 1    8. Essential hypertension  Comments:  Increase metoprolol to 200mg and watch pressures at home and if drops or gets dizzy will adjust  Orders:  -     Comprehensive Metabolic Panel; Future  -     Lipid Panel With / Chol / HDL Ratio; Future  -     Urinalysis With Culture If Indicated -; Future  -     CBC (No Diff); Future  -     Lipid Panel With / Chol / HDL Ratio; Future  -     Comprehensive Metabolic Panel; Future  -     Urinalysis With Culture If Indicated -; Future  -     CBC (No Diff); Future  -     metoprolol succinate XL (TOPROL-XL) 200 MG 24 hr tablet; Take 1 tablet by mouth Daily.  Dispense: 90 tablet; Refill: 1    9. Tremor  -     primidone (MYSOLINE) 50 MG tablet; Take 1 tablet by mouth 3 (Three) Times a Day.  Dispense: 270 tablet; Refill: 1    10. Acute cough  -     azithromycin (Zithromax Z-Harinder) 250 MG tablet; Take 2 tablets by mouth on day 1, then 1 tablet daily on days 2-5  Dispense: 6 tablet; Refill: 0              Follow Up     No follow-ups on file.    Patient was given instructions and counseling regarding his condition or for health maintenance advice. Please see specific information pulled into the AVS if appropriate.

## 2022-12-19 DIAGNOSIS — F41.9 ANXIETY: ICD-10-CM

## 2022-12-19 RX ORDER — CLONAZEPAM 1 MG/1
TABLET ORAL
Qty: 60 TABLET | Refills: 0 | Status: SHIPPED | OUTPATIENT
Start: 2022-12-19 | End: 2023-01-30

## 2023-01-26 DIAGNOSIS — F41.9 ANXIETY: ICD-10-CM

## 2023-01-26 RX ORDER — CLONAZEPAM 1 MG/1
TABLET ORAL
Qty: 60 TABLET | Refills: 0 | OUTPATIENT
Start: 2023-01-26

## 2023-01-30 DIAGNOSIS — F41.9 ANXIETY: ICD-10-CM

## 2023-01-30 RX ORDER — CLONAZEPAM 1 MG/1
TABLET ORAL
Qty: 60 TABLET | Refills: 0 | Status: SHIPPED | OUTPATIENT
Start: 2023-01-30 | End: 2023-03-01

## 2023-02-22 ENCOUNTER — PATIENT OUTREACH (OUTPATIENT)
Dept: CASE MANAGEMENT | Facility: OTHER | Age: 73
End: 2023-02-22
Payer: MEDICARE

## 2023-02-22 DIAGNOSIS — I10 ESSENTIAL HYPERTENSION: Primary | ICD-10-CM

## 2023-02-22 NOTE — OUTREACH NOTE
AMBULATORY CASE MANAGEMENT NOTE    Name and Relationship of Patient/Support Person:  -     CCM Interim Update    Made CCM outreach with patient. He was last seen by PCP in December and she increased his Metoprolol to 200 mg daily. He states his blood pressure has been better but still high, ranging 150/80's-90's. He has not had any dizzy spells, states he can unusually tell when its elevated because he will get a CORDOBA.  He seen Dr. Contreras last in 2018, and hasn't been back he said because he didn't think he needed too, requested the last OV note from them.  He said he has been sleeping pretty good, just wakes up early, however he tends to nap during the day.  Denies any new issues with his breathing, he still gets SOA with exertion. Reminded him about follow-up appointment with Pulmonology in April and he has labs from last July they ordered that he hasn't gotten done yet. Patient said he knows he needs to get them done, will try to do that.   No needs at this time.    Education Documentation  Provider Follow-Up, taught by Sushila Zayas, RN at 2/22/2023 12:09 PM.  Learner: Patient  Readiness: Acceptance  Method: Explanation  Response: Verbalizes Understanding    Blood Pressure Monitoring, taught by Sushila Zayas, RN at 2/22/2023 12:09 PM.  Learner: Patient  Readiness: Acceptance  Method: Explanation  Response: Verbalizes Understanding        Sushila ARMSTRONG  Ambulatory Case Management  2/22/2023, 12:10 EST

## 2023-02-23 DIAGNOSIS — F41.9 ANXIETY: ICD-10-CM

## 2023-02-23 RX ORDER — CLONAZEPAM 1 MG/1
TABLET ORAL
Qty: 60 TABLET | Refills: 0 | OUTPATIENT
Start: 2023-02-23

## 2023-02-27 ENCOUNTER — LAB (OUTPATIENT)
Dept: LAB | Facility: HOSPITAL | Age: 73
End: 2023-02-27
Payer: MEDICARE

## 2023-02-27 DIAGNOSIS — F41.9 ANXIETY: ICD-10-CM

## 2023-02-27 DIAGNOSIS — Z51.81 MEDICATION MONITORING ENCOUNTER: Primary | ICD-10-CM

## 2023-02-27 DIAGNOSIS — Z51.81 MEDICATION MONITORING ENCOUNTER: ICD-10-CM

## 2023-02-27 LAB
AMPHET+METHAMPHET UR QL: NEGATIVE
BARBITURATES UR QL SCN: POSITIVE
BENZODIAZ UR QL SCN: NEGATIVE
CANNABINOIDS SERPL QL: NEGATIVE
COCAINE UR QL: NEGATIVE
METHADONE UR QL SCN: NEGATIVE
OPIATES UR QL: NEGATIVE
OXYCODONE UR QL SCN: POSITIVE

## 2023-02-27 PROCEDURE — 80307 DRUG TEST PRSMV CHEM ANLYZR: CPT

## 2023-02-28 ENCOUNTER — TELEPHONE (OUTPATIENT)
Dept: FAMILY MEDICINE CLINIC | Facility: CLINIC | Age: 73
End: 2023-02-28
Payer: MEDICARE

## 2023-02-28 ENCOUNTER — PATIENT OUTREACH (OUTPATIENT)
Dept: CASE MANAGEMENT | Facility: OTHER | Age: 73
End: 2023-02-28
Payer: MEDICARE

## 2023-02-28 DIAGNOSIS — I10 ESSENTIAL HYPERTENSION: Primary | ICD-10-CM

## 2023-02-28 DIAGNOSIS — J44.9 CHRONIC OBSTRUCTIVE PULMONARY DISEASE, UNSPECIFIED COPD TYPE: ICD-10-CM

## 2023-02-28 PROCEDURE — 99490 CHRNC CARE MGMT STAFF 1ST 20: CPT | Performed by: NURSE PRACTITIONER

## 2023-02-28 NOTE — OUTREACH NOTE
[facility] End of Month Documentation    This Chronic Medical Management Care Plan for Jose Rios, 72 y.o. male, has been monitored and managed; reviewed and a new plan of care implemented for the month of February.  A cumulative time of 20  minutes was spent on this patient record this month, including phone call with patient; chart review; electronic communication with primary care provider.    Regarding the patient's problems: has Anemia; Anxiety disorder; Chronic obstructive pulmonary disease (HCC); CKD (chronic kidney disease) stage 3, GFR 30-59 ml/min (HCC); Essential hypertension; Hyperlipidemia; Hyponatremia; Major depressive disorder; MVP (mitral valve prolapse); JIM (obstructive sleep apnea); Osteoarthritis; Depression; Centrilobular emphysema (HCC); DDD (degenerative disc disease), cervical; Spondylosis without myelopathy; Tremor; Vitamin D deficiency; Tobacco abuse, in remission; Chronic dyspnea; and Lumbar spondylosis on their problem list., the following items were addressed: medical records; medications and any changes can be found within the plan section of the note.  A detailed listing of time spent for chronic care management is tracked within each outreach encounter.  Current medications include:  has a current medication list which includes the following prescription(s): tylenol, amlodipine, atorvastatin, azithromycin, breztri aerosphere, bupropion xl, chlorpheniramine, clonazepam, diclofenac sodium, losartan, metoprolol succinate xl, naloxone, nystatin, oxycodone-acetaminophen, pantoprazole, primidone, and turmeric. and the patient is reported to be patient is compliant with medication protocol,  Medications are reported to be non-effective in controlling symptoms and changes have been made to the medication protocol, Patient states that his BP is still 150's/80's.  Regarding these diagnoses no new referrals were made.  All notes on chart for PCP to review.    The patient was monitored remotely  for blood pressure; medications; activity level, labs.    The patient's physical needs include:  physical healthcare.     The patient's mental support needs include:  needs met    The patient's cognitive support needs include:  health care    The patient's psychosocial support needs include:  needs met    The patient's functional needs include: physical healthcare    The patient's environmental needs include:  not applicable    Care Plan overall comments:  N/A    Refer to previous outreach notes for more information on the areas listed above.    Monthly Billing Diagnoses  (I10) Essential hypertension    (J44.9) Chronic obstructive pulmonary disease, unspecified COPD type (HCC)    Medications   · Medications have been reconciled    Care Plan progress this month:      Recently Modified Care Plans Updates made since 1/28/2023 12:00 AM    No recently modified care plans.        Instructions   · Patient was provided an electronic copy of care plan  · CCM services were explained and offered and patient has accepted these services.  · Patient has given their written consent to receive CCM services and understands that this includes the authorization of electronic communication of medical information with the other treating providers.  · Patient understands that they may stop CCM services at any time and these changes will be effective at the end of the calendar month and will effectively revocate the agreement of CCM services.  · Patient understands that only one practitioner can furnish and be paid for CCM services during one calendar month.  Patient also understands that there may be co-payment or deductible fees in association with CCM services.  · Patient will continue with at least monthly follow-up calls with the Ambulatory .    Sushila ARMSTRONG  Ambulatory Case Management    2/28/2023, 16:28 EST

## 2023-03-01 RX ORDER — CLONAZEPAM 1 MG/1
TABLET ORAL
Qty: 60 TABLET | Refills: 0 | Status: SHIPPED | OUTPATIENT
Start: 2023-03-01 | End: 2023-03-29

## 2023-03-28 DIAGNOSIS — F41.9 ANXIETY: ICD-10-CM

## 2023-03-29 RX ORDER — CLONAZEPAM 1 MG/1
TABLET ORAL
Qty: 60 TABLET | Refills: 0 | Status: SHIPPED | OUTPATIENT
Start: 2023-03-29

## 2023-04-12 ENCOUNTER — TELEPHONE (OUTPATIENT)
Dept: CASE MANAGEMENT | Facility: OTHER | Age: 73
End: 2023-04-12
Payer: MEDICARE

## 2023-04-12 NOTE — TELEPHONE ENCOUNTER
#30 day chart review, no new appointments or changes noted in chart since February, patient cancelled his Pulmonary appointment.   Sushila Zayas RN  Ambulatory Case Management    4/12/2023, 15:13 EDT

## 2023-04-25 DIAGNOSIS — F41.9 ANXIETY: ICD-10-CM

## 2023-04-26 RX ORDER — CLONAZEPAM 1 MG/1
TABLET ORAL
Qty: 60 TABLET | Refills: 0 | Status: SHIPPED | OUTPATIENT
Start: 2023-04-26

## 2023-05-08 ENCOUNTER — TRANSCRIBE ORDERS (OUTPATIENT)
Dept: VASCULAR SURGERY | Facility: HOSPITAL | Age: 73
End: 2023-05-08
Payer: MEDICARE

## 2023-05-08 DIAGNOSIS — I71.43 INFRARENAL ABDOMINAL AORTIC ANEURYSM (AAA) WITHOUT RUPTURE: Primary | ICD-10-CM

## 2023-05-16 DIAGNOSIS — I10 ESSENTIAL HYPERTENSION: ICD-10-CM

## 2023-05-16 RX ORDER — AMLODIPINE BESYLATE 5 MG/1
5 TABLET ORAL DAILY
Qty: 90 TABLET | Refills: 1 | Status: SHIPPED | OUTPATIENT
Start: 2023-05-16

## 2023-05-23 DIAGNOSIS — F41.9 ANXIETY: ICD-10-CM

## 2023-05-24 ENCOUNTER — TELEPHONE (OUTPATIENT)
Dept: CASE MANAGEMENT | Facility: OTHER | Age: 73
End: 2023-05-24
Payer: MEDICARE

## 2023-05-24 RX ORDER — CLONAZEPAM 1 MG/1
TABLET ORAL
Qty: 60 TABLET | Refills: 0 | Status: SHIPPED | OUTPATIENT
Start: 2023-05-24

## 2023-05-24 NOTE — TELEPHONE ENCOUNTER
#60 day chart review.   Patient seen pain management for medication management. No other appointments, but has PCP follow-up next month.   Sushila Zayas RN  Ambulatory Case Management    5/24/2023, 15:41 EDT

## 2023-05-30 ENCOUNTER — HOSPITAL ENCOUNTER (OUTPATIENT)
Dept: CARDIOLOGY | Facility: HOSPITAL | Age: 73
Discharge: HOME | End: 2023-05-30

## 2023-05-30 ENCOUNTER — OFFICE VISIT (OUTPATIENT)
Dept: VASCULAR SURGERY | Facility: HOSPITAL | Age: 73
End: 2023-05-30

## 2023-05-30 VITALS
HEART RATE: 54 BPM | TEMPERATURE: 97.3 F | OXYGEN SATURATION: 95 % | SYSTOLIC BLOOD PRESSURE: 135 MMHG | DIASTOLIC BLOOD PRESSURE: 75 MMHG

## 2023-05-30 DIAGNOSIS — I71.43 INFRARENAL ABDOMINAL AORTIC ANEURYSM (AAA) WITHOUT RUPTURE: ICD-10-CM

## 2023-05-30 DIAGNOSIS — I71.43 INFRARENAL ABDOMINAL AORTIC ANEURYSM (AAA) WITHOUT RUPTURE: Primary | ICD-10-CM

## 2023-05-30 DIAGNOSIS — I71.21 ANEURYSM OF ASCENDING AORTA WITHOUT RUPTURE: ICD-10-CM

## 2023-05-30 LAB
ABDOMINAL DIST AORTA AP: 2.2 CM
ABDOMINAL DIST AORTA TRANS: 2.3 CM
ABDOMINAL LT COM ILIAC AP: 1.5 CM
ABDOMINAL LT COM ILIAC TRANS: 1.2 CM
ABDOMINAL MID AORTA AP: 4 CM
ABDOMINAL MID AORTA TRANS: 3.3 CM
ABDOMINAL PROX AORTA AP: 2.2 CM
ABDOMINAL PROX AORTA TRANS: 2.6 CM
ABDOMINAL RT COM ILIAC AP: 1.7 CM
ABDOMINAL RT COM ILIAC TRANS: 1.8 CM
BH CV VAS ABDOMINAL AO RESIDUAL LUMEN AP MEASURE: 4 CM
BH CV VAS ABDOMINAL AO RESIDUAL LUMEN TRANS MEASURE: 3.3 CM
MAXIMAL PREDICTED HEART RATE: 150 BPM
STRESS TARGET HR: 128 BPM

## 2023-05-30 PROCEDURE — 93978 VASCULAR STUDY: CPT

## 2023-05-30 NOTE — PROGRESS NOTES
[facility] Vascular Surgery Office Follow Up Note     Date of Encounter: 05/30/2023     MRN Number: [id number]  Name: Jose Rios  Phone Number:      Referred By: Griselda Powell APRN  PCP: Jagdeep Bonner APRN    Chief Complaint:    Chief Complaint   Patient presents with   • Follow-up     AAA F/U       Subjective      History of Present Illness:    Jose Rios is a 72 y.o. male presents for annual follow-up with an aorta duplex performed today.  He denies any abdominal or back pain.  He denies any known family history of abdominal aneurysms.  He is a former smoker. No other complaints at this time.    Review of Systems:  ROS  Review of Systems   Constitutional: Negative.   HENT: Negative.    Cardiovascular: Negative.    Respiratory: Negative.    Skin: Negative.    Musculoskeletal: Negative.    Gastrointestinal: Negative.    Neurological: Negative.    Psychiatric/Behavioral: Negative.      I have reviewed the following portions of the patient's history: allergies, current medications, past family history, past medical history, past social history, past surgical history and problem list and confirm it's accurate.    Allergies:  Allergies   Allergen Reactions   • Penicillins Swelling       Medications:      Current Outpatient Medications:   •  Acetaminophen (Tylenol) 325 MG capsule, Tylenol, Disp: , Rfl:   •  amLODIPine (NORVASC) 5 MG tablet, TAKE 1 TABLET BY MOUTH DAILY., Disp: 90 tablet, Rfl: 1  •  atorvastatin (LIPITOR) 10 MG tablet, Take 1 tablet by mouth Daily., Disp: 90 tablet, Rfl: 1  •  azithromycin (Zithromax Z-Harinder) 250 MG tablet, Take 2 tablets by mouth on day 1, then 1 tablet daily on days 2-5, Disp: 6 tablet, Rfl: 0  •  Breztri Aerosphere 160-9-4.8 MCG/ACT aerosol inhaler, Inhale 2 puffs Daily., Disp: , Rfl:   •  buPROPion XL (Wellbutrin XL) 300 MG 24 hr tablet, Take 1 tablet by mouth Daily., Disp: 90 tablet, Rfl: 1  •  chlorpheniramine (CHLOR-TRIMETON) 4 MG tablet, Take 4 mg by  mouth Every 6 (Six) Hours As Needed for Allergies., Disp: , Rfl:   •  clonazePAM (KlonoPIN) 1 MG tablet, TAKE ONE TABLET BY MOUTH TWO TIMES A DAY, Disp: 60 tablet, Rfl: 0  •  Diclofenac Sodium (VOLTAREN) 1 % gel gel, Apply  topically to the appropriate area as directed 2 (Two) Times a Day., Disp: 150 g, Rfl: 2  •  losartan (COZAAR) 50 MG tablet, TAKE 1 TABLET BY MOUTH 2 TIMES A DAY., Disp: 180 tablet, Rfl: 1  •  metoprolol succinate XL (TOPROL-XL) 200 MG 24 hr tablet, Take 1 tablet by mouth Daily., Disp: 90 tablet, Rfl: 1  •  naloxone (NARCAN) 4 MG/0.1ML nasal spray, naloxone 4 mg/actuation nasal spray  ONE SPRAY IN ONE NOSTRIL AS NEEDED FOR OVER SEDATION OR RESPIRATORY DEPRESSION FROM OPIOID USE. REPEAT ONE SPRAY IN ALTERNATE NOSTRIL EV<MORE>, Disp: , Rfl:   •  nystatin (MYCOSTATIN) 423332 UNIT/GM powder, APPLY TO THE AFFECTED AREA(S) BY TOPICAL ROUTE 3 TIMES PER DAY, Disp: 60 g, Rfl: 2  •  oxyCODONE-acetaminophen (PERCOCET)  MG per tablet, TAKE 1 TABLET EVERY 8 HOURS BY ORAL ROUTE AS NEEDED FOR 30 DAYS., Disp: , Rfl:   •  pantoprazole (PROTONIX) 40 MG EC tablet, TAKE 1 TABLET BY MOUTH DAILY., Disp: 90 tablet, Rfl: 1  •  primidone (MYSOLINE) 50 MG tablet, Take 1 tablet by mouth 3 (Three) Times a Day., Disp: 270 tablet, Rfl: 1  •  Turmeric 500 MG capsule, Take 1,000 mg by mouth Daily., Disp: , Rfl:     History:   Past Medical History:   Diagnosis Date   • Acid reflux disease    • Anemia 10/24/2016   • Anxiety    • Anxiety disorder 09/21/2017    HE STATES THAT HIS ANXIETY HAS BEEN WORSE. [facility] AND TRIED SSRI'S W/O MUCH BENEFIT. HE STATES THAT KLONOPIN HELPED, BUT STOPPED GOING AND THEN STOPPED KLONOPIN. RESTART KLONOPIN.    • Arthritis    • Arthritis     GENERALIZED  R KNEE   • Bipolar affect, depressed    • Bipolar disorder    • Cervical radiculopathy 12/15/2015   • Cervical spinal stenosis 12/15/2015   • Cervicalgia 04/03/2018   • Chronic bronchitis    • Chronic pain of both  knees 02/02/2018   • CKD (chronic kidney disease) stage 3, GFR 30-59 ml/min 02/02/2018   • COPD (chronic obstructive pulmonary disease)    • COPD (chronic obstructive pulmonary disease)     USES INHALERS   • Depression    • Esophageal reflux    • Essential hypertension 07/29/2019   • Folic acid deficiency 11/02/2017   • Foraminal stenosis of cervical region 05/10/2018   • Gastric reflux    • Head injury    • Hemorrhoids     RECTAL PROB (ACID REFLUX)   • Herniated disc, cervical 10/21/2014    C5-6, RIGHT   • Hyperlipidemia    • Hypertension    • Hypertension     CONTROLLED WITH MEDS   • Hyponatremia 10/03/2016, 11/9/2015   • Limb swelling    • Lung disease    • Major depressive disorder 07/29/2019   • Mediastinal adenopathy 10/03/2016   • Migraine    • MVP (mitral valve prolapse) 10/07/2015   • JIM (obstructive sleep apnea) 10/03/2016   • Osteoarthritis 07/29/2019   • Pneumonia    • Shortness of breath    • Slurred speech 12/01/2016   • SOB (shortness of breath)    • Tremor of right hand 10/07/2015   • Ulnar neuritis 10/18/2018    CLINICAL       Past Surgical History:   Procedure Laterality Date   • APPENDECTOMY     • APPENDECTOMY      45-50 YRS AGO   • BACK SURGERY     • CERVICAL DISC SURGERY  1996    INTERVERTEBRAL; C5-6 FUSION   • CERVICAL FUSION  11/05/2014, 3/11/2015    C5-6, C6-7   • COLONOSCOPY N/A 1/4/2022    Procedure: COLONOSCOPY;  Surgeon: Parviz Ash MD;  [facility];  Service: General;  Laterality: N/A;   • JOINT REPLACEMENT      KNEE RIGHT   • NERVE SURGERY      LEFT ARM   • ORTHOPEDIC SURGERY Left     MIDDLE TRIGGER FINGER SX rtk)   • OTHER SURGICAL HISTORY Left     ARM SURGERY-NERVE DAMAGE REPAIR LEFT ARM    • SPINE SURGERY      SURGERY ON C-5-C6 IN 1996 AND 1999)       Social History     Socioeconomic History   • Marital status:    Tobacco Use   • Smoking status: Former     Packs/day: 1.00     Years: 50.00     Pack years: 50.00     Types: Cigarettes     Quit date: 08/2016      Years since quittin.8   • Smokeless tobacco: Never   Vaping Use   • Vaping Use: Never used   Substance and Sexual Activity   • Alcohol use: Yes     Comment: 1 or 2 a amonth    • Drug use: Never   • Sexual activity: Defer     Partners: Female        Family History   Problem Relation Age of Onset   • Heart disease Mother    • Stroke Mother    • Arthritis Mother    • Heart disease Father    • Heart disease Maternal Grandfather    • Cancer Brother    • Malig Hyperthermia Neg Hx        Objective     Physical Exam:  Vitals:    23 0843   BP: 135/75   BP Location: Right arm   Patient Position: Sitting   Cuff Size: Adult   Pulse: 54   Temp: 97.3 °F (36.3 °C)   TempSrc: Infrared   SpO2: 95%   PainSc: 0-No pain      There is no height or weight on file to calculate BMI.    Physical Exam  Physical Exam  Constitutional:       Appearance: Normal appearance.   HENT:      Head: Normocephalic.   Cardiovascular:      Rate and Rhythm: Normal rate.      Pulses: Normal pulses.   Pulmonary:      Effort: Pulmonary effort is normal.   Musculoskeletal:         General: Normal range of motion.      Cervical back: Normal range of motion.   Skin:     General: Skin is warm and dry.      Capillary Refill: Capillary refill takes less than 2 seconds.      Neurological:      General: No focal deficit present.      Mental Status: Alert and oriented to person, place, and time.   Psychiatric:         Mood and Affect: Mood normal.         Behavior: Behavior normal.    Imaging/Labs:  I have reviewed the preliminary results of the aorta duplex performed today.  The duplex reveals the mid aorta at approximately 4 cm x 3.3 cm.  The previous study it measured 3.9 x 4 cm.  I have also reviewed the chest CT's that were performed the last one being on 07/15/22, they measure a thoracic ascending aorta at 4.6 cm. It has remained the same since .      Assessment / Plan      Assessment / Plan:  Diagnoses and all orders for this visit:    1.  Infrarenal abdominal aortic aneurysm (AAA) without rupture (Primary)    2. Aneurysm of ascending aorta without rupture       Mr. Rios has an abdominal aortic aneurysm is measuring approximately 4 cm in the mid aorta on the duplex performed today.  He has known since approximately 2016 about that the AAA which was originally measured approximately 3.5 cm.  He also has an asending thoracic aortic aneurysm that measures approximately 4.6 cm and has been stable since 2020.  I recommend to continue annual surveillance with a aorta duplex and follow-up in 1 year.      I have answered all of his questions and he is in agreement with the plan.  Thank you for allowing me to participate in your patient's care.    Patient Education: AAA and family history, advised that children should be screened between the ages 40-50.        Follow Up:   No follow-ups on file.   Or sooner for any further concerns or worsening sign and symptoms. If unable to reach us in the office please dial 911 or go to the nearest emergency department.      Griselda PEREZ  [facility] Vascular Surgery

## 2023-06-13 ENCOUNTER — TELEPHONE (OUTPATIENT)
Dept: FAMILY MEDICINE CLINIC | Facility: CLINIC | Age: 73
End: 2023-06-13
Payer: MEDICARE

## 2023-06-13 RX ORDER — BUDESONIDE, GLYCOPYRROLATE, AND FORMOTEROL FUMARATE 160; 9; 4.8 UG/1; UG/1; UG/1
AEROSOL, METERED RESPIRATORY (INHALATION)
Qty: 10.7 G | Refills: 11 | Status: SHIPPED | OUTPATIENT
Start: 2023-06-13

## 2023-06-13 RX ORDER — ALBUTEROL SULFATE 90 UG/1
AEROSOL, METERED RESPIRATORY (INHALATION)
Qty: 8.5 G | Refills: 10 | Status: SHIPPED | OUTPATIENT
Start: 2023-06-13

## 2023-06-19 DIAGNOSIS — F41.9 ANXIETY: ICD-10-CM

## 2023-06-19 RX ORDER — CLONAZEPAM 1 MG/1
TABLET ORAL
Qty: 60 TABLET | Refills: 0 | Status: SHIPPED | OUTPATIENT
Start: 2023-06-19

## 2023-08-11 ENCOUNTER — HOSPITAL ENCOUNTER (OUTPATIENT)
Dept: CT IMAGING | Facility: HOSPITAL | Age: 73
Discharge: HOME | End: 2023-08-11
Admitting: NURSE PRACTITIONER
Payer: MEDICARE

## 2023-08-11 DIAGNOSIS — F17.211 CIGARETTE NICOTINE DEPENDENCE IN REMISSION: ICD-10-CM

## 2023-08-11 PROCEDURE — 71271 CT THORAX LUNG CANCER SCR C-: CPT

## 2023-08-14 DIAGNOSIS — F41.9 ANXIETY: ICD-10-CM

## 2023-08-14 RX ORDER — CLONAZEPAM 1 MG/1
TABLET ORAL
Qty: 60 TABLET | Refills: 0 | Status: SHIPPED | OUTPATIENT
Start: 2023-08-14

## 2023-08-14 RX ORDER — AMLODIPINE BESYLATE 10 MG/1
10 TABLET ORAL DAILY
Qty: 30 TABLET | Refills: 0 | Status: SHIPPED | OUTPATIENT
Start: 2023-08-14

## 2023-09-06 ENCOUNTER — APPOINTMENT (OUTPATIENT)
Dept: CT IMAGING | Facility: HOSPITAL | Age: 73
End: 2023-09-06
Payer: MEDICARE

## 2023-09-06 ENCOUNTER — APPOINTMENT (OUTPATIENT)
Dept: GENERAL RADIOLOGY | Facility: HOSPITAL | Age: 73
End: 2023-09-06
Payer: MEDICARE

## 2023-09-06 ENCOUNTER — HOSPITAL ENCOUNTER (EMERGENCY)
Facility: HOSPITAL | Age: 73
Discharge: HOME | End: 2023-09-06
Attending: EMERGENCY MEDICINE
Payer: MEDICARE

## 2023-09-06 VITALS
HEIGHT: 72 IN | RESPIRATION RATE: 17 BRPM | HEART RATE: 65 BPM | WEIGHT: 213.63 LBS | BODY MASS INDEX: 28.93 KG/M2 | SYSTOLIC BLOOD PRESSURE: 141 MMHG | DIASTOLIC BLOOD PRESSURE: 82 MMHG | TEMPERATURE: 97.8 F | OXYGEN SATURATION: 97 %

## 2023-09-06 DIAGNOSIS — E87.1 CHRONIC HYPONATREMIA: ICD-10-CM

## 2023-09-06 DIAGNOSIS — F10.920 ACUTE ALCOHOLIC INTOXICATION WITHOUT COMPLICATION: Primary | ICD-10-CM

## 2023-09-06 DIAGNOSIS — S51.011A SKIN TEAR OF RIGHT ELBOW WITHOUT COMPLICATION, INITIAL ENCOUNTER: ICD-10-CM
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
## 2023-09-06 LAB
ALBUMIN SERPL-MCNC: 4.3 G/DL (ref 3.5–5.2)
ALBUMIN/GLOB SERPL: 1.2 G/DL
ALP SERPL-CCNC: 95 U/L (ref 39–117)
ALT SERPL W P-5'-P-CCNC: 29 U/L (ref 1–41)
ANION GAP SERPL CALCULATED.3IONS-SCNC: 15.6 MMOL/L (ref 5–15)
APAP SERPL-MCNC: <5 MCG/ML (ref 0–30)
AST SERPL-CCNC: 37 U/L (ref 1–40)
BASOPHILS # BLD AUTO: 0.05 10*3/MM3 (ref 0–0.2)
BASOPHILS NFR BLD AUTO: 0.6 % (ref 0–1.5)
BILIRUB SERPL-MCNC: 0.4 MG/DL (ref 0–1.2)
BUN SERPL-MCNC: 10 MG/DL (ref 8–23)
BUN/CREAT SERPL: 10.3 (ref 7–25)
CALCIUM SPEC-SCNC: 8.9 MG/DL (ref 8.6–10.5)
CHLORIDE SERPL-SCNC: 87 MMOL/L (ref 98–107)
CO2 SERPL-SCNC: 20.4 MMOL/L (ref 22–29)
CREAT SERPL-MCNC: 0.97 MG/DL (ref 0.76–1.27)
DEPRECATED RDW RBC AUTO: 41.1 FL (ref 37–54)
EGFRCR SERPLBLD CKD-EPI 2021: 84 ML/MIN/1.73
EOSINOPHIL # BLD AUTO: 0.13 10*3/MM3 (ref 0–0.4)
EOSINOPHIL NFR BLD AUTO: 1.7 % (ref 0.3–6.2)
ERYTHROCYTE [DISTWIDTH] IN BLOOD BY AUTOMATED COUNT: 12.7 % (ref 12.3–15.4)
ETHANOL BLD-MCNC: 188 MG/DL (ref 0–10)
ETHANOL UR QL: 0.19 %
GLOBULIN UR ELPH-MCNC: 3.7 GM/DL
GLUCOSE SERPL-MCNC: 88 MG/DL (ref 65–99)
HCT VFR BLD AUTO: 41.7 % (ref 37.5–51)
HGB BLD-MCNC: 14.7 G/DL (ref 13–17.7)
HOLD SPECIMEN: NORMAL
IMM GRANULOCYTES # BLD AUTO: 0.04 10*3/MM3 (ref 0–0.05)
IMM GRANULOCYTES NFR BLD AUTO: 0.5 % (ref 0–0.5)
LYMPHOCYTES # BLD AUTO: 1.66 10*3/MM3 (ref 0.7–3.1)
LYMPHOCYTES NFR BLD AUTO: 21.3 % (ref 19.6–45.3)
MCH RBC QN AUTO: 31.2 PG (ref 26.6–33)
MCHC RBC AUTO-ENTMCNC: 35.3 G/DL (ref 31.5–35.7)
MCV RBC AUTO: 88.5 FL (ref 79–97)
MONOCYTES # BLD AUTO: 0.94 10*3/MM3 (ref 0.1–0.9)
MONOCYTES NFR BLD AUTO: 12.1 % (ref 5–12)
NEUTROPHILS NFR BLD AUTO: 4.97 10*3/MM3 (ref 1.7–7)
NEUTROPHILS NFR BLD AUTO: 63.8 % (ref 42.7–76)
NRBC BLD AUTO-RTO: 0 /100 WBC (ref 0–0.2)
PLATELET # BLD AUTO: 180 10*3/MM3 (ref 140–450)
PMV BLD AUTO: 9.2 FL (ref 6–12)
POTASSIUM SERPL-SCNC: 4.4 MMOL/L (ref 3.5–5.2)
PROT SERPL-MCNC: 8 G/DL (ref 6–8.5)
RBC # BLD AUTO: 4.71 10*6/MM3 (ref 4.14–5.8)
SALICYLATES SERPL-MCNC: <0.3 MG/DL
SODIUM SERPL-SCNC: 123 MMOL/L (ref 136–145)
WBC NRBC COR # BLD: 7.79 10*3/MM3 (ref 3.4–10.8)
WHOLE BLOOD HOLD COAG: NORMAL
WHOLE BLOOD HOLD SPECIMEN: NORMAL

## 2023-09-06 PROCEDURE — 80179 DRUG ASSAY SALICYLATE: CPT

## 2023-09-06 PROCEDURE — 80053 COMPREHEN METABOLIC PANEL: CPT

## 2023-09-06 PROCEDURE — 73080 X-RAY EXAM OF ELBOW: CPT

## 2023-09-06 PROCEDURE — 72125 CT NECK SPINE W/O DYE: CPT

## 2023-09-06 PROCEDURE — 82077 ASSAY SPEC XCP UR&BREATH IA: CPT

## 2023-09-06 PROCEDURE — 80143 DRUG ASSAY ACETAMINOPHEN: CPT | Performed by: EMERGENCY MEDICINE

## 2023-09-06 PROCEDURE — 70450 CT HEAD/BRAIN W/O DYE: CPT

## 2023-09-06 PROCEDURE — 85025 COMPLETE CBC W/AUTO DIFF WBC: CPT

## 2023-09-06 PROCEDURE — 99285 EMERGENCY DEPT VISIT HI MDM: CPT

## 2023-09-06 NOTE — ED NOTES
Contact with  James Gabriel for Mr. Jose Rios to be picked up. Spouse expressed understanding and stated that someone will be there momentarily.

## 2023-09-06 NOTE — DISCHARGE INSTRUCTIONS
Please try to cut back on your drinking alcohol.    Your CT scan of the head and neck looked okay and right elbow x-rays looked okay today.      Please talk to your doctor about your chronically low sodium levels.

## 2023-09-06 NOTE — ED PROVIDER NOTES
Time: 5:30 AM EDT  Date of encounter:  9/6/2023  Independent Historian/Clinical History and Information was obtained by:   Patient and Nursing Staff    History is limited by: N/A    Chief Complaint: Alcohol intoxication, fall, elbow injury      History of Present Illness:  Patient is a 72 y.o. year old male with some history of alcohol abuse who presents to the emergency department for evaluation of fall and right elbow skin tear in the setting of alcohol intoxication.    He came in by ambulance.    There was concern that he hit his head and was not acting normally, so a CT of the head was ordered.    He denies trying to harm himself or overdose intentionally.        HPI    Patient Care Team  Primary Care Provider: Jagdeep Bonner APRN    Past Medical History:     Allergies   Allergen Reactions    Penicillins Swelling     Past Medical History:   Diagnosis Date    Acid reflux disease     Anemia 10/24/2016    Anxiety     Anxiety disorder 09/21/2017    HE STATES THAT HIS ANXIETY HAS BEEN WORSE. HE HAS GONE TO [facility] IN THE PAST AND TRIED SSRI'S W/O MUCH BENEFIT. HE STATES THAT KLONOPIN HELPED, BUT STOPPED GOING AND THEN STOPPED KLONOPIN. RESTART KLONOPIN.     Arthritis     Arthritis     GENERALIZED  R KNEE    Bipolar affect, depressed     Bipolar disorder     Cervical radiculopathy 12/15/2015    Cervical spinal stenosis 12/15/2015    Cervicalgia 04/03/2018    Chronic bronchitis     Chronic pain of both knees 02/02/2018    CKD (chronic kidney disease) stage 3, GFR 30-59 ml/min 02/02/2018    COPD (chronic obstructive pulmonary disease)     COPD (chronic obstructive pulmonary disease)     USES INHALERS    Depression     Esophageal reflux     Essential hypertension 07/29/2019    Folic acid deficiency 11/02/2017    Foraminal stenosis of cervical region 05/10/2018    Gastric reflux     Head injury     Hemorrhoids     RECTAL PROB (ACID REFLUX)    Herniated disc, cervical 10/21/2014    C5-6, RIGHT    Hyperlipidemia      Hypertension     Hypertension     CONTROLLED WITH MEDS    Hyponatremia 10/03/2016, 11/9/2015    Limb swelling     Lung disease     Major depressive disorder 07/29/2019    Mediastinal adenopathy 10/03/2016    Migraine     MVP (mitral valve prolapse) 10/07/2015    JIM (obstructive sleep apnea) 10/03/2016    Osteoarthritis 07/29/2019    Pneumonia     Shortness of breath     Slurred speech 12/01/2016    SOB (shortness of breath)     Tremor of right hand 10/07/2015    Ulnar neuritis 10/18/2018    CLINICAL     Past Surgical History:   Procedure Laterality Date    APPENDECTOMY      APPENDECTOMY      45-50 YRS AGO    BACK SURGERY      CERVICAL DISC SURGERY  1996    INTERVERTEBRAL; C5-6 FUSION    CERVICAL FUSION  11/05/2014, 3/11/2015    C5-6, C6-7    COLONOSCOPY N/A 1/4/2022    Procedure: COLONOSCOPY;  Surgeon: Parviz Ash MD;  Location: [facility] ENDOSCOPY;  Service: General;  Laterality: N/A;    JOINT REPLACEMENT      KNEE RIGHT    NERVE SURGERY      LEFT ARM    ORTHOPEDIC SURGERY Left     MIDDLE TRIGGER FINGER SX rtk)    OTHER SURGICAL HISTORY Left     ARM SURGERY-NERVE DAMAGE REPAIR LEFT ARM     SPINE SURGERY      SURGERY ON C-5-C6 IN 1996 AND 1999)     Family History   Problem Relation Age of Onset    Heart disease Mother     Stroke Mother     Arthritis Mother     Heart disease Father     Heart disease Maternal Grandfather     Cancer Brother     Malig Hyperthermia Neg Hx        Home Medications:  Prior to Admission medications    Medication Sig Start Date End Date Taking? Authorizing Provider   Acetaminophen (Tylenol) 325 MG capsule Tylenol    Provider, MD Jade   albuterol sulfate  (90 Base) MCG/ACT inhaler INHALE 2 PUFFS EVERY 4 (FOUR) HOURS AS NEEDED FOR WHEEZING OR SHORTNESS OF AIR FOR UP TO 30 DAYS. 6/13/23   Percy Kruger MD   amLODIPine (NORVASC) 10 MG tablet TAKE 1 TABLET BY MOUTH DAILY. 8/14/23   Jagdeep Bonner APRN   atorvastatin (LIPITOR) 10 MG tablet Take 1 tablet by mouth Daily. 7/17/23    Jagdeep Bonner APRN   Adal Aerosphere 160-9-4.8 MCG/ACT aerosol inhaler INHALE 2 PUFFS BY MOUTH TWO TIMES A DAY 6/13/23   Percy Kruger MD   buPROPion XL (Wellbutrin XL) 300 MG 24 hr tablet Take 1 tablet by mouth Daily. 12/14/22   Jagdeep Bonner APRN   chlorpheniramine (CHLOR-TRIMETON) 4 MG tablet Take 1 tablet by mouth Every 6 (Six) Hours As Needed for Allergies.    Jade Moon MD   clonazePAM (KlonoPIN) 1 MG tablet TAKE ONE TABLET BY MOUTH TWO TIMES A DAY 8/14/23   Jagdeep Bonner APRN   Diclofenac Sodium (VOLTAREN) 1 % gel gel Apply  topically to the appropriate area as directed 2 (Two) Times a Day. 7/17/23   Jagdeep Bonner APRN   losartan (COZAAR) 50 MG tablet TAKE 1 TABLET BY MOUTH 2 TIMES A DAY. 11/11/22   Jagdeep Bonner APRN   metoprolol succinate XL (TOPROL-XL) 200 MG 24 hr tablet Take 1 tablet by mouth Daily. 7/17/23   Jagdeep Bonner APRN   naloxone (NARCAN) 4 MG/0.1ML nasal spray naloxone 4 mg/actuation nasal spray   ONE SPRAY IN ONE NOSTRIL AS NEEDED FOR OVER SEDATION OR RESPIRATORY DEPRESSION FROM OPIOID USE. REPEAT ONE SPRAY IN ALTERNATE NOSTRIL EV<MORE>    Jade Moon MD   nystatin (MYCOSTATIN) 702950 UNIT/GM powder APPLY TO THE AFFECTED AREA(S) BY TOPICAL ROUTE 3 TIMES PER DAY 11/10/21   Jagdeep Bonner APRN   oxyCODONE-acetaminophen (PERCOCET)  MG per tablet TAKE 1 TABLET EVERY 8 HOURS BY ORAL ROUTE AS NEEDED FOR 30 DAYS. 7/16/21   Jade Moon MD   pantoprazole (PROTONIX) 40 MG EC tablet Take 1 tablet by mouth Daily. 7/17/23   Jagdeep Bonner APRN   primidone (MYSOLINE) 50 MG tablet Take 1 tablet by mouth 3 (Three) Times a Day. 12/14/22   Jagdeep Bonner APRN   Turmeric 500 MG capsule Take 2 capsules by mouth Daily.    Jade Moon MD        Social History:   Social History     Tobacco Use    Smoking status: Former     Packs/day: 1.00     Years: 50.00     Pack years: 50.00     Types: Cigarettes     Quit date: 08/2016     Years since quitting:  "7.1    Smokeless tobacco: Never   Vaping Use    Vaping Use: Never used   Substance Use Topics    Alcohol use: Yes     Comment: 1 or 2 a amonth     Drug use: Never         Review of Systems:  Review of Systems   I performed a 10 point review of systems which was all negative, except for the positives found in the HPI above.      Physical Exam:  /82   Pulse 65   Temp 97.8 °F (36.6 °C) (Oral)   Resp 17   Ht 182.9 cm (72\")   Wt 96.9 kg (213 lb 10 oz)   SpO2 97%   BMI 28.97 kg/m²       Physical Exam   General: Awake alert and in no obvious distress, intoxicated, slurring his words    HEENT: Head normocephalic atraumatic, eyes PERRLA EOMI, nose normal, oropharynx normal.    Neck: Supple full range of motion, no meningismus, no lymphadenopathy    Heart: Regular rate and rhythm, no murmurs or rubs, 2+ radial pulses bilaterally    Lungs: Clear to auscultation bilaterally without wheezes or crackles, no respiratory distress    Abdomen: Soft, nontender, nondistended, no rebound or guarding    Skin: Warm, dry, no rash    Musculoskeletal: Normal range of motion, no lower extremity edema, moderate-sized superficial skin tear to lateral right elbow oozing blood but nothing requiring suturing today, full range of motion of right elbow without pain.    Neurologic: Oriented x3, no motor deficits no sensory deficits    Psychiatric: Mood appears stable, no psychosis          Procedures:  Procedures      Medical Decision Making:      Comorbidities that affect care:    Alcohol abuse    External Notes reviewed:    None      The following orders were placed and all results were independently analyzed by me:  Orders Placed This Encounter   Procedures    XR Elbow 3+ View Right    CT Head Without Contrast    CT Cervical Spine Without Contrast    [place] Draw    [place] Draw    Acetaminophen Level    Comprehensive Metabolic Panel    Ethanol    Salicylate Level    CBC Auto Differential    Green Top (Gel)    Lavender Top    Gold Top " - SST    Light Blue Top    Extra Tubes    Gray Top    Green Top (Gel)    Lavender Top    Gold Top - SST    Light Blue Top    CBC & Differential       Medications Given in the Emergency Department:  Medications - No data to display     ED Course:         Labs:    Lab Results (last 24 hours)       Procedure Component Value Units Date/Time    Acetaminophen Level [[id number]]  (Normal) Collected: 09/06/23 0103    Specimen: Blood Updated: 09/06/23 0149     Acetaminophen <5.0 mcg/mL     CBC & Differential [[id number]]  (Abnormal) Collected: 09/06/23 0103    Specimen: Blood Updated: 09/06/23 0318    Narrative:      The following orders were created for panel order CBC & Differential.  Procedure                               Abnormality         Status                     ---------                               -----------         ------                     CBC Auto Differential[[id number]]        Abnormal            Final result                 Please view results for these tests on the individual orders.    Comprehensive Metabolic Panel [[id number]]  (Abnormal) Collected: 09/06/23 0103    Specimen: Blood Updated: 09/06/23 0345     Glucose 88 mg/dL      BUN 10 mg/dL      Creatinine 0.97 mg/dL      Sodium 123 mmol/L      Potassium 4.4 mmol/L      Comment: Specimen hemolyzed.  Results may be affected.        Chloride 87 mmol/L      CO2 20.4 mmol/L      Calcium 8.9 mg/dL      Total Protein 8.0 g/dL      Albumin 4.3 g/dL      ALT (SGPT) 29 U/L      Comment: Specimen hemolyzed.  Results may be affected.        AST (SGOT) 37 U/L      Comment: Specimen hemolyzed.  Results may be affected.        Alkaline Phosphatase 95 U/L      Total Bilirubin 0.4 mg/dL      Globulin 3.7 gm/dL      A/G Ratio 1.2 g/dL      BUN/Creatinine Ratio 10.3     Anion Gap 15.6 mmol/L      eGFR 84.0 mL/min/1.73     Narrative:      GFR Normal >60  Chronic Kidney Disease <60  Kidney Failure <15      Ethanol [[id number]]  (Abnormal) Collected: 09/06/23 0103     Specimen: Blood Updated: 09/06/23 0330     Ethanol 188 mg/dL      Ethanol % 0.188 %     Narrative:      Ethanol (Plasma)  <10 Essentially Negative    Toxic Concentrations           mg/dL    Flushing, slowing of reflexes    Impaired visual activity         Depression of CNS              >100  Possible Coma                  >300       Salicylate Level [[id number]]  (Normal) Collected: 09/06/23 0103    Specimen: Blood Updated: 09/06/23 0330     Salicylate <0.3 mg/dL     CBC Auto Differential [[id number]]  (Abnormal) Collected: 09/06/23 0103    Specimen: Blood Updated: 09/06/23 0318     WBC 7.79 10*3/mm3      RBC 4.71 10*6/mm3      Hemoglobin 14.7 g/dL      Hematocrit 41.7 %      MCV 88.5 fL      MCH 31.2 pg      MCHC 35.3 g/dL      RDW 12.7 %      RDW-SD 41.1 fl      MPV 9.2 fL      Platelets 180 10*3/mm3      Neutrophil % 63.8 %      Lymphocyte % 21.3 %      Monocyte % 12.1 %      Eosinophil % 1.7 %      Basophil % 0.6 %      Immature Grans % 0.5 %      Neutrophils, Absolute 4.97 10*3/mm3      Lymphocytes, Absolute 1.66 10*3/mm3      Monocytes, Absolute 0.94 10*3/mm3      Eosinophils, Absolute 0.13 10*3/mm3      Basophils, Absolute 0.05 10*3/mm3      Immature Grans, Absolute 0.04 10*3/mm3      nRBC 0.0 /100 WBC              Imaging:    XR Elbow 3+ View Right    Result Date: 9/6/2023  PROCEDURE: XR ELBOW 3+ VW RIGHT  COMPARISON: None.  INDICATIONS: FALL (TODAY); + LACERATION (RIGHT POSTERIOR ELBOW).  FINDINGS: 3 views were obtained.  No acute fracture or acute malalignment is identified.  No joint effusion is identified.  No retained radiopaque foreign body.  Mild enthesopathic and degenerative changes are noted.  External artifacts obscure detail.  If symptoms or clinical concerns persist, consider imaging follow-up.       No acute fracture or acute malalignment is identified.    Please note that portions of this note were completed with a voice recognition program.  SWETHA TERAN JR, MD        Electronically Signed and Approved By: SWETHA TERAN JR, MD on 9/06/2023 at 3:08              CT Head Without Contrast    Result Date: 9/6/2023  PROCEDURE: CT HEAD WO CONTRAST  COMPARISON: 10/8/2020.  INDICATIONS: HEAD TRAUMA, MODERATE-TO-SEVERE; POOR HISTORIAN.  PROTOCOL:   Standard CT imaging protocol performed.    RADIATION:   Total DLP: 1,079 mGy*cm.   MA and/or KV were/was adjusted to minimize radiation dose.    TECHNIQUE: After obtaining the patient's consent, 139 CT images were obtained without non-ionic intravenous contrast material.  DISCUSSION:  A routine nonenhanced head CT was performed. No acute brain abnormality is identified. No acute intracranial hemorrhage. No acute infarction. No acute skull fracture. No midline shift or acute intracranial mass effect is seen.  Moderate to severe chronic small vessel ischemia/infarction is suspected. There are arterial calcifications. The extra-axial spaces and the ventricular system are prominent.  New or increased moderate-to-severe age-indeterminate mucosal thickening and/or retained secretions involve(s) the imaged paranasal sinuses.  These findings may be acute superimposed upon chronic in nature.  There are degenerative changes of the partially visualized atlantoaxial joint.  No significant acute findings are seen with regard to the imaged orbits or middle ear clefts.        No acute brain abnormality is seen.  No acute intracranial hemorrhage.  No acute skull fracture.  New or increased moderate-to-severe acute, superimposed upon chronic, sinus disease is possible.    Please note that portions of this note were completed with a voice recognition program.  SWETHA TERAN JR, MD       Electronically Signed and Approved By: SWETHA TERAN JR, MD on 9/06/2023 at 4:05              CT Cervical Spine Without Contrast    Result Date: 9/6/2023  PROCEDURE: CT CERVICAL SPINE WO CONTRAST  COMPARISON: None.  INDICATIONS: FALL; POOR HISTORIAN.  PROTOCOL:   Standard CT  imaging protocol performed.    RADIATION:   Total DLP: 575.9 mGy*cm.   MA and/or KV were/was adjusted to minimize radiation dose.    TECHNIQUE: After obtaining the patient's consent, multi-planar CT images were created without contrast material.  There is slight motion artifact on the exam.  EXAM FINDINGS: A routine nonenhanced cervical spine CT was performed. Sagittal and coronal two-dimensional reformations are provided for review. No acute cervical spine fracture or acute malalignment is identified. Small nonspecific bilateral cervical lymph nodes are seen.  Moderate-to-severe degenerative changes are seen at multiple levels within the imaged spine including disc and endplate degenerative changes as well as facet and uncovertebral osteoarthritis.  Degenerative changes involve the atlantoaxial joint also.  It is suspected that the patient has undergone anterior surgical fusion at several levels, such as at C3-4 and C6-7 with suspected interval removal of prior hardware at each level.  Anterior fusion instrumentation remains in place at C5-6 and C7-T1.  The hardware appears intact by CT with near-anatomic alignment.  There is a right-sided laminectomy at approximately C5.  Neural foraminal narrowing is seen at several levels; for instance, left-greater-than-right at C2-3, right-greater-than-left at C3-4, left-greater-than-right at C4-5, and right-greater-than-left at C5-6 are noted.  The greatest degree of spinal canal narrowing is at C3-4, C5-6, C6-7, and C7-T1.  There are arterial calcifications.  Please see the nonenhanced head CT exam report for further detail regarding additional findings such as sinus disease.        No acute cervical spine fracture is seen.  No acute subluxation abnormality.  There are postoperative changes, as detailed above.  Degenerative changes are present at several levels.    Please note that portions of this note were completed with a voice recognition program.  SWETHA TERAN JR, MD        Electronically Signed and Approved By: SWETHA TERAN JR, MD on 9/06/2023 at 4:18                 Differential Diagnosis and Discussion:    Intoxication due to alcohol versus pain medication, fall with elbow injury and head injury    All labs were reviewed and interpreted by me.  All X-rays impressions were independently interpreted by me.  CT scan radiology impression was interpreted by me.    MDM     Amount and/or Complexity of Data Reviewed  Decide to obtain previous medical records or to obtain history from someone other than the patient: yes           This patient is a 72-year-old male that was intoxicated with alcohol and lost his balance and fell at home resulting in a superficial skin tear to the right elbow that was oozing blood on arrival.    There was also initially concern for possible head and neck injury in the setting of intoxication so we ended up getting a CT of the head and cervical spine which both came back negative for acute findings.    Plain films of right elbow are unremarkable and we performed basic wound care and dressed the wound here.    We allowed him to sober up in the ED and observed him for several hours and it looks like he is appropriate to be discharged home with family at this time.      I recognize his sodium levels are low but on review of baseline labs it looks like it is his baseline level and chronic hyponatremia seen.  I will have him follow-up closely with his primary care physician.            Patient Care Considerations:          Consultants/Shared Management Plan:        Social Determinants of Health:    Patient is independent, reliable, and has access to care.       Disposition and Care Coordination:    Discharged: I considered escalation of care by admitting this patient for observation, however the patient has improved and is suitable and  stable for discharge.    I have explained the patient´s condition, diagnoses and treatment plan based on the information  available to me at this time. I have answered questions and addressed any concerns. The patient has a good  understanding of the patient´s diagnosis, condition, and treatment plan as can be expected at this point. The vital signs have been stable. The patient´s condition is stable and appropriate for discharge from the emergency department.      The patient will pursue further outpatient evaluation with the primary care physician or other designated or consulting physician as outlined in the discharge instructions. They are agreeable to this plan of care and follow-up instructions have been explained in detail. The patient has received these instructions in written format and have expressed an understanding of the discharge instructions. The patient is aware that any significant change in condition or worsening of symptoms should prompt an immediate return to this or the closest emergency department or call to 911.  I have explained discharge medications and the need for follow up with the patient/caretakers. This was also printed in the discharge instructions. Patient was discharged with the following medications and follow up:      Medication List      No changes were made to your prescriptions during this visit.      Jagdeep Bonner, APRN  [address]  [facility] [id number] [phone]    Call in 1 day  for a follow-up appointment       Final diagnoses:   Acute alcoholic intoxication without complication   Fall, initial encounter   Skin tear of right elbow without complication, initial encounter   Chronic hyponatremia        ED Disposition       ED Disposition   Discharge    Condition   Stable    Comment   --               This medical record created using voice recognition software.             Leonid Robles MD  09/06/23 0529

## 2023-09-06 NOTE — ED NOTES
Pt comes to the ER tonight for a fall. ETOH on board. Pt admitted to EMS that he had 8 beers tonight. EMS reports that they found a empty oxycodone 325mg bottle that was refilled on 8/19/2023 and quantity in bottle was 90 pills. Pt is suppose to take every 8 hrs. Pt states he is not having any pain. Pt states that he has not taken his oxycodone since yesterday.

## 2023-09-08 DIAGNOSIS — F41.9 ANXIETY: ICD-10-CM

## 2023-09-08 RX ORDER — CLONAZEPAM 1 MG/1
TABLET ORAL
Qty: 60 TABLET | Refills: 0 | Status: SHIPPED | OUTPATIENT
Start: 2023-09-08

## 2023-09-14 RX ORDER — AMLODIPINE BESYLATE 10 MG/1
10 TABLET ORAL DAILY
Qty: 30 TABLET | Refills: 0 | Status: SHIPPED | OUTPATIENT
Start: 2023-09-14

## 2023-10-09 DIAGNOSIS — F41.9 ANXIETY: ICD-10-CM

## 2023-10-09 RX ORDER — CLONAZEPAM 1 MG/1
TABLET ORAL
Qty: 60 TABLET | Refills: 0 | Status: SHIPPED | OUTPATIENT
Start: 2023-10-09

## 2023-10-20 ENCOUNTER — TELEPHONE (OUTPATIENT)
Dept: FAMILY MEDICINE CLINIC | Facility: CLINIC | Age: 73
End: 2023-10-20
Payer: MEDICARE

## 2023-10-20 DIAGNOSIS — G47.33 OSA (OBSTRUCTIVE SLEEP APNEA): Primary | ICD-10-CM

## 2023-10-20 NOTE — TELEPHONE ENCOUNTER
Caller: Jose Rios    Relationship: Self    Best call back number: [phone]     What medication are you requesting: CPAP SUPPLIES    If a prescription is needed, what is your preferred pharmacy and phone number:      [facility] - [address]

## 2023-11-06 DIAGNOSIS — F41.9 ANXIETY: ICD-10-CM

## 2023-11-06 RX ORDER — CLONAZEPAM 1 MG/1
TABLET ORAL
Qty: 60 TABLET | Refills: 0 | Status: SHIPPED | OUTPATIENT
Start: 2023-11-06

## 2023-11-15 DIAGNOSIS — I10 ESSENTIAL HYPERTENSION: ICD-10-CM

## 2023-11-15 RX ORDER — LOSARTAN POTASSIUM 50 MG/1
TABLET ORAL
Qty: 180 TABLET | Refills: 1 | Status: SHIPPED | OUTPATIENT
Start: 2023-11-15

## 2023-11-15 RX ORDER — AMLODIPINE BESYLATE 10 MG/1
10 TABLET ORAL DAILY
Qty: 90 TABLET | Refills: 1 | Status: SHIPPED | OUTPATIENT
Start: 2023-11-15

## 2023-11-16 ENCOUNTER — TELEPHONE (OUTPATIENT)
Dept: FAMILY MEDICINE CLINIC | Facility: CLINIC | Age: 73
End: 2023-11-16
Payer: MEDICARE

## 2023-11-16 DIAGNOSIS — M47.816 LUMBAR SPONDYLOSIS: Primary | ICD-10-CM

## 2023-11-16 NOTE — TELEPHONE ENCOUNTER
Caller: Jose Rios    Relationship: Self    Best call back number: [phone]    What is the medical concern/diagnosis: BACK PAIN     What specialty or service is being requested: PAIN CLINIC     What is the provider, practice or medical service name: [facility]

## 2023-12-06 DIAGNOSIS — F41.9 ANXIETY: ICD-10-CM

## 2023-12-06 RX ORDER — CLONAZEPAM 1 MG/1
TABLET ORAL
Qty: 60 TABLET | Refills: 0 | Status: SHIPPED | OUTPATIENT
Start: 2023-12-06

## 2023-12-16 NOTE — PROGRESS NOTES
Chief Complaint  Foot Pain (bilateral)    Subjective        Jose Rios presents to [facility]  Both feet have been hurting for years the last 3 years.  Gets shots in right foot through podiatry.  Left has started hurting worse over the last few days.  Going tomorrow for shot in right foot.  Hasn't had in 3 years.      Falling more but states is tripping over things.  Dtates has been getting a little dizzy.  Blood pressure 96/64.          Past History:    Medical History: has a past medical history of Acid reflux disease, Anxiety, Arthritis, Bipolar disorder (CMS/HCC), Chronic bronchitis (CMS/HCC), COPD (chronic obstructive pulmonary disease) (CMS/[facility]), Depression, Hemorrhoids, Hypertension, and SOB (shortness of breath).     Surgical History: has a past surgical history that includes Appendectomy; orthopedic surgery (Left); Spine surgery; Joint replacement; and Nerve Surgery.     Family History: family history includes Heart disease in his father, maternal grandfather, and mother.     Social History: reports that he quit smoking about 5 years ago. His smoking use included cigarettes. He has a 50.00 pack-year smoking history. He has never used smokeless tobacco. He reports previous alcohol use. He reports that he does not use drugs.    Allergies: Penicillins          Current Outpatient Medications:   •  albuterol (ACCUNEB) 1.25 MG/3ML nebulizer solution, Take 1 ampule by nebulization Every 6 (Six) Hours As Needed., Disp: , Rfl:   •  amLODIPine (NORVASC) 10 MG tablet, Take 1 tablet by mouth Daily., Disp: 90 tablet, Rfl: 1  •  atorvastatin (LIPITOR) 10 MG tablet, TAKE 1 TABLET (10 MG) BY MOUTH DAILY AT BEDTIME, Disp: 90 tablet, Rfl: 1  •  azithromycin (Zithromax Z-Harinder) 250 MG tablet, Take 2 tablets by mouth on day 1, then 1 tablet daily on days 2-5, Disp: 6 tablet, Rfl: 0  •  clonazePAM (KlonoPIN) 1 MG tablet, TAKE ONE TABLET BY MOUTH TWO TIMES A DAY, Disp: 60 tablet, Rfl: 0  •   Diclofenac Sodium (VOLTAREN) 1 % gel gel, diclofenac 1 % topical gel  APPLY 2 GRAMS TO THE AFFECTED AREA(S) FOUR TIMES A DAY, Disp: , Rfl:   •  DULoxetine (CYMBALTA) 60 MG capsule, Take 1 capsule by mouth Daily., Disp: 30 capsule, Rfl: 1  •  losartan (COZAAR) 50 MG tablet, TAKE ONE TABLET BY MOUTH TWO TIMES A DAY, Disp: 180 tablet, Rfl: 1  •  metoprolol succinate XL (TOPROL-XL) 200 MG 24 hr tablet, Take 1 tablet by mouth Daily., Disp: 90 tablet, Rfl: 1  •  oxyCODONE-acetaminophen (PERCOCET)  MG per tablet, TAKE 1 TABLET EVERY 8 HOURS BY ORAL ROUTE AS NEEDED FOR 30 DAYS., Disp: , Rfl:   •  pantoprazole (Protonix) 40 MG EC tablet, Take 1 tablet by mouth Daily., Disp: , Rfl:   •  primidone (MYSOLINE) 50 MG tablet, primidone 50 mg tablet  TAKE 1 TABLET BY ORAL ROUTE 3 TIMES A DAY, Disp: , Rfl:   •  Fluticasone Furoate-Vilanterol (Breo Ellipta) 200-25 MCG/INH inhaler, Inhale 1 puff Daily for 30 days. Rinse mouth out after each use., Disp: 1 each, Rfl: 11  •  tiotropium bromide monohydrate (Spiriva Respimat) 2.5 MCG/ACT aerosol solution inhaler, Inhale 2 puffs Daily for 30 days., Disp: 1 each, Rfl: 11    There are no discontinued medications.      Review of Systems   Constitutional: Negative for fever.   Respiratory: Negative for cough and shortness of breath.    Cardiovascular: Negative for chest pain, palpitations and leg swelling.   Neurological: Negative for dizziness, weakness, numbness and headache.        Objective         Vitals:    09/01/21 1131   BP: 96/64   BP Location: Right arm   Patient Position: Sitting   Cuff Size: Large Adult   Pulse: 59   Resp: 16   Temp: 96.8 °F (36 °C)   TempSrc: Infrared   SpO2: 91%     There is no height or weight on file to calculate BMI.         Physical Exam  Vitals reviewed.   Constitutional:       Appearance: Normal appearance. He is well-developed.   HENT:      Head: Normocephalic and atraumatic.      Mouth/Throat:      Pharynx: No oropharyngeal exudate.   Eyes:       Conjunctiva/sclera: Conjunctivae normal.      Pupils: Pupils are equal, round, and reactive to light.   Cardiovascular:      Rate and Rhythm: Normal rate and regular rhythm.      Heart sounds: No murmur heard.   No friction rub. No gallop.    Pulmonary:      Effort: Pulmonary effort is normal.      Breath sounds: Normal breath sounds. No wheezing or rhonchi.   Musculoskeletal:      Right foot: Tenderness present.      Left foot: Tenderness present.        Legs:    Skin:     General: Skin is warm and dry.   Neurological:      Mental Status: He is alert and oriented to person, place, and time.   Psychiatric:         Mood and Affect: Mood and affect normal.         Behavior: Behavior normal.         Thought Content: Thought content normal.         Judgment: Judgment normal.             Result Review :               Assessment and Plan     Diagnoses and all orders for this visit:    1. Pain in both feet (Primary)  -     Ambulatory Referral to Podiatry  -     XR Foot 3+ View Left; Future    2. Dizziness        Bring blood pressure cuff to office for us to compare with ours.      Follow Up     Return follow up as scheduled.    Patient was given instructions and counseling regarding his condition or for health maintenance advice. Please see specific information pulled into the AVS if appropriate.          Skilled Nursing Facility

## 2023-12-28 DIAGNOSIS — F41.9 ANXIETY: ICD-10-CM

## 2023-12-28 RX ORDER — CLONAZEPAM 1 MG/1
TABLET ORAL
Qty: 60 TABLET | Refills: 0 | Status: SHIPPED | OUTPATIENT
Start: 2023-12-28

## 2024-01-04 DIAGNOSIS — F41.9 ANXIETY: ICD-10-CM

## 2024-01-04 RX ORDER — PANTOPRAZOLE SODIUM 40 MG/1
40 TABLET, DELAYED RELEASE ORAL DAILY
Qty: 90 TABLET | Refills: 1 | Status: SHIPPED | OUTPATIENT
Start: 2024-01-04

## 2024-01-04 RX ORDER — BUPROPION HYDROCHLORIDE 300 MG/1
300 TABLET ORAL DAILY
Qty: 30 TABLET | Refills: 1 | Status: SHIPPED | OUTPATIENT
Start: 2024-01-04

## 2024-01-14 ENCOUNTER — TELEPHONE (OUTPATIENT)
Dept: PULMONOLOGY | Facility: CLINIC | Age: 74
End: 2024-01-14
Payer: MEDICARE

## 2024-01-29 DIAGNOSIS — F41.9 ANXIETY: ICD-10-CM

## 2024-01-29 RX ORDER — CLONAZEPAM 1 MG/1
TABLET ORAL
Qty: 60 TABLET | Refills: 0 | Status: SHIPPED | OUTPATIENT
Start: 2024-01-29

## 2024-02-02 DIAGNOSIS — I10 ESSENTIAL HYPERTENSION: ICD-10-CM

## 2024-02-02 RX ORDER — METOPROLOL SUCCINATE 200 MG/1
200 TABLET, EXTENDED RELEASE ORAL DAILY
Qty: 90 TABLET | Refills: 1 | Status: SHIPPED | OUTPATIENT
Start: 2024-02-02

## 2024-02-03 DIAGNOSIS — R25.1 TREMOR: ICD-10-CM

## 2024-02-05 RX ORDER — PRIMIDONE 50 MG/1
50 TABLET ORAL 3 TIMES DAILY
Qty: 270 TABLET | Refills: 1 | Status: SHIPPED | OUTPATIENT
Start: 2024-02-05

## 2024-02-10 ENCOUNTER — APPOINTMENT (OUTPATIENT)
Dept: GENERAL RADIOLOGY | Facility: HOSPITAL | Age: 74
End: 2024-02-10
Payer: MEDICARE

## 2024-02-10 ENCOUNTER — HOSPITAL ENCOUNTER (EMERGENCY)
Facility: HOSPITAL | Age: 74
Discharge: HOME | End: 2024-02-10
Attending: EMERGENCY MEDICINE
Payer: MEDICARE

## 2024-02-10 VITALS
WEIGHT: 213.63 LBS | SYSTOLIC BLOOD PRESSURE: 206 MMHG | OXYGEN SATURATION: 97 % | RESPIRATION RATE: 16 BRPM | DIASTOLIC BLOOD PRESSURE: 91 MMHG | BODY MASS INDEX: 28.93 KG/M2 | TEMPERATURE: 97.5 F | HEIGHT: 72 IN | HEART RATE: 77 BPM

## 2024-02-10 DIAGNOSIS — M25.512 ACUTE PAIN OF LEFT SHOULDER: Primary | ICD-10-CM

## 2024-02-10 LAB
QT INTERVAL: 393 MS
QTC INTERVAL: 440 MS

## 2024-02-10 PROCEDURE — 93005 ELECTROCARDIOGRAM TRACING: CPT | Performed by: EMERGENCY MEDICINE

## 2024-02-10 PROCEDURE — 99283 EMERGENCY DEPT VISIT LOW MDM: CPT

## 2024-02-10 PROCEDURE — 71045 X-RAY EXAM CHEST 1 VIEW: CPT

## 2024-02-10 PROCEDURE — 73030 X-RAY EXAM OF SHOULDER: CPT

## 2024-02-10 PROCEDURE — 93010 ELECTROCARDIOGRAM REPORT: CPT | Performed by: INTERNAL MEDICINE

## 2024-02-10 RX ORDER — HYDROCODONE BITARTRATE AND ACETAMINOPHEN 5; 325 MG/1; MG/1
1 TABLET ORAL ONCE AS NEEDED
Status: COMPLETED | OUTPATIENT
Start: 2024-02-10 | End: 2024-02-10

## 2024-02-10 RX ORDER — NAPROXEN 500 MG/1
500 TABLET ORAL 2 TIMES DAILY PRN
Qty: 20 TABLET | Refills: 0 | Status: SHIPPED | OUTPATIENT
Start: 2024-02-10

## 2024-02-10 RX ORDER — HYDROCODONE BITARTRATE AND ACETAMINOPHEN 5; 325 MG/1; MG/1
1 TABLET ORAL EVERY 6 HOURS PRN
Qty: 15 TABLET | Refills: 0 | Status: SHIPPED | OUTPATIENT
Start: 2024-02-10

## 2024-02-10 RX ADMIN — HYDROCODONE BITARTRATE AND ACETAMINOPHEN 1 TABLET: 5; 325 TABLET ORAL at 15:19

## 2024-02-10 NOTE — ED PROVIDER NOTES
Time: 3:00 PM EST  Date of encounter:  2/10/2024  Independent Historian/Clinical History and Information was obtained by:   Patient  Chief Complaint: Left shoulder pain    History is limited by: N/A    History of Present Illness:  Patient is a 73 y.o. year old male who presents to the emergency department for evaluation of left shoulder pain.  Patient presents ER complaining of severe pain in his left shoulder.  Patient reports pain began last night.  Patient reports it began while he was in bed.  Patient describes as a sharp stabbing pain.  This pain is exacerbated by movement of the arm.  Nothing seems to make the pain better.  At the same time the patient does not take anything for the pain.  Patient denies any physical activities yesterday and denies any recent injuries.  Patient is right-hand dominant.  Patient denies any chest pain, shortness of breath, or any prior episodes.    HPI    Patient Care Team  Primary Care Provider: Jagdeep Bonner APRN    Past Medical History:     Allergies   Allergen Reactions    Penicillins Swelling     Past Medical History:   Diagnosis Date    Acid reflux disease     Anemia 10/24/2016    Anxiety     Anxiety disorder 09/21/2017    HE STATES THAT HIS ANXIETY HAS BEEN WORSE. [facility] W/O MUCH BENEFIT. HE STATES THAT KLONOPIN HELPED, BUT STOPPED GOING AND THEN STOPPED KLONOPIN. RESTART KLONOPIN.     Arthritis     Arthritis     GENERALIZED  R KNEE    Bipolar affect, depressed     Bipolar disorder     Cervical radiculopathy 12/15/2015    Cervical spinal stenosis 12/15/2015    Cervicalgia 04/03/2018    Chronic bronchitis     Chronic pain of both knees 02/02/2018    CKD (chronic kidney disease) stage 3, GFR 30-59 ml/min 02/02/2018    COPD (chronic obstructive pulmonary disease)     COPD (chronic obstructive pulmonary disease)     USES INHALERS    Depression     Esophageal reflux     Essential hypertension 07/29/2019    Folic acid deficiency  11/02/2017    Foraminal stenosis of cervical region 05/10/2018    Gastric reflux     Head injury     Hemorrhoids     RECTAL PROB (ACID REFLUX)    Herniated disc, cervical 10/21/2014    C5-6, RIGHT    Hyperlipidemia     Hypertension     Hypertension     CONTROLLED WITH MEDS    Hyponatremia 10/03/2016, 11/9/2015    Limb swelling     Lung disease     Major depressive disorder 07/29/2019    Mediastinal adenopathy 10/03/2016    Migraine     MVP (mitral valve prolapse) 10/07/2015    JIM (obstructive sleep apnea) 10/03/2016    Osteoarthritis 07/29/2019    Pneumonia     Shortness of breath     Slurred speech 12/01/2016    SOB (shortness of breath)     Tremor of right hand 10/07/2015    Ulnar neuritis 10/18/2018    CLINICAL     Past Surgical History:   Procedure Laterality Date    APPENDECTOMY      APPENDECTOMY      45-50 YRS AGO    BACK SURGERY      CERVICAL DISC SURGERY  1996    INTERVERTEBRAL; C5-6 FUSION    CERVICAL FUSION  11/05/2014, 3/11/2015    C5-6, C6-7    COLONOSCOPY N/A 1/4/2022    Procedure: COLONOSCOPY;  Surgeon: Parviz Ash MD;  Location: [facility] END[facility]COPY;  Service: General;  Laterality: N/A;    JOINT REPLACEMENT      KNEE RIGHT    NERVE SURGERY      LEFT ARM    ORTHOPEDIC SURGERY Left     MIDDLE TRIGGER FINGER SX rtk)    OTHER SURGICAL HISTORY Left     ARM SURGERY-NERVE DAMAGE REPAIR LEFT ARM     SPINE SURGERY      SURGERY ON C-5-C6 IN 1996 AND 1999)     Family History   Problem Relation Age of Onset    Heart disease Mother     Stroke Mother     Arthritis Mother     Heart disease Father     Heart disease Maternal Grandfather     Cancer Brother     Malig Hyperthermia Neg Hx        Home Medications:  Prior to Admission medications    Medication Sig Start Date End Date Taking? Authorizing Provider   Acetaminophen (Tylenol) 325 MG capsule Tylenol    Provider, MD Jade   albuterol sulfate  (90 Base) MCG/ACT inhaler INHALE 2 PUFFS EVERY 4 (FOUR) HOURS AS NEEDED FOR WHEEZING OR SHORTNESS OF AIR FOR  UP TO 30 DAYS. 6/13/23   Percy Kruger MD   amLODIPine (NORVASC) 10 MG tablet TAKE 1 TABLET BY MOUTH DAILY. 11/15/23   Jagdeep Bonner APRN   atorvastatin (LIPITOR) 10 MG tablet Take 1 tablet by mouth Daily. 7/17/23   Jagdeep Bonner APRN   Bredennistri Aerosphere 160-9-4.8 MCG/ACT aerosol inhaler INHALE 2 PUFFS BY MOUTH TWO TIMES A DAY 6/13/23   Percy Kruger MD   buPROPion XL (WELLBUTRIN XL) 300 MG 24 hr tablet TAKE 1 TABLET BY MOUTH DAILY. 1/4/24   Jagdeep Bonner APRN   chlorpheniramine (CHLOR-TRIMETON) 4 MG tablet Take 1 tablet by mouth Every 6 (Six) Hours As Needed for Allergies.    Jade Moon MD   clonazePAM (KlonoPIN) 1 MG tablet TAKE ONE TABLET BY MOUTH TWO TIMES A DAY 1/29/24   Jagdeep Bonner APRN   Diclofenac Sodium (VOLTAREN) 1 % gel gel APPLY TO AFFECTED AREA TWICE DAILY 9/20/23   Jagdeep Bonner APRN   losartan (COZAAR) 50 MG tablet TAKE 1 TABLET BY MOUTH 2 TIMES A DAY. 11/15/23   Jagdeep Bonner APRN   metoprolol succinate XL (TOPROL-XL) 200 MG 24 hr tablet TAKE ONE TABLET BY MOUTH EVERY DAY 2/2/24   Jagdeep Bonner APRN   naloxone (NARCAN) 4 MG/0.1ML nasal spray naloxone 4 mg/actuation nasal spray   ONE SPRAY IN ONE NOSTRIL AS NEEDED FOR OVER SEDATION OR RESPIRATORY DEPRESSION FROM OPIOID USE. REPEAT ONE SPRAY IN ALTERNATE NOSTRIL EV<MORE>    Jade Moon MD   nystatin (MYCOSTATIN) 567918 UNIT/GM powder APPLY TO THE AFFECTED AREA(S) BY TOPICAL ROUTE 3 TIMES PER DAY 11/10/21   Jagdeep Bonner APRN   oxyCODONE-acetaminophen (PERCOCET)  MG per tablet TAKE 1 TABLET EVERY 8 HOURS BY ORAL ROUTE AS NEEDED FOR 30 DAYS. 7/16/21   Jade Moon MD   pantoprazole (PROTONIX) 40 MG EC tablet TAKE 1 TABLET BY MOUTH DAILY. 1/4/24   Jagdeep Bonner APRN   primidone (MYSOLINE) 50 MG tablet TAKE ONE TABLET BY MOUTH THREE TIMES A DAY 2/5/24   Jagdeep Bonner APRN   Turmeric 500 MG capsule Take 2 capsules by mouth Daily.    Provider, Historical, MD        Social History:   Social  "History     Tobacco Use    Smoking status: Former     Packs/day: 1.00     Years: 50.00     Additional pack years: 0.00     Total pack years: 50.00     Types: Cigarettes     Quit date: 2016     Years since quittin.5    Smokeless tobacco: Never   Vaping Use    Vaping Use: Never used   Substance Use Topics    Alcohol use: Yes     Comment: 1 or 2 a amonth     Drug use: Never         Review of Systems:  Review of Systems   Constitutional:  Negative for chills and fever.   HENT:  Negative for congestion, ear pain and sore throat.    Eyes:  Negative for pain.   Respiratory:  Negative for cough, chest tightness and shortness of breath.    Cardiovascular:  Negative for chest pain.   Gastrointestinal:  Negative for abdominal pain, diarrhea, nausea and vomiting.   Genitourinary:  Negative for flank pain and hematuria.   Musculoskeletal:  Positive for arthralgias (Left shoulder pain). Negative for joint swelling.   Skin:  Negative for pallor.   Neurological:  Negative for seizures and headaches.   All other systems reviewed and are negative.       Physical Exam:  BP (!) 206/91   Pulse 77   Temp 97.5 °F (36.4 °C) (Oral)   Resp 16   Ht 182.9 cm (72\")   Wt 96.9 kg (213 lb 10 oz)   SpO2 98%   BMI 28.97 kg/m²     Physical Exam    Vital signs were reviewed under triage note.  General appearance - Patient appears well-developed and well-nourished.  Patient is in no acute distress.  Head - Normocephalic, atraumatic.  Pupils - Equal, round, reactive to light.  Extraocular muscles are intact.  Conjunctiva is clear.  Nasal - Normal inspection.  No evidence of trauma or epistaxis.  Tympanic membranes - Gray, intact without erythema or retractions.  Oral mucosa - Pink and moist without lesions or erythema.  Uvula is midline.  Chest wall - Atraumatic.  Chest wall is nontender.  There are no vesicular rashes noted.  Neck - Supple.  Trachea was midline.  There is no palpable lymphadenopathy or thyromegaly.  There are no meningeal " signs  Lungs - Clear to auscultation and percussion bilaterally.  Heart - Regular rate and rhythm without any murmurs, clicks, or gallops.  Abdomen - Soft.  Bowel sounds are present.  There is no palpable tenderness.  There is no rebound, guarding, or rigidity.  There are no palpable masses.  There are no pulsatile masses.  Back - Spine is straight and midline.  There is no CVA tenderness.  Extremities - Intact x4.  Patient has limited range of motion of the left shoulder due to pain but has full range of motion at the wrist and elbow joints.  Patient has palpable reproducible tenderness with palpation on the superior and posterior portion of the shoulder region.  There is no palpable edema.  Pulses are intact x4 and equal.  Neurologic - Patient is awake, alert, and oriented x3.  Cranial nerves II through XII are grossly intact.  Motor and sensory functions grossly intact.  Cerebellar function was normal.  Integument - There are no rashes.  There are no petechia or purpura lesions noted.  There are no vesicular lesions noted.           Procedures:  Procedures      Medical Decision Making:      Comorbidities that affect care:    Anxiety, bipolar disorder, degenerative disc disease, COPD, chronic kidney disease stage III, hypertension, GERD, hyperlipidemia, hypertension, sleep apnea    External Notes reviewed:    Previous Clinic Note: Office visit with CHRIS Toussaint dated 7/17/2023 was reviewed by me.      The following orders were placed and all results were independently analyzed by me:  Orders Placed This Encounter   Procedures    [facility] 02.  Shoulder Immobilizer/Sling    XR Shoulder 2+ View Left    XR Chest 1 View    ECG 12 Lead Chest Pain       Medications Given in the Emergency Department:  Medications   HYDROcodone-acetaminophen (NORCO) 5-325 MG per tablet 1 tablet (has no administration in time range)        ED Course:    ED Course as of 02/10/24 1506   Sat Feb 10, 2024   1448 EKG  performed at 1355 was interpreted by me to show a normal sinus rhythm with a ventricular of 75 bpm.  The PA interval is 193 ms.  P waves are normal.  QRS interval is normal.  Axis was at -6 degrees.  There is no acute ischemic ST or T wave change identified.  QT corrected was 440 ms. [TB]      ED Course User Index  [TB] Tanner Duran DO     The patient was seen and evaluated in the ED by me.  The above history and physical examination was performed as documented.  Diagnostic data was obtained.  Results reviewed.  EKG was unremarkable.  After extended period time nursing staff never gisel the blood work.  Patient is ready to be discharged and does not want a wait.  Patient's symptoms are consistent with reproducible musculoskeletal pain.  Patient was treated with both NSAIDs and/or narcotics for pain control with outpatient follow-up.  Patient is agreeable to his treatment plan.    Labs:    Lab Results (last 24 hours)       ** No results found for the last 24 hours. **             Imaging:    XR Chest 1 View    Result Date: 2/10/2024  PROCEDURE: XR CHEST 1 VW  COMPARISON: [facility], CT, CT CHEST LOW DOSE CANCER SCREENING WO, 8/11/2023, 11:22.  INDICATIONS: HYPERTENSION, LEFT SHOULDER PAIN  FINDINGS:  The heart is not enlarged.  The lungs seem relatively clear.  There is slight prominence of markings in the interstitium of the right lower lobe.  An early inflammatory infectious process not excluded.  The left lung is clear.  Patient has had previous cervical spine surgery.        1. There are slightly prominent markings within the interstitium of the right lower lobe that may be secondary to an inflammatory infectious process.       DONI VILLAFUERTE MD       Electronically Signed and Approved By: DONI VILLAFUERTE MD on 2/10/2024 at 14:23             XR Shoulder 2+ View Left    Result Date: 2/10/2024  PROCEDURE: XR SHOULDER 2+ VW LEFT  COMPARISON: None  INDICATIONS: NO KNOWN INJURY COMPLAINS OF LEFT  SHOULDER PAIN  FINDINGS:  There is no fracture there is some age related changes involving the acromioclavicular joint.  There is no dislocation.  There is some suggested narrowing of the glenohumeral joint.        1. There are some degenerative changes about the shoulder.      DONI VILLAFUERTE MD       Electronically Signed and Approved By: DONI VILLAFUERTE MD on 2/10/2024 at 13:31                Differential Diagnosis and Discussion:    Extremity Pain: Differential diagnosis includes but is not limited to soft tissue sprain, tendonitis, tendon injury, dislocation, fracture, deep vein thrombosis, arterial insufficiency, osteoarthritis, bursitis, and ligamentous damage.    All X-rays impressions were independently interpreted by me.  EKG was interpreted by me.    MDM     Amount and/or Complexity of Data Reviewed  Tests in the radiology section of CPT®: reviewed  Tests in the medicine section of CPT®: reviewed             Patient Care Considerations:    LABS: I considered ordering labs, however laboratory data would not alter the ED treatment course or plan.      Consultants/Shared Management Plan:    None    Social Determinants of Health:    Patient is independent, reliable, and has access to care.       Disposition and Care Coordination:    Discharged: The patient is suitable and stable for discharge with no need for consideration of admission.    I have explained the patient´s condition, diagnoses and treatment plan based on the information available to me at this time. I have answered questions and addressed any concerns. The patient has a good  understanding of the patient´s diagnosis, condition, and treatment plan as can be expected at this point. The vital signs have been stable. The patient´s condition is stable and appropriate for discharge from the emergency department.      The patient will pursue further outpatient evaluation with the primary care physician or other designated or consulting physician as outlined  in the discharge instructions. They are agreeable to this plan of care and follow-up instructions have been explained in detail. The patient has received these instructions in written format and has expressed an understanding of the discharge instructions. The patient is aware that any significant change in condition or worsening of symptoms should prompt an immediate return to this or the closest emergency department or call to 911.  I have explained discharge medications and the need for follow up with the patient/caretakers. This was also printed in the discharge instructions. Patient was discharged with the following medications and follow up:      Medication List        New Prescriptions      HYDROcodone-acetaminophen 5-325 MG per tablet  Commonly known as: NORCO  Take 1 tablet by mouth Every 6 (Six) Hours As Needed for Moderate Pain.     naproxen 500 MG tablet  Commonly known as: NAPROSYN  Take 1 tablet by mouth 2 (Two) Times a Day As Needed for Moderate Pain.               Where to Get Your Medications        These medications were [place] [facility] [place] [address] - [phone] [facility] [phone] FX  [address], [place] [address]      Phone: [phone]   HYDROcodone-acetaminophen 5-325 MG per tablet  naproxen 500 MG tablet      Jagdeep Bonner, CHRIS  [address]  [address]  [facility] [address]  [phone]    In 1 week        Final diagnoses:   Acute pain of left shoulder        ED Disposition       ED Disposition   Discharge    Condition   Stable    Comment   --               This medical record created using voice recognition software.             Tanner Duran DO  02/11/24 [id number]

## 2024-02-10 NOTE — DISCHARGE INSTRUCTIONS
Avoid any strenuous activities with your left arm.  Apply warm and/or cold compresses for comfort.  Apply for 20 to 30 minutes 3-5 times per day.  Take the prescriptions as prescribed for pain control.  Follow-up your primary care provider in 7 to 10 days if symptoms or not improving.  Return to the ER for any chest pain, shortness of breath, weakness to the left arm, or any other concerns issues that may arise.

## 2024-02-27 DIAGNOSIS — F41.9 ANXIETY: ICD-10-CM

## 2024-02-27 RX ORDER — CLONAZEPAM 1 MG/1
TABLET ORAL
Qty: 60 TABLET | Refills: 0 | Status: SHIPPED | OUTPATIENT
Start: 2024-02-27

## 2024-02-28 DIAGNOSIS — E78.2 MIXED HYPERLIPIDEMIA: ICD-10-CM

## 2024-02-28 RX ORDER — ATORVASTATIN CALCIUM 10 MG/1
10 TABLET, FILM COATED ORAL DAILY
Qty: 90 TABLET | Refills: 1 | Status: SHIPPED | OUTPATIENT
Start: 2024-02-28

## 2024-03-21 DIAGNOSIS — F41.9 ANXIETY: ICD-10-CM

## 2024-03-21 RX ORDER — CLONAZEPAM 1 MG/1
TABLET ORAL
Qty: 60 TABLET | Refills: 0 | OUTPATIENT
Start: 2024-03-21

## 2024-03-25 DIAGNOSIS — F41.9 ANXIETY: ICD-10-CM

## 2024-04-05 DIAGNOSIS — I10 ESSENTIAL HYPERTENSION: ICD-10-CM

## 2024-04-08 RX ORDER — LOSARTAN POTASSIUM 50 MG/1
TABLET ORAL
Qty: 180 TABLET | Refills: 1 | Status: SHIPPED | OUTPATIENT
Start: 2024-04-08 | End: 2024-04-11 | Stop reason: SDUPTHER

## 2024-04-10 ENCOUNTER — TELEPHONE (OUTPATIENT)
Dept: FAMILY MEDICINE CLINIC | Facility: CLINIC | Age: 74
End: 2024-04-10
Payer: MEDICARE

## 2024-04-10 NOTE — TELEPHONE ENCOUNTER
Mobile # on file not in service.  Home # unable to leave voicemail.  [facility]   Confirming appointment with Jagdeep Bonner on 4/11/24  Hub to relay

## 2024-04-11 ENCOUNTER — OFFICE VISIT (OUTPATIENT)
Dept: FAMILY MEDICINE CLINIC | Facility: CLINIC | Age: 74
End: 2024-04-11
Payer: MEDICARE

## 2024-04-11 ENCOUNTER — TELEPHONE (OUTPATIENT)
Dept: FAMILY MEDICINE CLINIC | Facility: CLINIC | Age: 74
End: 2024-04-11

## 2024-04-11 VITALS
BODY MASS INDEX: 25.73 KG/M2 | HEART RATE: 59 BPM | OXYGEN SATURATION: 98 % | WEIGHT: 190 LBS | RESPIRATION RATE: 18 BRPM | SYSTOLIC BLOOD PRESSURE: 156 MMHG | DIASTOLIC BLOOD PRESSURE: 88 MMHG | HEIGHT: 72 IN

## 2024-04-11 DIAGNOSIS — M47.816 LUMBAR SPONDYLOSIS: ICD-10-CM

## 2024-04-11 DIAGNOSIS — G47.33 OSA (OBSTRUCTIVE SLEEP APNEA): ICD-10-CM

## 2024-04-11 DIAGNOSIS — R25.1 TREMOR: ICD-10-CM

## 2024-04-11 DIAGNOSIS — F41.9 ANXIETY: ICD-10-CM

## 2024-04-11 DIAGNOSIS — I10 ESSENTIAL HYPERTENSION: ICD-10-CM

## 2024-04-11 DIAGNOSIS — Z51.81 MEDICATION MONITORING ENCOUNTER: Primary | ICD-10-CM

## 2024-04-11 DIAGNOSIS — K21.9 GASTROESOPHAGEAL REFLUX DISEASE WITHOUT ESOPHAGITIS: ICD-10-CM

## 2024-04-11 DIAGNOSIS — F33.1 MODERATE EPISODE OF RECURRENT MAJOR DEPRESSIVE DISORDER: ICD-10-CM

## 2024-04-11 DIAGNOSIS — E78.2 MIXED HYPERLIPIDEMIA: ICD-10-CM

## 2024-04-11 DIAGNOSIS — J44.9 CHRONIC OBSTRUCTIVE PULMONARY DISEASE, UNSPECIFIED COPD TYPE: ICD-10-CM

## 2024-04-11 LAB
AMPHET+METHAMPHET UR QL: NEGATIVE
AMPHETAMINE INTERNAL CONTROL: NORMAL
AMPHETAMINES UR QL: NEGATIVE
BARBITURATE INTERNAL CONTROL: NORMAL
BARBITURATES UR QL SCN: NEGATIVE
BENZODIAZ UR QL SCN: NEGATIVE
BENZODIAZEPINE INTERNAL CONTROL: NORMAL
BUPRENORPHINE INTERNAL CONTROL: NORMAL
BUPRENORPHINE SERPL-MCNC: NEGATIVE NG/ML
CANNABINOIDS SERPL QL: NEGATIVE
COCAINE INTERNAL CONTROL: NORMAL
COCAINE UR QL: NEGATIVE
EXPIRATION DATE: NORMAL
Lab: NORMAL
MDMA (ECSTASY) INTERNAL CONTROL: NORMAL
MDMA UR QL SCN: NEGATIVE
METHADONE INTERNAL CONTROL: NORMAL
METHADONE UR QL SCN: NEGATIVE
METHAMPHETAMINE INTERNAL CONTROL: NORMAL
MORPHINE INTERNAL CONTROL: NORMAL
MORPHINE/OPIATES SCREEN, URINE: NEGATIVE
OXYCODONE INTERNAL CONTROL: NORMAL
OXYCODONE UR QL SCN: NEGATIVE
PCP UR QL SCN: NEGATIVE
PHENCYCLIDINE INTERNAL CONTROL: NORMAL
THC INTERNAL CONTROL: NORMAL

## 2024-04-11 RX ORDER — CLONAZEPAM 1 MG/1
TABLET ORAL
Qty: 60 TABLET | Refills: 0 | OUTPATIENT
Start: 2024-04-11

## 2024-04-11 RX ORDER — CLONAZEPAM 1 MG/1
1 TABLET ORAL 2 TIMES DAILY
Qty: 60 TABLET | Refills: 0 | Status: SHIPPED | OUTPATIENT
Start: 2024-04-11

## 2024-04-11 RX ORDER — PANTOPRAZOLE SODIUM 40 MG/1
40 TABLET, DELAYED RELEASE ORAL DAILY
Qty: 90 TABLET | Refills: 1 | Status: SHIPPED | OUTPATIENT
Start: 2024-04-11

## 2024-04-11 RX ORDER — BUPROPION HYDROCHLORIDE 300 MG/1
300 TABLET ORAL DAILY
Qty: 30 TABLET | Refills: 1 | OUTPATIENT
Start: 2024-04-11

## 2024-04-11 RX ORDER — BUPROPION HYDROCHLORIDE 300 MG/1
300 TABLET ORAL DAILY
Qty: 30 TABLET | Refills: 1 | Status: SHIPPED | OUTPATIENT
Start: 2024-04-11

## 2024-04-11 RX ORDER — PRIMIDONE 50 MG/1
50 TABLET ORAL 3 TIMES DAILY
Qty: 270 TABLET | Refills: 1 | Status: SHIPPED | OUTPATIENT
Start: 2024-04-11

## 2024-04-11 RX ORDER — LOSARTAN POTASSIUM 50 MG/1
50 TABLET ORAL 2 TIMES DAILY
Qty: 180 TABLET | Refills: 1 | Status: SHIPPED | OUTPATIENT
Start: 2024-04-11

## 2024-04-11 RX ORDER — ALBUTEROL SULFATE 90 UG/1
2 AEROSOL, METERED RESPIRATORY (INHALATION) EVERY 4 HOURS PRN
Qty: 8.5 G | Refills: 10 | Status: SHIPPED | OUTPATIENT
Start: 2024-04-11

## 2024-04-11 RX ORDER — CLONAZEPAM 1 MG/1
1 TABLET ORAL 2 TIMES DAILY
Qty: 60 TABLET | Refills: 0 | Status: CANCELLED | OUTPATIENT
Start: 2024-04-11

## 2024-04-11 RX ORDER — ATORVASTATIN CALCIUM 10 MG/1
10 TABLET, FILM COATED ORAL DAILY
Qty: 90 TABLET | Refills: 1 | Status: SHIPPED | OUTPATIENT
Start: 2024-04-11

## 2024-04-11 RX ORDER — AMLODIPINE BESYLATE 10 MG/1
10 TABLET ORAL DAILY
Qty: 90 TABLET | Refills: 1 | Status: SHIPPED | OUTPATIENT
Start: 2024-04-11

## 2024-04-11 RX ORDER — NALOXONE HCL 8 MG/.1ML
SPRAY NASAL
COMMUNITY

## 2024-04-11 RX ORDER — METOPROLOL SUCCINATE 200 MG/1
200 TABLET, EXTENDED RELEASE ORAL DAILY
Qty: 90 TABLET | Refills: 1 | Status: SHIPPED | OUTPATIENT
Start: 2024-04-11

## 2024-04-11 NOTE — TELEPHONE ENCOUNTER
Caller: Jose Rios    Relationship: Self    Best call back number: [phone]     What is the best time to reach you: ANY    Who are you requesting to speak with (clinical staff, provider,  specific staff member): CLINICAL STAFF    What was the call regarding: PATIENT CALLED STATING THAT HE FORGOT TO ASK FOR A SLEEP STUDY DURING HIS APPOINTMENT AND WOULD LIKE TO START THE PROCESS TO HAVE ONE DONE

## 2024-04-11 NOTE — TELEPHONE ENCOUNTER
Called and spoke to pt, he has been having snoring bad, witnessed apnea, fatigue    He had been sent back to the sleep center last year but they didn't take his insurance at the time, he has new insurance now and would like to go back.

## 2024-04-11 NOTE — PROGRESS NOTES
Chief Complaint  Hypertension and Anxiety    Subjective        Jose Rios presents to [facility]  History of Present Illness  Hypertension:  180/90  elevated today.    Anxiety:  doing well on the clonazepam.  States stays more stressed but daughter is moved back in to home and stressors.    Still feeling like is unbalanced but cane is helping  states he stays home now.    States doesn't use alcohol but rarely.  Aware doesn't go with benzodiazepines.  Hypertension  Associated symptoms include anxiety. Pertinent negatives include no chest pain, palpitations or shortness of breath.   Anxiety   Patient reports no chest pain, dizziness, palpitations or shortness of breath.       The following portions of the patient's history were personally reviewed and updated as appropriate: allergies, current medications, past medical history, past surgical history, past family history, and past social history.     Body mass index is 25.76 kg/m².           Past History:    Medical History: has a past medical history of Acid reflux disease, Anemia (10/24/2016), Anxiety, Anxiety disorder (09/21/2017), Arthritis, Arthritis, Bipolar affect, depressed, Bipolar disorder, Cervical radiculopathy (12/15/2015), Cervical spinal stenosis (12/15/2015), Cervicalgia (04/03/2018), Chronic bronchitis, Chronic pain of both knees (02/02/2018), CKD (chronic kidney disease) stage 3, GFR 30-59 ml/min (02/02/2018), COPD (chronic obstructive pulmonary disease), COPD (chronic obstructive pulmonary disease), Depression, Esophageal reflux, Essential hypertension (07/29/2019), Folic acid deficiency (11/02/2017), Foraminal stenosis of cervical region (05/10/2018), Gastric reflux, Head injury, Hemorrhoids, Herniated disc, cervical (10/21/2014), Hyperlipidemia, Hypertension, Hypertension, Hyponatremia (10/03/2016, 11/9/2015), Limb swelling, Lung disease, Major depressive disorder (07/29/2019), Mediastinal adenopathy  (10/03/2016), Migraine, MVP (mitral valve prolapse) (10/07/2015), JIM (obstructive sleep apnea) (10/03/2016), Osteoarthritis (07/29/2019), Pneumonia, Shortness of breath, Slurred speech (12/01/2016), SOB (shortness of breath), Tremor of right hand (10/07/2015), and Ulnar neuritis (10/18/2018).     Surgical History: has a past surgical history that includes Appendectomy; Other surgical history (Left); Back surgery; Cervical fusion (11/05/2014, 3/11/2015); Cervical disc surgery (1996); Appendectomy; orthopedic surgery (Left); Spine surgery; Joint replacement; Nerve Surgery; and Colonoscopy (N/A, 1/4/2022).     Family History: family history includes Arthritis in his mother; Cancer in his brother; Heart disease in his father, maternal grandfather, and mother; Stroke in his mother.     Social History: reports that he quit smoking about 7 years ago. His smoking use included cigarettes. He started smoking about 57 years ago. He has a 50 pack-year smoking history. He has never used smokeless tobacco. He reports current alcohol use. He reports that he does not use drugs.    Allergies: Penicillins          Current Outpatient Medications:     Acetaminophen (Tylenol) 325 MG capsule, Tylenol, Disp: , Rfl:     albuterol sulfate  (90 Base) MCG/ACT inhaler, Inhale 2 puffs Every 4 (Four) Hours As Needed for Wheezing or Shortness of Air., Disp: 8.5 g, Rfl: 10    amLODIPine (NORVASC) 10 MG tablet, Take 1 tablet by mouth Daily., Disp: 90 tablet, Rfl: 1    atorvastatin (LIPITOR) 10 MG tablet, Take 1 tablet by mouth Daily., Disp: 90 tablet, Rfl: 1    buPROPion XL (WELLBUTRIN XL) 300 MG 24 hr tablet, Take 1 tablet by mouth Daily., Disp: 30 tablet, Rfl: 1    chlorpheniramine (CHLOR-TRIMETON) 4 MG tablet, Take 1 tablet by mouth Every 6 (Six) Hours As Needed for Allergies., Disp: , Rfl:     clonazePAM (KlonoPIN) 1 MG tablet, Take 1 tablet by mouth 2 (Two) Times a Day., Disp: 60 tablet, Rfl: 0    Diclofenac Sodium (VOLTAREN) 1 % gel  gel, Apply  topically to the appropriate area as directed 2 (Two) Times a Day., Disp: 150 g, Rfl: 2    Kloxxado 8 MG/0.1ML liquid, as directed Nasally as needed for 30 days, Disp: , Rfl:     losartan (COZAAR) 50 MG tablet, Take 1 tablet by mouth 2 (Two) Times a Day., Disp: 180 tablet, Rfl: 1    metoprolol succinate XL (TOPROL-XL) 200 MG 24 hr tablet, Take 1 tablet by mouth Daily., Disp: 90 tablet, Rfl: 1    pantoprazole (PROTONIX) 40 MG EC tablet, Take 1 tablet by mouth Daily., Disp: 90 tablet, Rfl: 1    primidone (MYSOLINE) 50 MG tablet, Take 1 tablet by mouth 3 (Three) Times a Day., Disp: 270 tablet, Rfl: 1    Turmeric 500 MG capsule, Take 2 capsules by mouth Daily., Disp: , Rfl:     Breztri Aerosphere 160-9-4.8 MCG/ACT aerosol inhaler, INHALE 2 PUFFS BY MOUTH TWO TIMES A DAY (Patient not taking: Reported on 4/11/2024), Disp: 10.7 g, Rfl: 11    HYDROcodone-acetaminophen (NORCO) 5-325 MG per tablet, Take 1 tablet by mouth Every 6 (Six) Hours As Needed for Moderate Pain. (Patient not taking: Reported on 4/11/2024), Disp: 15 tablet, Rfl: 0    naproxen (NAPROSYN) 500 MG tablet, Take 1 tablet by mouth 2 (Two) Times a Day As Needed for Moderate Pain. (Patient not taking: Reported on 4/11/2024), Disp: 20 tablet, Rfl: 0    nystatin (MYCOSTATIN) 824996 UNIT/GM powder, APPLY TO THE AFFECTED AREA(S) BY TOPICAL ROUTE 3 TIMES PER DAY (Patient not taking: Reported on 4/11/2024), Disp: 60 g, Rfl: 2    Medications Discontinued During This Encounter   Medication Reason    oxyCODONE-acetaminophen (PERCOCET)  MG per tablet *Therapy completed    naloxone (NARCAN) 4 MG/0.1ML nasal spray *Therapy completed    albuterol sulfate  (90 Base) MCG/ACT inhaler Reorder    Diclofenac Sodium (VOLTAREN) 1 % gel gel Reorder    amLODIPine (NORVASC) 10 MG tablet Reorder    buPROPion XL (WELLBUTRIN XL) 300 MG 24 hr tablet Reorder    pantoprazole (PROTONIX) 40 MG EC tablet Reorder    metoprolol succinate XL (TOPROL-XL) 200 MG 24 hr tablet  "Reorder    primidone (MYSOLINE) 50 MG tablet Reorder    atorvastatin (LIPITOR) 10 MG tablet Reorder    losartan (COZAAR) 50 MG tablet Reorder    clonazePAM (KlonoPIN) 1 MG tablet Reorder         Review of Systems   Constitutional:  Negative for fever.   Respiratory:  Negative for cough and shortness of breath.    Cardiovascular:  Negative for chest pain, palpitations and leg swelling.   Neurological:  Negative for dizziness, weakness, numbness and headache.        Objective         Vitals:    04/11/24 0946 04/11/24 1045   BP: 180/90 156/88   BP Location: Right arm Left arm   Patient Position: Sitting Sitting   Cuff Size: Adult Adult   Pulse: 59    Resp: 18    SpO2: 98%    Weight: 86.2 kg (190 lb)    Height: 182.9 cm (72.01\")      Body mass index is 25.76 kg/m².         Physical Exam  Vitals reviewed.   Constitutional:       Appearance: Normal appearance. He is well-developed.   HENT:      Head: Normocephalic and atraumatic.      Mouth/Throat:      Pharynx: No oropharyngeal exudate.   Eyes:      Conjunctiva/sclera: Conjunctivae normal.      Pupils: Pupils are equal, round, and reactive to light.   Cardiovascular:      Rate and Rhythm: Normal rate and regular rhythm.      Heart sounds: Normal heart sounds. No murmur heard.     No friction rub. No gallop.   Pulmonary:      Effort: Pulmonary effort is normal.      Breath sounds: Normal breath sounds. No wheezing or rhonchi.   Skin:     General: Skin is warm and dry.   Neurological:      Mental Status: He is alert and oriented to person, place, and time.   Psychiatric:         Mood and Affect: Mood and affect normal.         Behavior: Behavior normal.         Thought Content: Thought content normal.         Judgment: Judgment normal.             Result Review :               Assessment and Plan     Diagnoses and all orders for this visit:    1. Medication monitoring encounter (Primary)  -     POC Medline 12 Panel Urine Drug Screen    2. Mixed hyperlipidemia  -     " atorvastatin (LIPITOR) 10 MG tablet; Take 1 tablet by mouth Daily.  Dispense: 90 tablet; Refill: 1    3. Anxiety  -     buPROPion XL (WELLBUTRIN XL) 300 MG 24 hr tablet; Take 1 tablet by mouth Daily.  Dispense: 30 tablet; Refill: 1  -     clonazePAM (KlonoPIN) 1 MG tablet; Take 1 tablet by mouth 2 (Two) Times a Day.  Dispense: 60 tablet; Refill: 0    4. Essential hypertension  -     amLODIPine (NORVASC) 10 MG tablet; Take 1 tablet by mouth Daily.  Dispense: 90 tablet; Refill: 1  -     losartan (COZAAR) 50 MG tablet; Take 1 tablet by mouth 2 (Two) Times a Day.  Dispense: 180 tablet; Refill: 1  -     metoprolol succinate XL (TOPROL-XL) 200 MG 24 hr tablet; Take 1 tablet by mouth Daily.  Dispense: 90 tablet; Refill: 1  -     Lipid Panel With / Chol / HDL Ratio; Future  -     Comprehensive Metabolic Panel; Future  -     CBC (No Diff); Future  -     Urinalysis With Culture If Indicated -; Future  -     Lipid Panel With / Chol / HDL Ratio; Future  -     Comprehensive Metabolic Panel; Future  -     Urinalysis With Culture If Indicated -; Future  -     CBC (No Diff); Future    5. Essential hypertension  Comments:  Increase metoprolol to 200mg and watch pressures at home and if drops or gets dizzy will adjust  Orders:  -     amLODIPine (NORVASC) 10 MG tablet; Take 1 tablet by mouth Daily.  Dispense: 90 tablet; Refill: 1  -     losartan (COZAAR) 50 MG tablet; Take 1 tablet by mouth 2 (Two) Times a Day.  Dispense: 180 tablet; Refill: 1  -     metoprolol succinate XL (TOPROL-XL) 200 MG 24 hr tablet; Take 1 tablet by mouth Daily.  Dispense: 90 tablet; Refill: 1  -     Lipid Panel With / Chol / HDL Ratio; Future  -     Comprehensive Metabolic Panel; Future  -     CBC (No Diff); Future  -     Urinalysis With Culture If Indicated -; Future  -     Lipid Panel With / Chol / HDL Ratio; Future  -     Comprehensive Metabolic Panel; Future  -     Urinalysis With Culture If Indicated -; Future  -     CBC (No Diff); Future    6. Tremor  -      primidone (MYSOLINE) 50 MG tablet; Take 1 tablet by mouth 3 (Three) Times a Day.  Dispense: 270 tablet; Refill: 1    7. Gastroesophageal reflux disease without esophagitis  -     pantoprazole (PROTONIX) 40 MG EC tablet; Take 1 tablet by mouth Daily.  Dispense: 90 tablet; Refill: 1    8. Lumbar spondylosis  -     Diclofenac Sodium (VOLTAREN) 1 % gel gel; Apply  topically to the appropriate area as directed 2 (Two) Times a Day.  Dispense: 150 g; Refill: 2    9. Chronic obstructive pulmonary disease, unspecified COPD type  -     albuterol sulfate  (90 Base) MCG/ACT inhaler; Inhale 2 puffs Every 4 (Four) Hours As Needed for Wheezing or Shortness of Air.  Dispense: 8.5 g; Refill: 10    10. JIM (obstructive sleep apnea)  -     Ambulatory Referral to Sleep Medicine    11. Moderate episode of recurrent major depressive disorder              Follow Up     No follow-ups on file.    Patient was given instructions and counseling regarding his condition or for health maintenance advice. Please see specific information pulled into the AVS if appropriate.

## 2024-04-17 ENCOUNTER — TELEPHONE (OUTPATIENT)
Dept: FAMILY MEDICINE CLINIC | Facility: CLINIC | Age: 74
End: 2024-04-17
Payer: MEDICARE

## 2024-04-17 NOTE — TELEPHONE ENCOUNTER
Caller: Jose Rios    Relationship to patient: Self    Best call back number: [phone]     Patient is needing: PATIENT IS REQUESTING A CALL BACK. HE STATED THAT HE HAS BEEN WAITING FOR THE SLEEP STUDY PEOPLE TO CALL HIM. PLEASE CALL AND ADVISE.

## 2024-05-08 DIAGNOSIS — F41.9 ANXIETY: ICD-10-CM

## 2024-05-08 RX ORDER — CLONAZEPAM 1 MG/1
1 TABLET ORAL 2 TIMES DAILY
Qty: 60 TABLET | Refills: 0 | Status: SHIPPED | OUTPATIENT
Start: 2024-05-08

## 2024-05-10 ENCOUNTER — LAB (OUTPATIENT)
Dept: LAB | Facility: HOSPITAL | Age: 74
End: 2024-05-10
Payer: MEDICARE

## 2024-05-10 ENCOUNTER — OFFICE VISIT (OUTPATIENT)
Dept: SLEEP MEDICINE | Facility: HOSPITAL | Age: 74
End: 2024-05-10
Payer: MEDICARE

## 2024-05-10 VITALS
HEART RATE: 46 BPM | OXYGEN SATURATION: 98 % | SYSTOLIC BLOOD PRESSURE: 132 MMHG | HEIGHT: 72 IN | DIASTOLIC BLOOD PRESSURE: 68 MMHG | BODY MASS INDEX: 26.22 KG/M2 | WEIGHT: 193.6 LBS

## 2024-05-10 DIAGNOSIS — I10 ESSENTIAL HYPERTENSION: ICD-10-CM

## 2024-05-10 DIAGNOSIS — G47.33 OSA (OBSTRUCTIVE SLEEP APNEA): ICD-10-CM

## 2024-05-10 DIAGNOSIS — I34.1 MVP (MITRAL VALVE PROLAPSE): ICD-10-CM

## 2024-05-10 DIAGNOSIS — J44.1 CHRONIC OBSTRUCTIVE PULMONARY DISEASE WITH ACUTE EXACERBATION: Primary | ICD-10-CM

## 2024-05-10 DIAGNOSIS — E66.3 OVERWEIGHT: ICD-10-CM

## 2024-05-10 PROCEDURE — 85027 COMPLETE CBC AUTOMATED: CPT

## 2024-05-10 PROCEDURE — 80053 COMPREHEN METABOLIC PANEL: CPT

## 2024-05-10 PROCEDURE — 80061 LIPID PANEL: CPT

## 2024-05-10 PROCEDURE — G0463 HOSPITAL OUTPT CLINIC VISIT: HCPCS

## 2024-05-10 PROCEDURE — 36415 COLL VENOUS BLD VENIPUNCTURE: CPT

## 2024-05-10 PROCEDURE — 81003 URINALYSIS AUTO W/O SCOPE: CPT

## 2024-05-10 RX ORDER — OXYCODONE AND ACETAMINOPHEN 10; 325 MG/1; MG/1
1 TABLET ORAL
COMMUNITY
Start: 2024-05-08

## 2024-05-11 LAB
ALBUMIN SERPL-MCNC: 4.3 G/DL (ref 3.5–5.2)
ALBUMIN/GLOB SERPL: 1.3 G/DL
ALP SERPL-CCNC: 87 U/L (ref 39–117)
ALT SERPL W P-5'-P-CCNC: 21 U/L (ref 1–41)
ANION GAP SERPL CALCULATED.3IONS-SCNC: 12.8 MMOL/L (ref 5–15)
AST SERPL-CCNC: 29 U/L (ref 1–40)
BILIRUB SERPL-MCNC: 0.3 MG/DL (ref 0–1.2)
BILIRUB UR QL STRIP: NEGATIVE
BUN SERPL-MCNC: 18 MG/DL (ref 8–23)
BUN/CREAT SERPL: 14 (ref 7–25)
CALCIUM SPEC-SCNC: 9.4 MG/DL (ref 8.6–10.5)
CHLORIDE SERPL-SCNC: 98 MMOL/L (ref 98–107)
CHOLEST SERPL-MCNC: 159 MG/DL (ref 0–200)
CLARITY UR: CLEAR
CO2 SERPL-SCNC: 23.2 MMOL/L (ref 22–29)
COLOR UR: YELLOW
CREAT SERPL-MCNC: 1.29 MG/DL (ref 0.76–1.27)
DEPRECATED RDW RBC AUTO: 44.7 FL (ref 37–54)
EGFRCR SERPLBLD CKD-EPI 2021: 59.3 ML/MIN/1.73
ERYTHROCYTE [DISTWIDTH] IN BLOOD BY AUTOMATED COUNT: 13.1 % (ref 12.3–15.4)
GLOBULIN UR ELPH-MCNC: 3.4 GM/DL
GLUCOSE SERPL-MCNC: 89 MG/DL (ref 65–99)
GLUCOSE UR STRIP-MCNC: NEGATIVE MG/DL
HCT VFR BLD AUTO: 39.5 % (ref 37.5–51)
HDLC SERPL QL: 3.38
HDLC SERPL-MCNC: 47 MG/DL (ref 40–60)
HGB BLD-MCNC: 13 G/DL (ref 13–17.7)
HGB UR QL STRIP.AUTO: NEGATIVE
HOLD SPECIMEN: NORMAL
KETONES UR QL STRIP: NEGATIVE
LDLC SERPL CALC-MCNC: 94 MG/DL (ref 0–100)
LEUKOCYTE ESTERASE UR QL STRIP.AUTO: NEGATIVE
MCH RBC QN AUTO: 30.7 PG (ref 26.6–33)
MCHC RBC AUTO-ENTMCNC: 32.9 G/DL (ref 31.5–35.7)
MCV RBC AUTO: 93.2 FL (ref 79–97)
NITRITE UR QL STRIP: NEGATIVE
PH UR STRIP.AUTO: 6 [PH] (ref 5–8)
PLATELET # BLD AUTO: 295 10*3/MM3 (ref 140–450)
PMV BLD AUTO: 9.2 FL (ref 6–12)
POTASSIUM SERPL-SCNC: 4.5 MMOL/L (ref 3.5–5.2)
PROT SERPL-MCNC: 7.7 G/DL (ref 6–8.5)
PROT UR QL STRIP: ABNORMAL
RBC # BLD AUTO: 4.24 10*6/MM3 (ref 4.14–5.8)
SODIUM SERPL-SCNC: 134 MMOL/L (ref 136–145)
SP GR UR STRIP: 1.02 (ref 1–1.03)
TRIGL SERPL-MCNC: 99 MG/DL (ref 0–150)
UROBILINOGEN UR QL STRIP: ABNORMAL
VLDLC SERPL-MCNC: 18 MG/DL (ref 5–40)
WBC NRBC COR # BLD AUTO: 6.32 10*3/MM3 (ref 3.4–10.8)

## 2024-06-03 DIAGNOSIS — F41.9 ANXIETY: ICD-10-CM

## 2024-06-03 RX ORDER — CLONAZEPAM 1 MG/1
1 TABLET ORAL 2 TIMES DAILY
Qty: 60 TABLET | Refills: 0 | Status: SHIPPED | OUTPATIENT
Start: 2024-06-03

## 2024-06-03 NOTE — TELEPHONE ENCOUNTER
UPCOMING APPTS  With Family Medicine (CHRIS Toussaint)  10/11/2024 at 10:45 AM  LAST OFFICE VISIT - THIS DEPT  4/11/2024 Jagdeep Bonner APRN    [facility] 5/10/24

## 2024-06-12 DIAGNOSIS — F41.9 ANXIETY: ICD-10-CM

## 2024-06-12 RX ORDER — BUPROPION HYDROCHLORIDE 300 MG/1
300 TABLET ORAL DAILY
Qty: 30 TABLET | Refills: 1 | Status: SHIPPED | OUTPATIENT
Start: 2024-06-12

## 2024-07-02 DIAGNOSIS — F41.9 ANXIETY: ICD-10-CM

## 2024-07-03 RX ORDER — CLONAZEPAM 1 MG/1
1 TABLET ORAL 2 TIMES DAILY
Qty: 60 TABLET | Refills: 0 | Status: SHIPPED | OUTPATIENT
Start: 2024-07-03

## 2024-07-22 DIAGNOSIS — F41.9 ANXIETY: ICD-10-CM

## 2024-07-22 RX ORDER — CLONAZEPAM 1 MG/1
1 TABLET ORAL 2 TIMES DAILY
Qty: 60 TABLET | Refills: 0 | Status: SHIPPED | OUTPATIENT
Start: 2024-07-22

## 2024-08-13 DIAGNOSIS — F41.9 ANXIETY: ICD-10-CM

## 2024-08-13 RX ORDER — BUPROPION HYDROCHLORIDE 300 MG/1
300 TABLET ORAL DAILY
Qty: 30 TABLET | Refills: 1 | Status: SHIPPED | OUTPATIENT
Start: 2024-08-13

## 2024-08-19 DIAGNOSIS — F41.9 ANXIETY: ICD-10-CM

## 2024-08-19 RX ORDER — CLONAZEPAM 1 MG/1
1 TABLET ORAL 2 TIMES DAILY
Qty: 60 TABLET | Refills: 0 | Status: SHIPPED | OUTPATIENT
Start: 2024-08-19

## 2024-08-19 NOTE — TELEPHONE ENCOUNTER
UPCOMING APPTS  With Family Medicine (CHRIS Toussaint)  10/11/2024 at 10:45 AM  LAST OFFICE VISIT - THIS DEPT  4/11/2024 Jagdeep Bonner APRN      [facility] 4/11/2024

## 2024-09-23 DIAGNOSIS — F41.9 ANXIETY: ICD-10-CM

## 2024-09-24 RX ORDER — CLONAZEPAM 1 MG/1
1 TABLET ORAL 2 TIMES DAILY
Qty: 60 TABLET | Refills: 0 | Status: SHIPPED | OUTPATIENT
Start: 2024-09-24

## 2024-09-26 DIAGNOSIS — Z12.2 SCREENING FOR LUNG CANCER: ICD-10-CM

## 2024-09-26 DIAGNOSIS — Z87.891 FORMER SMOKER: Primary | ICD-10-CM

## 2024-10-11 ENCOUNTER — OFFICE VISIT (OUTPATIENT)
Dept: FAMILY MEDICINE CLINIC | Facility: CLINIC | Age: 74
End: 2024-10-11
Payer: MEDICARE

## 2024-10-11 VITALS
HEART RATE: 56 BPM | HEIGHT: 72 IN | OXYGEN SATURATION: 97 % | WEIGHT: 188 LBS | SYSTOLIC BLOOD PRESSURE: 142 MMHG | DIASTOLIC BLOOD PRESSURE: 70 MMHG | BODY MASS INDEX: 25.47 KG/M2 | TEMPERATURE: 96.8 F | RESPIRATION RATE: 16 BRPM

## 2024-10-11 DIAGNOSIS — R63.4 UNINTENTIONAL WEIGHT LOSS OF MORE THAN 10 POUNDS IN 90 DAYS: ICD-10-CM

## 2024-10-11 DIAGNOSIS — Z23 NEED FOR IMMUNIZATION AGAINST INFLUENZA: Primary | ICD-10-CM

## 2024-10-11 DIAGNOSIS — E55.9 VITAMIN D DEFICIENCY: ICD-10-CM

## 2024-10-11 DIAGNOSIS — R26.81 UNSTEADY GAIT: ICD-10-CM

## 2024-10-11 DIAGNOSIS — E78.2 MIXED HYPERLIPIDEMIA: ICD-10-CM

## 2024-10-11 DIAGNOSIS — I10 ESSENTIAL HYPERTENSION: ICD-10-CM

## 2024-10-11 NOTE — PROGRESS NOTES
Subjective   The ABCs of the Annual Wellness Visit  Medicare Wellness Visit      Jose Rios is a 73 y.o. patient who presents for a Medicare Wellness Visit.    The following portions of the patient's history were reviewed and   updated as appropriate: allergies, current medications, past family history, past medical history, past social history, past surgical history, and problem list.    Compared to one year ago, the patient's physical   health is the same.  Compared to one year ago, the patient's mental   health is the same.    Recent Hospitalizations:  He was not admitted to the hospital during the last year.     Current Medical Providers:  Patient Care Team:  Jagdeep Bonner APRN as PCP - General (Nurse Practitioner)  Rufino Weir DO as Consulting Physician (Pulmonary Disease)  Yamil Colin MD as Consulting Physician (Neurology)  Tayla Edward PA as Physician Assistant (Neurosurgery)  González Zapata MD as Surgeon (Orthopedic Surgery)  Gina Adams (Pain Medicine)  Pastor, April, APRN as Nurse Practitioner (Nurse Practitioner)    Outpatient Medications Prior to Visit   Medication Sig Dispense Refill    albuterol sulfate  (90 Base) MCG/ACT inhaler Inhale 2 puffs Every 4 (Four) Hours As Needed for Wheezing or Shortness of Air. 8.5 g 10    amLODIPine (NORVASC) 10 MG tablet Take 1 tablet by mouth Daily. 90 tablet 1    atorvastatin (LIPITOR) 10 MG tablet Take 1 tablet by mouth Daily. 90 tablet 1    buPROPion XL (WELLBUTRIN XL) 300 MG 24 hr tablet TAKE ONE TABLET BY MOUTH EVERY DAY 30 tablet 1    clonazePAM (KlonoPIN) 1 MG tablet TAKE ONE TABLET BY MOUTH TWO TIMES A DAY 60 tablet 0    Diclofenac Sodium (VOLTAREN) 1 % gel gel Apply  topically to the appropriate area as directed 2 (Two) Times a Day. 150 g 2    losartan (COZAAR) 50 MG tablet Take 1 tablet by mouth 2 (Two) Times a Day. 180 tablet 1    metoprolol succinate XL (TOPROL-XL) 200 MG 24 hr tablet Take 1 tablet by  mouth Daily. 90 tablet 1    oxyCODONE-acetaminophen (PERCOCET)  MG per tablet Take 1 tablet by mouth.      pantoprazole (PROTONIX) 40 MG EC tablet Take 1 tablet by mouth Daily. 90 tablet 1    primidone (MYSOLINE) 50 MG tablet Take 1 tablet by mouth 3 (Three) Times a Day. 270 tablet 1    Turmeric 500 MG capsule Take 2 capsules by mouth Daily.      Acetaminophen (Tylenol) 325 MG capsule Tylenol (Patient not taking: Reported on 5/10/2024)      Breztri Aerosphere 160-9-4.8 MCG/ACT aerosol inhaler INHALE 2 PUFFS BY MOUTH TWO TIMES A DAY (Patient not taking: Reported on 4/11/2024) 10.7 g 11    chlorpheniramine (CHLOR-TRIMETON) 4 MG tablet Take 1 tablet by mouth Every 6 (Six) Hours As Needed for Allergies. (Patient not taking: Reported on 5/10/2024)      HYDROcodone-acetaminophen (NORCO) 5-325 MG per tablet Take 1 tablet by mouth Every 6 (Six) Hours As Needed for Moderate Pain. (Patient not taking: Reported on 4/11/2024) 15 tablet 0    Kloxxado 8 MG/0.1ML liquid as directed Nasally as needed for 30 days (Patient not taking: Reported on 5/10/2024)      naproxen (NAPROSYN) 500 MG tablet Take 1 tablet by mouth 2 (Two) Times a Day As Needed for Moderate Pain. (Patient not taking: Reported on 4/11/2024) 20 tablet 0    nystatin (MYCOSTATIN) 198932 UNIT/GM powder APPLY TO THE AFFECTED AREA(S) BY TOPICAL ROUTE 3 TIMES PER DAY (Patient not taking: Reported on 4/11/2024) 60 g 2     No facility-administered medications prior to visit.     Opioid medication/s are on active medication list.  and I have evaluated his active treatment plan and pain score trends (see table).  Vitals:    10/11/24 1105   PainSc:   4     I have reviewed the chart for potential of high risk medication and harmful drug interactions in the elderly.        Aspirin is not on active medication list.  Aspirin use is not indicated based on review of current medical condition/s. Risk of harm outweighs potential benefits.  .    Patient Active Problem List  "  Diagnosis    Anemia    Anxiety disorder    Chronic obstructive pulmonary disease    CKD (chronic kidney disease) stage 3, GFR 30-59 ml/min    Essential hypertension    Hyperlipidemia    Hyponatremia    Major depressive disorder    MVP (mitral valve prolapse)    JIM (obstructive sleep apnea)    Osteoarthritis    Depression    Centrilobular emphysema    DDD (degenerative disc disease), cervical    Spondylosis without myelopathy    Tremor    Vitamin D deficiency    Tobacco abuse, in remission    Chronic dyspnea    Lumbar spondylosis    Overweight     Advance Care Planning Advance Directive is not on file.  ACP discussion was held with the patient during this visit. Patient does not have an advance directive, declines further assistance.            Objective   Vitals:    10/11/24 1105   BP: 142/70   Pulse: 56   Resp: 16   Temp: 96.8 °F (36 °C)   SpO2: 97%   Weight: 85.3 kg (188 lb)   Height: 182.9 cm (72\")   PainSc:   4       Estimated body mass index is 25.5 kg/m² as calculated from the following:    Height as of this encounter: 182.9 cm (72\").    Weight as of this encounter: 85.3 kg (188 lb).    BMI is >= 25 and <30. (Overweight) The following options were offered after discussion;: weight loss educational material (shared in after visit summary)       Does the patient have evidence of cognitive impairment? No                                                                                                Health  Risk Assessment    Smoking Status:  Social History     Tobacco Use   Smoking Status Former    Current packs/day: 0.00    Average packs/day: 1 pack/day for 50.0 years (50.0 ttl pk-yrs)    Types: Cigarettes    Start date: 1966    Quit date: 2016    Years since quittin.2   Smokeless Tobacco Never     Alcohol Consumption:  Social History     Substance and Sexual Activity   Alcohol Use Yes    Comment: 1 or 2 a [facility]        Fall Risk Screen  STEADI Fall Risk Assessment was completed, and patient is at " MODERATE risk for falls. Assessment completed on:10/11/2024    Depression Screening:      10/11/2024    11:00 AM   PHQ-2/PHQ-9 Depression Screening   Little Interest or Pleasure in Doing Things 0-->not at all   Feeling Down, Depressed or Hopeless 0-->not at all   PHQ-9: Brief Depression Severity Measure Score 0     Health Habits and Functional and Cognitive Screening:      10/11/2024    11:00 AM   Functional & Cognitive Status   Do you have difficulty preparing food and eating? No   Do you have difficulty bathing yourself, getting dressed or grooming yourself? No   Do you have difficulty using the toilet? No   Do you have difficulty moving around from place to place? Yes   Do you have trouble with steps or getting out of a bed or a chair? Yes   Current Diet Other        Current Diet Comment does not have an appetite   Dental Exam Other        Dental Exam Comment has dentures   Eye Exam Not up to date   Exercise (times per week) 0 times per week   Current Exercises Include No Regular Exercise   Do you need help using the phone?  No   Are you deaf or do you have serious difficulty hearing?  Yes   Do you need help to go to places out of walking distance? Yes   Do you need help shopping? Yes   Do you need help preparing meals?  No   Do you need help with housework?  No   Do you need help with laundry? No   Do you need help taking your medications? No   Do you need help managing money? No   Do you ever drive or ride in a car without wearing a seat belt? No   Have you felt unusual stress, anger or loneliness in the last month? No   Who do you live with? Spouse   If you need help, do you have trouble finding someone available to you? No   Have you been bothered in the last four weeks by sexual problems? No   Do you have difficulty concentrating, remembering or making decisions? No           Age-appropriate Screening Schedule:  Refer to the list below for future screening recommendations based on patient's age, sex and/or  "medical conditions. Orders for these recommended tests are listed in the plan section. The patient has been provided with a written plan.    Health Maintenance List  Health Maintenance   Topic Date Due    Pneumococcal Vaccine 65+ (3 of 3 - PPSV23 or PCV20) 07/29/2020    BMI FOLLOWUP  07/17/2024    LUNG CANCER SCREENING  08/11/2024    COVID-19 Vaccine (5 - 2023-24 season) 09/01/2024    LIPID PANEL  05/10/2025    ANNUAL WELLNESS VISIT  10/11/2025    TDAP/TD VACCINES (2 - Td or Tdap) 06/07/2027    COLORECTAL CANCER SCREENING  01/04/2032    HEPATITIS C SCREENING  Completed    INFLUENZA VACCINE  Completed    AAA SCREEN (ONE-TIME)  Completed    ZOSTER VACCINE  Completed                                                                                                                                                CMS Preventative Services Quick Reference  Risk Factors Identified During Encounter  Fall Risk-High or Moderate: Discussed Fall Prevention in the home    The above risks/problems have been discussed with the patient.  Pertinent information has been shared with the patient in the After Visit Summary.  An After Visit Summary and PPPS were made available to the patient.    Follow Up:   Next Medicare Wellness visit to be scheduled in 1 year.         Additional E&M Note during same encounter follows:  Patient has additional, significant, and separately identifiable condition(s)/problem(s) that require work above and beyond the Medicare Wellness Visit     Chief Complaint  Medicare Wellness-subsequent, Hypertension, and Anxiety    Subjective   Having more balance issues gets unsteady and continues with neuropathy.  States that ran into door when got up at night a week ago and has a bruies to left side of face and above eyebrow with a scabbed.    Gums \" are driving me crazy\"  has full dentures but has top broken.  So most of the time is top that is irritating but bottom gums are irritated only when dentures are " "out.    Anxiety:  is about the same.    Has lost 35 pounds and not eating due to not wanting to eat at times.  States just not eating.  Denies blood in stool or vomit or abdominal pain or other issues besides not eating sometimes due to gums.  States has no appetite.  Denies headaches.    Hypertension  Associated symptoms include anxiety. Pertinent negatives include no chest pain, headaches or shortness of breath.   Anxiety   Patient reports no chest pain or shortness of breath.         Review of Systems   Constitutional:  Negative for fever.   Respiratory:  Negative for cough, chest tightness and shortness of breath.    Cardiovascular:  Negative for chest pain.              Objective   Vital Signs:  /70   Pulse 56   Temp 96.8 °F (36 °C)   Resp 16   Ht 182.9 cm (72\")   Wt 85.3 kg (188 lb)   SpO2 97%   BMI 25.50 kg/m²   Physical Exam  Vitals reviewed.   Constitutional:       Appearance: Normal appearance. He is well-developed.   HENT:      Head: Normocephalic and atraumatic.      Mouth/Throat:      Pharynx: No oropharyngeal exudate.   Eyes:      Conjunctiva/sclera: Conjunctivae normal.      Pupils: Pupils are equal, round, and reactive to light.   Cardiovascular:      Rate and Rhythm: Normal rate and regular rhythm.      Heart sounds: Normal heart sounds. No murmur heard.     No friction rub. No gallop.   Pulmonary:      Effort: Pulmonary effort is normal.      Breath sounds: Normal breath sounds. No wheezing or rhonchi.   Skin:     General: Skin is warm and dry.   Neurological:      Mental Status: He is alert and oriented to person, place, and time.   Psychiatric:         Mood and Affect: Mood and affect normal.         Behavior: Behavior normal.         Thought Content: Thought content normal.         Judgment: Judgment normal.                 Assessment and Plan               Need for immunization against influenza    Unsteady gait    Unintentional weight loss of more than 10 pounds in 90 " days    Vitamin D deficiency    Essential hypertension    Mixed hyperlipidemia       Orders Placed This Encounter   Procedures    CT Abdomen Pelvis With & Without Contrast     Standing Status:   Future     Standing Expiration Date:   10/11/2025     Order Specific Question:   Will Oral Contrast be needed for this procedure?     Answer:   Yes     Order Specific Question:   Release to patient     Answer:   Routine Release [[id number]]     Order Specific Question:   Reason for Exam:     Answer:   unintentional weight loss    Fluzone High-Dose 65+yrs (5677-5764)    Lipid Panel With / Chol / HDL Ratio     Standing Status:   Future     Standing Expiration Date:   10/11/2025     Order Specific Question:   Release to patient     Answer:   Routine Release [[id number]]    Comprehensive Metabolic Panel     Standing Status:   Future     Standing Expiration Date:   10/11/2025     Order Specific Question:   Release to patient     Answer:   Routine Release [[id number]]    CBC (No Diff)     Standing Status:   Future     Standing Expiration Date:   10/11/2025     Order Specific Question:   Release to patient     Answer:   Routine Release [[id number]]    Urinalysis With Culture If Indicated -     Standing Status:   Future     Standing Expiration Date:   10/11/2025     Order Specific Question:   Release to patient     Answer:   Routine Release [[id number]]    Vitamin D,25-Hydroxy     Standing Status:   Future     Standing Expiration Date:   10/11/2025     Order Specific Question:   Release to patient     Answer:   Routine Release [[id number]]    Vitamin D,25-Hydroxy     Standing Status:   Future     Standing Expiration Date:   10/11/2025     Order Specific Question:   Release to patient     Answer:   Routine Release [[id number]]    Ambulatory Referral to Neurology     Referral Priority:   Routine     Referral Type:   Consultation     Referral Reason:   Specialty Services Required     Referred to Provider:   Yamil Colin MD      Requested Specialty:   Neurology     Number of Visits Requested:   1      Has CT of chest next week.       Follow Up   Return in about 6 months (around 4/11/2025).  Patient was given instructions and counseling regarding his condition or for health maintenance advice. Please see specific information pulled into the AVS if appropriate.

## 2024-10-21 DIAGNOSIS — F41.9 ANXIETY: ICD-10-CM

## 2024-10-21 RX ORDER — CLONAZEPAM 1 MG/1
1 TABLET ORAL 2 TIMES DAILY
Qty: 60 TABLET | Refills: 0 | Status: SHIPPED | OUTPATIENT
Start: 2024-10-21

## 2024-10-21 NOTE — TELEPHONE ENCOUNTER
UPCOMING APPTS  With Family Medicine (CHRIS Toussaint)  04/07/2025 at 10:45 AM  LAST OFFICE VISIT - THIS DEPT  10/11/2024 Jagdeep Bonner APRN    UDS4/11/24

## 2024-10-28 ENCOUNTER — OFFICE VISIT (OUTPATIENT)
Dept: SLEEP MEDICINE | Facility: HOSPITAL | Age: 74
End: 2024-10-28
Payer: MEDICARE

## 2024-10-28 VITALS — BODY MASS INDEX: 25.57 KG/M2 | HEART RATE: 54 BPM | OXYGEN SATURATION: 97 % | WEIGHT: 188.8 LBS | HEIGHT: 72 IN

## 2024-10-28 DIAGNOSIS — E66.3 OVERWEIGHT: ICD-10-CM

## 2024-10-28 DIAGNOSIS — G47.33 OSA (OBSTRUCTIVE SLEEP APNEA): Primary | ICD-10-CM

## 2024-10-28 PROCEDURE — 99213 OFFICE O/P EST LOW 20 MIN: CPT | Performed by: NURSE PRACTITIONER

## 2024-10-28 PROCEDURE — G0463 HOSPITAL OUTPT CLINIC VISIT: HCPCS

## 2024-10-28 PROCEDURE — 1159F MED LIST DOCD IN RCRD: CPT | Performed by: NURSE PRACTITIONER

## 2024-10-28 PROCEDURE — 1160F RVW MEDS BY RX/DR IN RCRD: CPT | Performed by: NURSE PRACTITIONER

## 2024-10-28 NOTE — PROGRESS NOTES
[address] [address]  [place]   [place] [address]  Phone: [phone]  Fax: [phone]        SLEEP CLINIC FOLLOW-UP PROGRESS NOTE    Jose Rios  [id number]   1950  73 y.o.  male      PCP: Jagdeep Bonner APRN    DATE OF VISIT: 10/28/2024          CHIEF COMPLAINT: Obstructive sleep apnea    HPI:  This is a 73 y.o. year old patient who presents to the clinic today for the management of obstructive sleep apnea.  Patient was seen in the office 5/2024 by Dr. Mccoy.  It was noted that patient had a sleep study in 2017 showing RDI of 57.5/h it was titrated to CPAP at 10 cm H2O.  When he was seen in the office 5/2024 he was needing a new device and was ordered auto CPAP at 5 to 15 cm H2O.  He now presents today for initial compliance visit with this device.  Patient's sleep apnea has improved with this therapy with residual AHI 1.8/hr.   Average usage is 8 hours 43 minutes per night on nights used.   Patient tolerating device well and improved with treatment.  Is having some mask leak.  Using full face mask, not sure of style, but not vitera.  Was given sample for F&P vitera size medium.  If this does not work then he will reach out to [employer] for mask fit.           MEDICATIONS: reviewed     ALLERGIES:  Penicillins    SOCIAL HISTORY (habits pertaining to sleep medicine):  Tobacco use: No   Alcohol use: 0 per week  Caffeine use: 1     REVIEW OF SYSTEMS:   Pertinent positive symptoms are:  [place] Sleepiness Scale :Total score: 9   Anxiety and depression: Reports he follows with outside provider for this.  Denies SI or HI  History of dyspnea and wheezing: Reports he has history of COPD.  Not having symptoms in the office today.  Reports he follows with outside provider for his COPD.  Acid reflux: Reports he follows with outside provider with this, on medication        PHYSICAL EXAMINATION:  CONSTITUTIONAL:  Vitals:    10/28/24 1300   Pulse: 54   SpO2: 97%   Weight: 85.6 kg  "(188 lb 12.8 oz)   Height: 182.9 cm (72.01\")    Body mass index is 25.6 kg/m².   HEAD: atraumatic, normocephalic  RESP SYSTEM: not in respiratory distress, breathing unlabored  CARDIOVASULAR: normal rate, no edema noted   NEURO: Alert and oriented x 3, mood and affect appeared appropriate      DATA REVIEWED:  The PAP compliance summary downloaded on 10/22/2024 has been reviewed independently by me and discussed with the patient.   Compliance: 100%  More than 4 hr use: 87%  Average use of the device: 8 hours 43 minutes per night  Residual AHI: 1.8/hr (goal < 5.0 /hr)  Device: ResMed air sense 11  Mask type: Fullface  DME: [employer]          ASSESSMENT AND PLAN:  Obstructive Sleep Apnea: sleep apnea has improved with the device and treatment.  Patient has excellent compliance with the device for treatment of sleep apnea.  I have personally reviewed the smart card download and discussed the download data with the patient and encouarged continued use of the device.  The residual AHI is acceptable. The device is benefiting the patient and the device is medically necessary.  Recommend patient get supplies from the DME company, change them on a regular basis, and clean as directed.  A prescription for supplies has been sent to the DME company.  Recommend continued usage of PAP device, most insurances require minimum usage of 4 hours per night at least 70% of the time, would recommend using all night every night if possible for optimum health.  Recommend prioritizing sleep to aid in overall health.  Do not drive or operate heavy machinery or do activities that require high concentration if feeling tired or drowsy.  Mildly elevated BMI: Body mass index is 25.6 kg/m².. Patients with elevated body weight are at increased risk of sleep apnea/ sleep disordered breathing.  Maintaining healthy weight and healthy lifestyle are encouraged in overweight/ obese patients as part of a comprehensive approach to sleep apnea treatment.  "       Patient will follow-up in 3 months or follow-up sooner for any issues or concerns.  Patient's questions were answered.          Thank you for allowing me to participate in the care of this patient.     Shelbie Gonzales DNP, CHRIS  [facility] Sleep Medicine

## 2024-10-29 ENCOUNTER — HOSPITAL ENCOUNTER (OUTPATIENT)
Dept: CT IMAGING | Facility: HOSPITAL | Age: 74
Discharge: HOME | End: 2024-10-29
Admitting: NURSE PRACTITIONER
Payer: MEDICARE

## 2024-10-29 DIAGNOSIS — R63.4 UNINTENTIONAL WEIGHT LOSS OF MORE THAN 10 POUNDS IN 90 DAYS: ICD-10-CM

## 2024-10-29 LAB
CREAT BLDA-MCNC: 1.1 MG/DL (ref 0.6–1.3)
EGFRCR SERPLBLD CKD-EPI 2021: 71.8 ML/MIN/1.73

## 2024-10-29 PROCEDURE — 82565 ASSAY OF CREATININE: CPT

## 2024-10-29 PROCEDURE — 25510000001 IOPAMIDOL PER 1 ML: Performed by: NURSE PRACTITIONER

## 2024-10-29 PROCEDURE — 74178 CT ABD&PLV WO CNTR FLWD CNTR: CPT

## 2024-10-29 RX ORDER — IOPAMIDOL 755 MG/ML
100 INJECTION, SOLUTION INTRAVASCULAR
Status: COMPLETED | OUTPATIENT
Start: 2024-10-29 | End: 2024-10-29

## 2024-10-29 RX ADMIN — IOPAMIDOL 100 ML: 755 INJECTION, SOLUTION INTRAVENOUS at 12:59

## 2024-11-12 DIAGNOSIS — F41.9 ANXIETY: ICD-10-CM

## 2024-11-12 NOTE — TELEPHONE ENCOUNTER
UPCOMING APPTS  With Family Medicine (CHRIS Toussaint)  04/07/2025 at 10:45 AM  LAST OFFICE VISIT - THIS DEPT  10/11/2024 Jagdeep Bonner APRN    Uds 4/11/24

## 2024-11-13 RX ORDER — CLONAZEPAM 1 MG/1
1 TABLET ORAL 2 TIMES DAILY
Qty: 60 TABLET | Refills: 0 | Status: SHIPPED | OUTPATIENT
Start: 2024-11-13

## 2024-11-13 RX ORDER — BUPROPION HYDROCHLORIDE 300 MG/1
300 TABLET ORAL DAILY
Qty: 30 TABLET | Refills: 1 | Status: SHIPPED | OUTPATIENT
Start: 2024-11-13

## 2024-12-23 DIAGNOSIS — F41.9 ANXIETY: ICD-10-CM

## 2024-12-23 DIAGNOSIS — K21.9 GASTROESOPHAGEAL REFLUX DISEASE WITHOUT ESOPHAGITIS: ICD-10-CM

## 2024-12-23 RX ORDER — CLONAZEPAM 1 MG/1
1 TABLET ORAL 2 TIMES DAILY
Qty: 60 TABLET | Refills: 0 | Status: SHIPPED | OUTPATIENT
Start: 2024-12-23

## 2024-12-23 RX ORDER — PANTOPRAZOLE SODIUM 40 MG/1
40 TABLET, DELAYED RELEASE ORAL DAILY
Qty: 90 TABLET | Refills: 1 | Status: SHIPPED | OUTPATIENT
Start: 2024-12-23

## 2025-01-20 DIAGNOSIS — F41.9 ANXIETY: ICD-10-CM

## 2025-01-20 NOTE — TELEPHONE ENCOUNTER
UPCOMING APPTS  With Family Medicine (HCR[facility] Toussaint)  04/07/2025 at 10:45 AM  LAST OFFICE VISIT - THIS DEPT  10/11/2024 Jagdeep Bonner APRN    Uds4/11/2024

## 2025-01-21 DIAGNOSIS — I10 ESSENTIAL HYPERTENSION: ICD-10-CM

## 2025-01-21 DIAGNOSIS — F41.9 ANXIETY: ICD-10-CM

## 2025-01-21 RX ORDER — AMLODIPINE BESYLATE 10 MG/1
10 TABLET ORAL DAILY
Qty: 90 TABLET | Refills: 1 | Status: SHIPPED | OUTPATIENT
Start: 2025-01-21

## 2025-01-21 RX ORDER — BUPROPION HYDROCHLORIDE 300 MG/1
300 TABLET ORAL DAILY
Qty: 30 TABLET | Refills: 1 | Status: SHIPPED | OUTPATIENT
Start: 2025-01-21

## 2025-01-21 RX ORDER — METOPROLOL SUCCINATE 200 MG/1
200 TABLET, EXTENDED RELEASE ORAL DAILY
Qty: 90 TABLET | Refills: 1 | Status: SHIPPED | OUTPATIENT
Start: 2025-01-21

## 2025-01-22 RX ORDER — CLONAZEPAM 1 MG/1
1 TABLET ORAL 2 TIMES DAILY
Qty: 60 TABLET | Refills: 0 | Status: SHIPPED | OUTPATIENT
Start: 2025-01-22

## 2025-02-18 DIAGNOSIS — F41.9 ANXIETY: ICD-10-CM

## 2025-02-19 RX ORDER — CLONAZEPAM 1 MG/1
1 TABLET ORAL 2 TIMES DAILY
Qty: 60 TABLET | Refills: 0 | Status: SHIPPED | OUTPATIENT
Start: 2025-02-19

## 2025-03-20 DIAGNOSIS — F41.9 ANXIETY: ICD-10-CM

## 2025-03-20 RX ORDER — CLONAZEPAM 1 MG/1
1 TABLET ORAL 2 TIMES DAILY
Qty: 60 TABLET | Refills: 0 | Status: SHIPPED | OUTPATIENT
Start: 2025-03-20

## 2025-03-20 NOTE — TELEPHONE ENCOUNTER
Upcoming Appts  With Family Medicine (CHRIS Toussaint)  04/07/2025 at 10:45 AM  Last Office Visit - This Dept  10/11/2024 Jagdeep Bonner APRN    Uds 4/11/2024

## 2025-03-21 DIAGNOSIS — F41.9 ANXIETY: ICD-10-CM

## 2025-03-21 DIAGNOSIS — E78.2 MIXED HYPERLIPIDEMIA: ICD-10-CM

## 2025-03-21 RX ORDER — BUPROPION HYDROCHLORIDE 300 MG/1
300 TABLET ORAL DAILY
Qty: 30 TABLET | Refills: 1 | Status: SHIPPED | OUTPATIENT
Start: 2025-03-21

## 2025-03-21 RX ORDER — ATORVASTATIN CALCIUM 10 MG/1
10 TABLET, FILM COATED ORAL DAILY
Qty: 90 TABLET | Refills: 1 | Status: SHIPPED | OUTPATIENT
Start: 2025-03-21

## 2025-04-07 ENCOUNTER — TELEPHONE (OUTPATIENT)
Dept: FAMILY MEDICINE CLINIC | Facility: CLINIC | Age: 75
End: 2025-04-07

## 2025-04-07 NOTE — TELEPHONE ENCOUNTER
Relay  [facility]  Patient missed their appointment scheduled on 4/7/25 with Jagdeep Bonner.  Would patient like to be rescheduled?  No show letter sent to patient either via mychart or mail.

## 2025-04-14 DIAGNOSIS — F41.9 ANXIETY: ICD-10-CM

## 2025-04-14 RX ORDER — CLONAZEPAM 1 MG/1
1 TABLET ORAL 2 TIMES DAILY
Qty: 60 TABLET | Refills: 1 | OUTPATIENT
Start: 2025-04-14

## 2025-04-14 NOTE — TELEPHONE ENCOUNTER
Upcoming Appts  No upcoming appointments  Last Office Visit - This Dept  10/11/2024 Jagdeep Bonner APRN    Uds 2/27/23

## 2025-04-17 DIAGNOSIS — F41.9 ANXIETY: ICD-10-CM

## 2025-04-18 RX ORDER — CLONAZEPAM 1 MG/1
1 TABLET ORAL 2 TIMES DAILY
Qty: 60 TABLET | Refills: 1 | OUTPATIENT
Start: 2025-04-18

## 2025-04-18 NOTE — TELEPHONE ENCOUNTER
Upcoming Appts  No upcoming appointments  Last Office Visit - This Dept  10/11/2024 Jagdeep Bonner APRN    UDS 4/11/2024

## 2025-04-22 ENCOUNTER — OFFICE VISIT (OUTPATIENT)
Dept: FAMILY MEDICINE CLINIC | Facility: CLINIC | Age: 75
End: 2025-04-22
Payer: MEDICARE

## 2025-04-22 ENCOUNTER — HOSPITAL ENCOUNTER (OUTPATIENT)
Dept: GENERAL RADIOLOGY | Facility: HOSPITAL | Age: 75
Discharge: HOME | End: 2025-04-22
Payer: MEDICARE

## 2025-04-22 VITALS
OXYGEN SATURATION: 100 % | BODY MASS INDEX: 25.03 KG/M2 | WEIGHT: 184.8 LBS | SYSTOLIC BLOOD PRESSURE: 138 MMHG | RESPIRATION RATE: 23 BRPM | HEART RATE: 56 BPM | TEMPERATURE: 97.3 F | HEIGHT: 72 IN | DIASTOLIC BLOOD PRESSURE: 88 MMHG

## 2025-04-22 DIAGNOSIS — R39.198 DIFFICULTY URINATING: Primary | ICD-10-CM

## 2025-04-22 DIAGNOSIS — R39.198 DIFFICULTY URINATING: ICD-10-CM

## 2025-04-22 DIAGNOSIS — K40.90 NON-RECURRENT UNILATERAL INGUINAL HERNIA WITHOUT OBSTRUCTION OR GANGRENE: ICD-10-CM

## 2025-04-22 LAB
BILIRUB BLD-MCNC: NEGATIVE MG/DL
CLARITY, POC: CLEAR
COLOR UR: YELLOW
EXPIRATION DATE: ABNORMAL
GLUCOSE UR STRIP-MCNC: NEGATIVE MG/DL
KETONES UR QL: NEGATIVE
LEUKOCYTE EST, POC: NEGATIVE
Lab: ABNORMAL
NITRITE UR-MCNC: NEGATIVE MG/ML
PH UR: 7 [PH] (ref 5–8)
PROT UR STRIP-MCNC: ABNORMAL MG/DL
RBC # UR STRIP: NEGATIVE /UL
SP GR UR: 1.01 (ref 1–1.03)
UROBILINOGEN UR QL: ABNORMAL

## 2025-04-22 PROCEDURE — 87086 URINE CULTURE/COLONY COUNT: CPT

## 2025-04-22 PROCEDURE — 74018 RADEX ABDOMEN 1 VIEW: CPT

## 2025-04-22 NOTE — PROGRESS NOTES
"Chief Complaint  No chief complaint on file.    Subjective      Jose Rios is a 74 y.o. male who presents to [facility]  with reports of lump in right groin that he noticed 3-4 days ago. He denies pain to groin area. He is also reporting that since he notices the area he has had some difficulty urinating. He can start urinating then begins to pass  flatulence then he can not finish urinating after he tries to have BM.       Objective   Vital Signs:   Vitals:    04/22/25 1301   BP: 138/88   Pulse: 56   Resp: 23   Temp: 97.3 °F (36.3 °C)   SpO2: 100%   Weight: 83.8 kg (184 lb 12.8 oz)   Height: 182.9 cm (72.01\")     Body mass index is 25.06 kg/m².    Wt Readings from Last 3 Encounters:   04/22/25 83.8 kg (184 lb 12.8 oz)   10/28/24 85.6 kg (188 lb 12.8 oz)   10/11/24 85.3 kg (188 lb)     BP Readings from Last 3 Encounters:   04/22/25 138/88   10/11/24 142/70   05/10/24 132/68       Health Maintenance   Topic Date Due    LUNG CANCER SCREENING  08/11/2024    LIPID PANEL  05/10/2025    COVID-19 Vaccine (5 - 2024-25 season) 05/06/2025 (Originally 9/1/2024)    Pneumococcal Vaccine 50+ (3 of 3 - PCV20 or PCV21) 07/22/2025 (Originally 7/29/2024)    INFLUENZA VACCINE  07/01/2025    ANNUAL WELLNESS VISIT  10/11/2025    TDAP/TD VACCINES (2 - Td or Tdap) 06/07/2027    COLORECTAL CANCER SCREENING  01/04/2032    HEPATITIS C SCREENING  Completed    AAA SCREEN ONCE  Completed    ZOSTER VACCINE  Completed       Physical Exam  Vitals and nursing note reviewed.   HENT:      Head: Normocephalic and atraumatic.   Eyes:      Conjunctiva/sclera: Conjunctivae normal.   Cardiovascular:      Rate and Rhythm: Normal rate and regular rhythm.      Pulses: Normal pulses.      Heart sounds: Normal heart sounds.   Pulmonary:      Effort: Pulmonary effort is normal.      Breath sounds: Normal breath sounds.   Abdominal:      General: Bowel sounds are normal.      Tenderness: There is no abdominal tenderness. " There is no right CVA tenderness, left CVA tenderness, guarding or rebound.      Hernia: A hernia is present. Hernia is present in the right inguinal area.       Musculoskeletal:         General: Normal range of motion.      Cervical back: Normal range of motion.   Skin:     General: Skin is warm and dry.   Neurological:      Mental Status: He is alert and oriented to person, place, and time.   Psychiatric:         Mood and Affect: Mood normal.         Behavior: Behavior normal.          Result Review :  The following data was reviewed by: CHRIS Gallegos on 04/22/2025:         Procedures          ASSESSMENT/PLAN  Diagnoses and all orders for this visit:    1. Difficulty urinating (Primary)  Comments:  UA ordered. Will notify patient of results and treat accordingly.  Orders:  -     XR Abdomen KUB; Future  -     Cancel: Urine Culture - Urine, Urine, Clean Catch; Future  -     Cancel: Urinalysis With Microscopic - Urine, Clean Catch; Future  -     Urine Culture - Urine, Urine, Clean Catch  -     POCT urinalysis dipstick, automated    2. Non-recurrent unilateral inguinal hernia without obstruction or gangrene  Comments:  Surgical consult. KUB and UA ordered. Will notify patient of results and treat accordingly.  Orders:  -     Ambulatory Referral to General Surgery                Jose Rios  reports that he quit smoking about 8 years ago. His smoking use included cigarettes. He started smoking about 58 years ago. He has a 50 pack-year smoking history. He has never used smokeless tobacco.              FOLLOW UP  Return for Next scheduled follow up.  Patient was given instructions and counseling regarding his condition or for health maintenance advice. Please see specific information pulled into the AVS if appropriate.       CHRIS Gallegos  04/22/25  15:58 EDT

## 2025-04-23 LAB — BACTERIA SPEC AEROBE CULT: NO GROWTH

## 2025-04-24 DIAGNOSIS — F41.9 ANXIETY: ICD-10-CM

## 2025-04-24 RX ORDER — CLONAZEPAM 1 MG/1
1 TABLET ORAL 2 TIMES DAILY
Qty: 60 TABLET | Refills: 0 | Status: SHIPPED | OUTPATIENT
Start: 2025-04-24

## 2025-04-24 NOTE — TELEPHONE ENCOUNTER
Upcoming Appts  No upcoming appointments  Last Office Visit - This Dept  4/22/2025 Bettye Moralez, APRN

## 2025-05-28 DIAGNOSIS — F41.9 ANXIETY: ICD-10-CM

## 2025-05-28 RX ORDER — CLONAZEPAM 1 MG/1
1 TABLET ORAL 2 TIMES DAILY
Qty: 60 TABLET | Refills: 0 | OUTPATIENT
Start: 2025-05-28

## 2025-05-28 NOTE — TELEPHONE ENCOUNTER
Upcoming Appts  No upcoming appointments  Last Office Visit - This Dept  4/22/2025 Bettye Moralez APRN    UDS4/11/24

## 2025-06-03 ENCOUNTER — OFFICE VISIT (OUTPATIENT)
Dept: SURGERY | Facility: CLINIC | Age: 75
End: 2025-06-03
Payer: MEDICARE

## 2025-06-03 VITALS
SYSTOLIC BLOOD PRESSURE: 156 MMHG | DIASTOLIC BLOOD PRESSURE: 89 MMHG | HEIGHT: 72 IN | HEART RATE: 54 BPM | WEIGHT: 185.9 LBS | BODY MASS INDEX: 25.18 KG/M2

## 2025-06-03 DIAGNOSIS — F41.9 ANXIETY: ICD-10-CM

## 2025-06-03 DIAGNOSIS — K40.90 RIGHT INGUINAL HERNIA: Primary | ICD-10-CM

## 2025-06-03 DIAGNOSIS — R25.1 TREMOR: ICD-10-CM

## 2025-06-03 RX ORDER — PRIMIDONE 50 MG/1
50 TABLET ORAL 3 TIMES DAILY
Qty: 270 TABLET | Refills: 1 | Status: SHIPPED | OUTPATIENT
Start: 2025-06-03

## 2025-06-03 RX ORDER — BUPROPION HYDROCHLORIDE 300 MG/1
300 TABLET ORAL DAILY
Qty: 30 TABLET | Refills: 1 | Status: SHIPPED | OUTPATIENT
Start: 2025-06-03

## 2025-06-03 NOTE — LETTER
Mariana 3, 2025     CHRIS Gallegos  [address] [place] [address]    Patient: Jose Rios   YOB: 1950   Date of Visit: 6/3/2025       Jose Wen.     Thank you for referring Jose Rios to me for evaluation.  I saw the patient in my office today.  Please see the assessment and plan section at the bottom of my note included below for feedback.  Feel free to call me on my cell phone at [phone] with any questions.  Thank you.    Sincerely,  Parviz Ash        CC: No Recipients    Parviz Ash MD  06/03/25 1011  Sign when Signing Visit  Chief Complaint:  unilateral iguinal hernia    Primary Care Provider: Jagdeep Bonner APRN    Referring Provider: Bettye Moralez APRN    History of Present Illness  Jose Rios is a 74 y.o. male referred by CHRIS Gallegos for an inguinal hernia.  The patient has a small bulge at his right inguinal area.  He is not have any pain or discomfort at the area.  He wanted to talk to a surgeon to see if surgery is needed.  He does not want to have surgery unless absolutely necessary.  Patient uses a cane to help with walking.  He has very bad arthritis of his back.  He does not do much lifting.  No abdominal surgeries.    Allergies: Penicillins    Outpatient Medications Marked as Taking for the 6/3/25 encounter (Office Visit) with Parviz Ash MD   Medication Sig Dispense Refill   • albuterol sulfate  (90 Base) MCG/ACT inhaler Inhale 2 puffs Every 4 (Four) Hours As Needed for Wheezing or Shortness of Air. 8.5 g 10   • amLODIPine (NORVASC) 10 MG tablet TAKE ONE TABLET BY MOUTH EVERY DAY 90 tablet 1   • atorvastatin (LIPITOR) 10 MG tablet TAKE 1 TABLET BY MOUTH DAILY. 90 tablet 1   • buPROPion XL (WELLBUTRIN XL) 300 MG 24 hr tablet TAKE ONE TABLET BY MOUTH EVERY DAY 30 tablet 1   • clonazePAM (KlonoPIN) 1 MG tablet Take 1 tablet by mouth 2 (Two) Times a Day. 60 tablet 0   • Diclofenac Sodium (VOLTAREN) 1 % gel gel Apply   topically to the appropriate area as directed 2 (Two) Times a Day. 150 g 2   • losartan (COZAAR) 50 MG tablet Take 1 tablet by mouth 2 (Two) Times a Day. 180 tablet 1   • metoprolol succinate XL (TOPROL-XL) 200 MG 24 hr tablet TAKE ONE TABLET BY MOUTH EVERY DAY 90 tablet 1   • oxyCODONE-acetaminophen (PERCOCET)  MG per tablet Take 1 tablet by mouth.     • primidone (MYSOLINE) 50 MG tablet Take 1 tablet by mouth 3 (Three) Times a Day. 270 tablet 1       Past Medical History:   • Acid reflux disease   • Anemia   • Anxiety   • Anxiety disorder    HE STATES THAT HIS ANXIETY HAS BEEN WORSE. HE HAS GONE TO [facility] IN THE PAST AND TRIED SSRI'S W/O MUCH BENEFIT. HE STATES THAT KLONOPIN HELPED, BUT STOPPED GOING AND THEN STOPPED KLONOPIN. RESTART KLONOPIN.    • Arthritis    GENERALIZED  R KNEE   • Asthma   • Bipolar affect, depressed   • Bipolar disorder   • Cervical radiculopathy   • Cervical spinal stenosis   • Cervicalgia   • Chronic bronchitis   • Chronic pain of both knees   • CKD (chronic kidney disease) stage 3, GFR 30-59 ml/min   • COPD (chronic obstructive pulmonary disease)   • COPD (chronic obstructive pulmonary disease)    USES INHALERS   • Depression   • Esophageal reflux   • Essential hypertension   • Folic acid deficiency   • Foraminal stenosis of cervical region   • Gastric reflux   • Head injury   • Hemorrhoids    RECTAL PROB (ACID REFLUX)   • Herniated disc, cervical    C5-6, RIGHT   • Hyperlipidemia   • Hypertension   • Hypertension    CONTROLLED WITH MEDS   • Hyponatremia   • Limb swelling   • Lung disease   • Major depressive disorder   • Mediastinal adenopathy   • Migraine   • MVP (mitral valve prolapse)   • JIM (obstructive sleep apnea)   • Osteoarthritis   • Pneumonia   • RLS (restless legs syndrome)   • Shortness of breath   • Slurred speech   • SOB (shortness of breath)   • Tremor of right hand   • Ulnar neuritis    CLINICAL        Past Surgical History:   • APPENDECTOMY   • APPENDECTOMY  "   45-50 YRS AGO   • BACK SURGERY   • CERVICAL DISC SURGERY    INTERVERTEBRAL; C5-6 FUSION   • CERVICAL FUSION    C5-6, C6-7   • COLONOSCOPY    Procedure: COLONOSCOPY;  Surgeon: Parviz Ash MD;  Location: [facility] ENDOSCOPY;  Service: General;  Laterality: N/A;   • JOINT REPLACEMENT    KNEE RIGHT   • NERVE SURGERY    LEFT ARM   • ORTHOPEDIC SURGERY    MIDDLE TRIGGER FINGER SX rtk)   • OTHER SURGICAL HISTORY    ARM SURGERY-NERVE DAMAGE REPAIR LEFT ARM    • SPINE SURGERY    SURGERY ON C-5-C6 IN  AND )       Family History:   Family History   Problem Relation Age of Onset   • Heart disease Mother    • Stroke Mother    • Arthritis Mother    • Heart disease Father    • Heart disease Maternal Grandfather    • Cancer Brother    • Malig Hyperthermia Neg Hx         Social History:  Social History     Tobacco Use   • Smoking status: Former     Current packs/day: 0.00     Average packs/day: 1 pack/day for 50.0 years (50.0 ttl pk-yrs)     Types: Cigarettes     Start date: 1966     Quit date: 2016     Years since quittin.8     Passive exposure: Past   • Smokeless tobacco: Never   Substance Use Topics   • Alcohol use: Yes     Comment: 1 or 2 a amonth        Objective    Vital Signs:  /89 (BP Location: Right arm, Patient Position: Sitting, Cuff Size: Adult)   Pulse 54   Ht 182.9 cm (72.01\")   Wt 84.3 kg (185 lb 14.4 oz)   BMI 25.21 kg/m²   Respiratory:  breathing not labored, respiratory effort appears normal  Cardiovascular:  heart regular rate  Abdomen:  soft, nontender, nondistended.  Small easily reducible bulge of the right inguinal area.  Wife present    Assessment:  Diagnoses and all orders for this visit:    1. Right inguinal hernia (Primary)        Plan:  Inguinal hernia diagnosis discussed.  Patient does not have any pain or problems from the hernia, he is not very active, and he does not want to have surgery.  The symptoms that would be concerning for incarceration were discussed.  For " this situation, it is reasonable to proceed with watchful observation.  Patient knows to seek medical attention promptly if he develops any significant change at the location of the hernia.  Patient will see me PRN.    Parviz Ash MD  06/03/2025    Electronically signed by Parviz Ash MD, 06/03/25, 10:10 AM EDT.

## 2025-06-03 NOTE — PROGRESS NOTES
Chief Complaint:  unilateral iguinal hernia    Primary Care Provider: Jagdeep Bonner APRN    Referring Provider: Bettye Moralez APRN    History of Present Illness  Jose Rios is a 74 y.o. male referred by CHRIS Gallegos for an inguinal hernia.  The patient has a small bulge at his right inguinal area.  He is not have any pain or discomfort at the area.  He wanted to talk to a surgeon to see if surgery is needed.  He does not want to have surgery unless absolutely necessary.  Patient uses a cane to help with walking.  He has very bad arthritis of his back.  He does not do much lifting.  No abdominal surgeries.    Allergies: Penicillins    Outpatient Medications Marked as Taking for the 6/3/25 encounter (Office Visit) with Parviz Ash MD   Medication Sig Dispense Refill    albuterol sulfate  (90 Base) MCG/ACT inhaler Inhale 2 puffs Every 4 (Four) Hours As Needed for Wheezing or Shortness of Air. 8.5 g 10    amLODIPine (NORVASC) 10 MG tablet TAKE ONE TABLET BY MOUTH EVERY DAY 90 tablet 1    atorvastatin (LIPITOR) 10 MG tablet TAKE 1 TABLET BY MOUTH DAILY. 90 tablet 1    buPROPion XL (WELLBUTRIN XL) 300 MG 24 hr tablet TAKE ONE TABLET BY MOUTH EVERY DAY 30 tablet 1    clonazePAM (KlonoPIN) 1 MG tablet Take 1 tablet by mouth 2 (Two) Times a Day. 60 tablet 0    Diclofenac Sodium (VOLTAREN) 1 % gel gel Apply  topically to the appropriate area as directed 2 (Two) Times a Day. 150 g 2    losartan (COZAAR) 50 MG tablet Take 1 tablet by mouth 2 (Two) Times a Day. 180 tablet 1    metoprolol succinate XL (TOPROL-XL) 200 MG 24 hr tablet TAKE ONE TABLET BY MOUTH EVERY DAY 90 tablet 1    oxyCODONE-acetaminophen (PERCOCET)  MG per tablet Take 1 tablet by mouth.      primidone (MYSOLINE) 50 MG tablet Take 1 tablet by mouth 3 (Three) Times a Day. 270 tablet 1       Past Medical History:    Acid reflux disease    Anemia    Anxiety    Anxiety disorder    HE STATES THAT HIS ANXIETY HAS BEEN WORSE. HE  HAS GONE TO [facility] IN THE PAST AND TRIED SSRI'S W/O MUCH BENEFIT. HE STATES THAT KLONOPIN HELPED, BUT STOPPED GOING AND THEN STOPPED KLONOPIN. RESTART KLONOPIN.     Arthritis    GENERALIZED  R KNEE    Asthma    Bipolar affect, depressed    Bipolar disorder    Cervical radiculopathy    Cervical spinal stenosis    Cervicalgia    Chronic bronchitis    Chronic pain of both knees    CKD (chronic kidney disease) stage 3, GFR 30-59 ml/min    COPD (chronic obstructive pulmonary disease)    COPD (chronic obstructive pulmonary disease)    USES INHALERS    Depression    Esophageal reflux    Essential hypertension    Folic acid deficiency    Foraminal stenosis of cervical region    Gastric reflux    Head injury    Hemorrhoids    RECTAL PROB (ACID REFLUX)    Herniated disc, cervical    C5-6, RIGHT    Hyperlipidemia    Hypertension    Hypertension    CONTROLLED WITH MEDS    Hyponatremia    Limb swelling    Lung disease    Major depressive disorder    Mediastinal adenopathy    Migraine    MVP (mitral valve prolapse)    JIM (obstructive sleep apnea)    Osteoarthritis    Pneumonia    RLS (restless legs syndrome)    Shortness of breath    Slurred speech    SOB (shortness of breath)    Tremor of right hand    Ulnar neuritis    CLINICAL        Past Surgical History:    APPENDECTOMY    APPENDECTOMY    45-50 YRS AGO    BACK SURGERY    CERVICAL DISC SURGERY    INTERVERTEBRAL; C5-6 FUSION    CERVICAL FUSION    C5-6, C6-7    COLONOSCOPY    Procedure: COLONOSCOPY;  Surgeon: Parviz Ash MD;  Location: [facility] ENDOSCOPY;  Service: General;  Laterality: N/A;    JOINT REPLACEMENT    KNEE RIGHT    NERVE SURGERY    LEFT ARM    ORTHOPEDIC SURGERY    MIDDLE TRIGGER FINGER SX rtk)    OTHER SURGICAL HISTORY    ARM SURGERY-NERVE DAMAGE REPAIR LEFT ARM     SPINE SURGERY    SURGERY ON C-5-C6 IN 1996 AND 1999)       Family History:   Family History   Problem Relation Age of Onset    Heart disease Mother     Stroke Mother     Arthritis Mother      "Heart disease Father     Heart disease Maternal Grandfather     Cancer Brother     Malig Hyperthermia Neg Hx         Social History:  Social History     Tobacco Use    Smoking status: Former     Current packs/day: 0.00     Average packs/day: 1 pack/day for 50.0 years (50.0 ttl pk-yrs)     Types: Cigarettes     Start date: 1966     Quit date: 2016     Years since quittin.8     Passive exposure: Past    Smokeless tobacco: Never   Substance Use Topics    Alcohol use: Yes     Comment: 1 or 2 a amonth        Objective     Vital Signs:  /89 (BP Location: Right arm, Patient Position: Sitting, Cuff Size: Adult)   Pulse 54   Ht 182.9 cm (72.01\")   Wt 84.3 kg (185 lb 14.4 oz)   BMI 25.21 kg/m²   Respiratory:  breathing not labored, respiratory effort appears normal  Cardiovascular:  heart regular rate  Abdomen:  soft, nontender, nondistended.  Small easily reducible bulge of the right inguinal area.  Wife present    Assessment:  Diagnoses and all orders for this visit:    1. Right inguinal hernia (Primary)        Plan:  Inguinal hernia diagnosis discussed.  Patient does not have any pain or problems from the hernia, he is not very active, and he does not want to have surgery.  The symptoms that would be concerning for incarceration were discussed.  For this situation, it is reasonable to proceed with watchful observation.  Patient knows to seek medical attention promptly if he develops any significant change at the location of the hernia.  Patient will see me PRN.    Parviz Ahs MD  2025    Electronically signed by Parviz Ash MD, 25, 10:10 AM EDT.          "

## 2025-06-03 NOTE — TELEPHONE ENCOUNTER
Upcoming Appts  With Family Medicine (CHRIS Toussaint)  06/18/2025 at 8:00 AM  Last Office Visit - This Dept  4/22/2025 Bettye Moralez APRN

## 2025-06-05 ENCOUNTER — CLINICAL SUPPORT (OUTPATIENT)
Dept: FAMILY MEDICINE CLINIC | Facility: CLINIC | Age: 75
End: 2025-06-05
Payer: MEDICARE

## 2025-06-05 DIAGNOSIS — Z51.81 MEDICATION MONITORING ENCOUNTER: Primary | ICD-10-CM

## 2025-06-05 LAB
AMPHET+METHAMPHET UR QL: NEGATIVE
AMPHETAMINE INTERNAL CONTROL: ABNORMAL
AMPHETAMINES UR QL: NEGATIVE
BARBITURATE INTERNAL CONTROL: ABNORMAL
BARBITURATES UR QL SCN: NEGATIVE
BENZODIAZ UR QL SCN: NEGATIVE
BENZODIAZEPINE INTERNAL CONTROL: ABNORMAL
BUPRENORPHINE INTERNAL CONTROL: ABNORMAL
BUPRENORPHINE SERPL-MCNC: NEGATIVE NG/ML
CANNABINOIDS SERPL QL: NEGATIVE
COCAINE INTERNAL CONTROL: ABNORMAL
COCAINE UR QL: NEGATIVE
EXPIRATION DATE: ABNORMAL
Lab: ABNORMAL
MDMA (ECSTASY) INTERNAL CONTROL: ABNORMAL
MDMA UR QL SCN: NEGATIVE
METHADONE INTERNAL CONTROL: ABNORMAL
METHADONE UR QL SCN: NEGATIVE
METHAMPHETAMINE INTERNAL CONTROL: ABNORMAL
MORPHINE INTERNAL CONTROL: ABNORMAL
MORPHINE/OPIATES SCREEN, URINE: NEGATIVE
OXYCODONE INTERNAL CONTROL: ABNORMAL
OXYCODONE UR QL SCN: POSITIVE
PCP UR QL SCN: NEGATIVE
PHENCYCLIDINE INTERNAL CONTROL: ABNORMAL
THC INTERNAL CONTROL: ABNORMAL

## 2025-06-06 DIAGNOSIS — F41.9 ANXIETY: ICD-10-CM

## 2025-06-06 RX ORDER — CLONAZEPAM 1 MG/1
1 TABLET ORAL 2 TIMES DAILY
Qty: 60 TABLET | Refills: 0 | Status: SHIPPED | OUTPATIENT
Start: 2025-06-06

## 2025-06-17 ENCOUNTER — TELEPHONE (OUTPATIENT)
Dept: FAMILY MEDICINE CLINIC | Facility: CLINIC | Age: 75
End: 2025-06-17
Payer: MEDICARE

## 2025-06-17 NOTE — TELEPHONE ENCOUNTER
[facility]   Confirming appointment with Jagdeep Bonner on 6/18/25.

## 2025-06-18 ENCOUNTER — TELEPHONE (OUTPATIENT)
Dept: FAMILY MEDICINE CLINIC | Facility: CLINIC | Age: 75
End: 2025-06-18

## 2025-06-18 NOTE — TELEPHONE ENCOUNTER
Relay  [facility]  Patient missed their appointment scheduled on 6/18 with Jagdeep Bonner.  Would patient like to be rescheduled?  No show letter sent to patient either via mychart or mail.

## 2025-06-27 DIAGNOSIS — F41.9 ANXIETY: ICD-10-CM

## 2025-06-27 NOTE — TELEPHONE ENCOUNTER
Upcoming Appts  With Family Medicine (CHRIS Toussaint)  07/25/2025 at 9:30 AM  Last Office Visit - This Dept  4/22/2025 Bettye Moralez APRN    Uds6/5/25

## 2025-06-27 NOTE — TELEPHONE ENCOUNTER
Patient is needing a refill on his medication.  He missed his follow up on 6/18 and called to reschedule but the soonest appointment I can find is July 25th so can he get a refill until his follow up appointment

## 2025-06-30 RX ORDER — CLONAZEPAM 1 MG/1
1 TABLET ORAL 2 TIMES DAILY
Qty: 60 TABLET | Refills: 0 | Status: SHIPPED | OUTPATIENT
Start: 2025-06-30 | End: 2025-07-02 | Stop reason: SDUPTHER

## 2025-07-02 DIAGNOSIS — F41.9 ANXIETY: ICD-10-CM

## 2025-07-02 RX ORDER — CLONAZEPAM 1 MG/1
1 TABLET ORAL 2 TIMES DAILY
Qty: 60 TABLET | Refills: 0 | Status: SHIPPED | OUTPATIENT
Start: 2025-07-02

## 2025-07-18 ENCOUNTER — TELEPHONE (OUTPATIENT)
Dept: FAMILY MEDICINE CLINIC | Facility: CLINIC | Age: 75
End: 2025-07-18
Payer: MEDICARE

## 2025-07-18 NOTE — TELEPHONE ENCOUNTER
We reviewed a visit because we would have to add another antidepressant or antianxiety to the clonazepam.  On the Wellbutrin he already takes.

## 2025-07-18 NOTE — TELEPHONE ENCOUNTER
Caller: Jose Rios    Relationship: Self    Best call back number: [phone]    Which medication are you concerned about: clonazePAM (KlonoPIN) 1 MG tablet     Who prescribed you this medication:  Jagdeep Bonner APRN     When did you start taking this medication: FEW YEARS     What are your concerns: PATIENT STATED THE PAST FEW WEEKS MEDICATION IS NOT WORKING WELL AND REQUESTING TO SEE IF PROVIDER COULD PRESCRIBED ANYTHING ELSE.

## 2025-07-24 ENCOUNTER — TELEPHONE (OUTPATIENT)
Dept: FAMILY MEDICINE CLINIC | Facility: CLINIC | Age: 75
End: 2025-07-24
Payer: MEDICARE

## 2025-07-25 ENCOUNTER — OFFICE VISIT (OUTPATIENT)
Dept: FAMILY MEDICINE CLINIC | Facility: CLINIC | Age: 75
End: 2025-07-25
Payer: MEDICARE

## 2025-07-25 VITALS
HEIGHT: 72 IN | WEIGHT: 184.8 LBS | SYSTOLIC BLOOD PRESSURE: 152 MMHG | OXYGEN SATURATION: 94 % | BODY MASS INDEX: 25.03 KG/M2 | DIASTOLIC BLOOD PRESSURE: 88 MMHG | HEART RATE: 69 BPM | TEMPERATURE: 97.4 F | RESPIRATION RATE: 18 BRPM

## 2025-07-25 DIAGNOSIS — J44.9 CHRONIC OBSTRUCTIVE PULMONARY DISEASE, UNSPECIFIED COPD TYPE: ICD-10-CM

## 2025-07-25 DIAGNOSIS — E78.2 MIXED HYPERLIPIDEMIA: ICD-10-CM

## 2025-07-25 DIAGNOSIS — R25.1 TREMOR: ICD-10-CM

## 2025-07-25 DIAGNOSIS — I10 ESSENTIAL HYPERTENSION: ICD-10-CM

## 2025-07-25 DIAGNOSIS — M47.816 LUMBAR SPONDYLOSIS: ICD-10-CM

## 2025-07-25 DIAGNOSIS — F41.9 ANXIETY: ICD-10-CM

## 2025-07-25 DIAGNOSIS — E55.9 VITAMIN D DEFICIENCY: Primary | ICD-10-CM

## 2025-07-25 PROCEDURE — 1160F RVW MEDS BY RX/DR IN RCRD: CPT | Performed by: NURSE PRACTITIONER

## 2025-07-25 PROCEDURE — 3077F SYST BP >= 140 MM HG: CPT | Performed by: NURSE PRACTITIONER

## 2025-07-25 PROCEDURE — 1159F MED LIST DOCD IN RCRD: CPT | Performed by: NURSE PRACTITIONER

## 2025-07-25 PROCEDURE — 3079F DIAST BP 80-89 MM HG: CPT | Performed by: NURSE PRACTITIONER

## 2025-07-25 PROCEDURE — G0439 PPPS, SUBSEQ VISIT: HCPCS | Performed by: NURSE PRACTITIONER

## 2025-07-25 PROCEDURE — 1125F AMNT PAIN NOTED PAIN PRSNT: CPT | Performed by: NURSE PRACTITIONER

## 2025-07-25 PROCEDURE — 99214 OFFICE O/P EST MOD 30 MIN: CPT | Performed by: NURSE PRACTITIONER

## 2025-07-25 PROCEDURE — 1170F FXNL STATUS ASSESSED: CPT | Performed by: NURSE PRACTITIONER

## 2025-07-25 RX ORDER — BUPROPION HYDROCHLORIDE 300 MG/1
300 TABLET ORAL DAILY
Qty: 90 TABLET | Refills: 1 | Status: SHIPPED | OUTPATIENT
Start: 2025-07-25

## 2025-07-25 RX ORDER — PRIMIDONE 50 MG/1
50 TABLET ORAL 3 TIMES DAILY
Qty: 270 TABLET | Refills: 1 | Status: SHIPPED | OUTPATIENT
Start: 2025-07-25

## 2025-07-25 RX ORDER — CLONAZEPAM 1 MG/1
1 TABLET ORAL 2 TIMES DAILY
Qty: 60 TABLET | Refills: 0 | Status: SHIPPED | OUTPATIENT
Start: 2025-07-25

## 2025-07-25 RX ORDER — ESCITALOPRAM OXALATE 5 MG/1
5 TABLET ORAL DAILY
Qty: 30 TABLET | Refills: 1 | Status: SHIPPED | OUTPATIENT
Start: 2025-07-25

## 2025-07-25 RX ORDER — AMLODIPINE BESYLATE 10 MG/1
10 TABLET ORAL DAILY
Qty: 90 TABLET | Refills: 1 | Status: SHIPPED | OUTPATIENT
Start: 2025-07-25

## 2025-07-25 RX ORDER — ATORVASTATIN CALCIUM 10 MG/1
10 TABLET, FILM COATED ORAL DAILY
Qty: 90 TABLET | Refills: 1 | Status: SHIPPED | OUTPATIENT
Start: 2025-07-25

## 2025-07-25 RX ORDER — ALBUTEROL SULFATE 90 UG/1
2 INHALANT RESPIRATORY (INHALATION) EVERY 4 HOURS PRN
Qty: 8.5 G | Refills: 10 | Status: SHIPPED | OUTPATIENT
Start: 2025-07-25

## 2025-07-25 RX ORDER — LOSARTAN POTASSIUM 50 MG/1
50 TABLET ORAL 2 TIMES DAILY
Qty: 180 TABLET | Refills: 1 | Status: SHIPPED | OUTPATIENT
Start: 2025-07-25

## 2025-07-25 RX ORDER — METOPROLOL SUCCINATE 200 MG/1
200 TABLET, EXTENDED RELEASE ORAL DAILY
Qty: 90 TABLET | Refills: 1 | Status: SHIPPED | OUTPATIENT
Start: 2025-07-25

## 2025-07-25 NOTE — ASSESSMENT & PLAN NOTE
{Hypertension is (optional):1446241484}    Orders:    amLODIPine (NORVASC) 10 MG tablet; Take 1 tablet by mouth Daily.    losartan (COZAAR) 50 MG tablet; Take 1 tablet by mouth 2 (Two) Times a Day.    metoprolol succinate XL (TOPROL-XL) 200 MG 24 hr tablet; Take 1 tablet by mouth Daily.    Lipid Panel With / Chol / HDL Ratio; Future    Comprehensive Metabolic Panel; Future    CBC (No Diff); Future    Urinalysis With Culture If Indicated -; Future

## 2025-07-25 NOTE — ASSESSMENT & PLAN NOTE
{Hyperlipidemia A/P Block (Optional):7140272630}    Orders:    atorvastatin (LIPITOR) 10 MG tablet; Take 1 tablet by mouth Daily.

## 2025-07-25 NOTE — ASSESSMENT & PLAN NOTE
{Hypertension is (optional):9524144552}    Orders:    amLODIPine (NORVASC) 10 MG tablet; Take 1 tablet by mouth Daily.    losartan (COZAAR) 50 MG tablet; Take 1 tablet by mouth 2 (Two) Times a Day.    metoprolol succinate XL (TOPROL-XL) 200 MG 24 hr tablet; Take 1 tablet by mouth Daily.    Lipid Panel With / Chol / HDL Ratio; Future    Comprehensive Metabolic Panel; Future    CBC (No Diff); Future    Urinalysis With Culture If Indicated -; Future

## 2025-07-25 NOTE — ASSESSMENT & PLAN NOTE
Orders:    Diclofenac Sodium (VOLTAREN) 1 % gel gel; Apply  topically to the appropriate area as directed 2 (Two) Times a Day.

## 2025-07-25 NOTE — ASSESSMENT & PLAN NOTE
Orders:    Vitamin D,25-Hydroxy; Future  orthostatics were positive needs to make sure drinkin plenty a day.

## 2025-07-25 NOTE — ASSESSMENT & PLAN NOTE
{COPD A/P Block (Optional):48831}    Orders:    albuterol sulfate  (90 Base) MCG/ACT inhaler; Inhale 2 puffs Every 4 (Four) Hours As Needed for Wheezing or Shortness of Air.

## 2025-07-25 NOTE — PROGRESS NOTES
Subjective   The ABCs of the Annual Wellness Visit  Medicare Wellness Visit      Jose Rios is a 74 y.o. patient who presents for a Medicare Wellness Visit.    The following portions of the patient's history were reviewed and   updated as appropriate: allergies, current medications, past family history, past medical history, past social history, past surgical history, and problem list.    Compared to one year ago, the patient's physical   health is worse.  Compared to one year ago, the patient's mental   health is worse.    Recent Hospitalizations:  He was not admitted to the hospital during the last year.     Current Medical Providers:  Patient Care Team:  Jagdeep oBnner APRN as PCP - General (Nurse Practitioner)  Rufino Weir DO as Consulting Physician (Pulmonary Disease)  Yamil Colin MD as Consulting Physician (Neurology)  Tayla Edward PA as Physician Assistant (Neurosurgery)  González Zapata MD as Surgeon (Orthopedic Surgery)  Gina Adams (Pain Medicine)  Pastor, April, APRN as Nurse Practitioner (Nurse Practitioner)    Outpatient Medications Prior to Visit   Medication Sig Dispense Refill    albuterol sulfate  (90 Base) MCG/ACT inhaler Inhale 2 puffs Every 4 (Four) Hours As Needed for Wheezing or Shortness of Air. 8.5 g 10    amLODIPine (NORVASC) 10 MG tablet TAKE ONE TABLET BY MOUTH EVERY DAY 90 tablet 1    atorvastatin (LIPITOR) 10 MG tablet TAKE 1 TABLET BY MOUTH DAILY. 90 tablet 1    buPROPion XL (WELLBUTRIN XL) 300 MG 24 hr tablet TAKE ONE TABLET BY MOUTH EVERY DAY 30 tablet 1    clonazePAM (KlonoPIN) 1 MG tablet Take 1 tablet by mouth 2 (Two) Times a Day. 60 tablet 0    Diclofenac Sodium (VOLTAREN) 1 % gel gel Apply  topically to the appropriate area as directed 2 (Two) Times a Day. 150 g 2    losartan (COZAAR) 50 MG tablet Take 1 tablet by mouth 2 (Two) Times a Day. 180 tablet 1    metoprolol succinate XL (TOPROL-XL) 200 MG 24 hr tablet TAKE ONE TABLET  "BY MOUTH EVERY DAY 90 tablet 1    primidone (MYSOLINE) 50 MG tablet TAKE ONE TABLET BY MOUTH THREE TIMES A  tablet 1    oxyCODONE-acetaminophen (PERCOCET)  MG per tablet Take 1 tablet by mouth.      pantoprazole (PROTONIX) 40 MG EC tablet TAKE ONE TABLET BY MOUTH EVERY DAY (Patient not taking: Reported on 6/3/2025) 90 tablet 1    Turmeric 500 MG capsule Take 2 capsules by mouth Daily. (Patient not taking: Reported on 6/3/2025)       No facility-administered medications prior to visit.     No opioid medication identified on active medication list. I have reviewed chart for other potential  high risk medication/s and harmful drug interactions in the elderly.      Aspirin is not on active medication list.  Aspirin use is not indicated based on review of current medical condition/s. Risk of harm outweighs potential benefits.  .    Patient Active Problem List   Diagnosis    Anemia    Anxiety disorder    Chronic obstructive pulmonary disease    CKD (chronic kidney disease) stage 3, GFR 30-59 ml/min    Essential hypertension    Hyperlipidemia    Hyponatremia    Major depressive disorder    MVP (mitral valve prolapse)    JIM (obstructive sleep apnea)    Osteoarthritis    Depression    Centrilobular emphysema    DDD (degenerative disc disease), cervical    Spondylosis without myelopathy    Tremor    Vitamin D deficiency    Tobacco abuse, in remission    Chronic dyspnea    Lumbar spondylosis    Overweight     Advance Care Planning Advance Directive is not on file.  ACP discussion was held with the patient during this visit. Patient does not have an advance directive, declines further assistance.            Objective   Vitals:    07/25/25 0927   BP: 152/88   BP Location: Right arm   Patient Position: Sitting   Cuff Size: Adult   Pulse: 69   Resp: 18   Temp: 97.4 °F (36.3 °C)   TempSrc: Temporal   SpO2: 94%   Weight: 83.8 kg (184 lb 12.8 oz)   Height: 182.9 cm (72.01\")   PainSc: 7    PainLoc: Back       Estimated " "body mass index is 25.06 kg/m² as calculated from the following:    Height as of this encounter: 182.9 cm (72.01\").    Weight as of this encounter: 83.8 kg (184 lb 12.8 oz).         Gait and Balance Evaluation:  Steadying Self on Sánchez        Does the patient have evidence of cognitive impairment? No                                                                                                Health  Risk Assessment    Smoking Status:  Social History     Tobacco Use   Smoking Status Former    Current packs/day: 0.00    Average packs/day: 1 pack/day for 50.0 years (50.0 ttl pk-yrs)    Types: Cigarettes    Start date: 1966    Quit date: 2016    Years since quittin.9    Passive exposure: Past   Smokeless Tobacco Never     Alcohol Consumption:  Social History     Substance and Sexual Activity   Alcohol Use Yes    Comment: 1 or 2 a [facility]        Fall Risk Screen  [facility] [email or web address] Fall Risk Assessment was completed, and patient is at MODERATE risk for falls. Assessment completed on:2025    Depression Screening   Little interest or pleasure in doing things? Nearly every day   Feeling down, depressed, or hopeless? Several days   PHQ-2 Total Score 4   Trouble falling or staying asleep, or sleeping too much? Nearly every day   Feeling tired or having little energy? Nearly every day   Poor appetite or overeating? Nearly every day   Feeling bad about yourself - or that you are a failure or have let yourself or your family down? Not at all   Trouble concentrating on things, such as reading the newspaper or watching television? Not at all   Moving or speaking so slowly that other people could have noticed? Or the opposite - being so fidgety or restless that you have been moving around a lot more than usual? Not at all     Thoughts that you would be better off dead, or of hurting yourself in some way? Not at all   PHQ-9 Total Score 13   If you checked off any problems, how difficult have these problems made it for you to do " your work, take care of things at home, or get along with other people? Extremely difficult        Health Habits and Functional and Cognitive Screenin/25/2025     9:33 AM   Functional & Cognitive Status   Do you have difficulty preparing food and eating? No   Do you have difficulty bathing yourself, getting dressed or grooming yourself? No   Do you have difficulty using the toilet? No   Do you have difficulty moving around from place to place? Yes   Do you have trouble with steps or getting out of a bed or a chair? Yes   Current Diet Unhealthy Diet   Dental Exam Not up to date   Eye Exam Not up to date   Exercise (times per week) 0 times per week   Current Exercises Include No Regular Exercise   Do you need help using the phone?  No   Are you deaf or do you have serious difficulty hearing?  Yes   Do you need help to go to places out of walking distance? Yes   Do you need help shopping? Yes   Do you need help preparing meals?  No   Do you need help with housework?  Yes   Do you need help with laundry? Yes   Do you need help taking your medications? No   Do you need help managing money? No   Do you ever drive or ride in a car without wearing a seat belt? No   Have you felt unusual fatigue (could be tiredness), stress, anger or loneliness in the last month? No   Who do you live with? Spouse   If you need help, do you have trouble finding someone available to you? No   Have you been bothered in the last four weeks by sexual problems? No   Do you have difficulty concentrating, remembering or making decisions? Yes           Age-appropriate Screening Schedule:  Refer to the list below for future screening recommendations based on patient's age, sex and/or medical conditions. Orders for these recommended tests are listed in the plan section. The patient has been provided with a written plan.    Health Maintenance List  Health Maintenance   Topic Date Due    LUNG CANCER SCREENING  2024    LIPID PANEL   05/10/2025    Pneumococcal Vaccine 50+ (3 of 3 - PCV20 or PCV21) 01/21/2026 (Originally 7/29/2024)    COVID-19 Vaccine (5 - 2024-25 season) 07/25/2026 (Originally 9/1/2024)    INFLUENZA VACCINE  10/01/2025    ANNUAL WELLNESS VISIT  07/25/2026    TDAP/TD VACCINES (2 - Td or Tdap) 06/07/2027    COLORECTAL CANCER SCREENING  01/04/2032    HEPATITIS C SCREENING  Completed    AAA SCREEN ONCE  Completed    ZOSTER VACCINE  Completed                                                                                                                                                CMS Preventative Services Quick Reference  Risk Factors Identified During Encounter  None Identified    The above risks/problems have been discussed with the patient.  Pertinent information has been shared with the patient in the After Visit Summary.  An After Visit Summary and PPPS were made available to the patient.    Follow Up:   Next Medicare Wellness visit to be scheduled in 1 year.         Additional E&M Note during same encounter follows:  Patient has additional, significant, and separately identifiable condition(s)/problem(s) that require work above and beyond the Medicare Wellness Visit     Chief Complaint  Medicare Wellness-subsequent, Hypertension, Anxiety, and Fatigue (Doesn't feel like doing anything, doesn't feel like fixing food or eating)    Subjective   Hypertension:  152/88  elevated today.    Anxiety:  doing well on the clonazepam.  Not eating just doesn't want to do anything and is not sleeping either  bad the last couple of weeks.  Was on something else years ago.    Dizziness for the last few weeks mostly with standing up.  But sometimes when walking.    States doesn't use alcohol but rarely.  Aware doesn't go with benzodiazepines.    Hypertension  Associated symptoms: anxiety    Associated symptoms: no chest pain, no headaches and no shortness of breath    Anxiety    Symptoms: no chest pain and no shortness of breath   "  Fatigue  Symptoms: fatigue    Symptoms: no chest pain, no cough, no fever and no headaches          Review of Systems   Constitutional:  Positive for fatigue. Negative for fever.   Respiratory:  Negative for cough, chest tightness and shortness of breath.    Cardiovascular:  Negative for chest pain.              Objective   Vital Signs:  /88 (BP Location: Right arm, Patient Position: Sitting, Cuff Size: Adult)   Pulse 69   Temp 97.4 °F (36.3 °C) (Temporal)   Resp 18   Ht 182.9 cm (72.01\")   Wt 83.8 kg (184 lb 12.8 oz)   SpO2 94%   BMI 25.06 kg/m²   Physical Exam  Vitals reviewed.   Constitutional:       Appearance: Normal appearance. He is well-developed.   HENT:      Head: Normocephalic and atraumatic.      Mouth/Throat:      Pharynx: No oropharyngeal exudate.   Eyes:      Conjunctiva/sclera: Conjunctivae normal.      Pupils: Pupils are equal, round, and reactive to light.   Cardiovascular:      Rate and Rhythm: Normal rate and regular rhythm.      Heart sounds: Normal heart sounds. No murmur heard.     No friction rub. No gallop.   Pulmonary:      Effort: Pulmonary effort is normal.      Breath sounds: Normal breath sounds. No wheezing or rhonchi.   Skin:     General: Skin is warm and dry.   Neurological:      Mental Status: He is alert and oriented to person, place, and time.   Psychiatric:         Mood and Affect: Mood and affect normal.         Behavior: Behavior normal.         Thought Content: Thought content normal.         Judgment: Judgment normal.                    Assessment and Plan      Chronic obstructive pulmonary disease, unspecified COPD type      Orders:    albuterol sulfate  (90 Base) MCG/ACT inhaler; Inhale 2 puffs Every 4 (Four) Hours As Needed for Wheezing or Shortness of Air.    Essential hypertension      Orders:    amLODIPine (NORVASC) 10 MG tablet; Take 1 tablet by mouth Daily.    losartan (COZAAR) 50 MG tablet; Take 1 tablet by mouth 2 (Two) Times a Day.    " metoprolol succinate XL (TOPROL-XL) 200 MG 24 hr tablet; Take 1 tablet by mouth Daily.    Lipid Panel With / Chol / HDL Ratio; Future    Comprehensive Metabolic Panel; Future    CBC (No Diff); Future    Urinalysis With Culture If Indicated -; Future    Anxiety    Orders:    buPROPion XL (WELLBUTRIN XL) 300 MG 24 hr tablet; Take 1 tablet by mouth Daily.    clonazePAM (KlonoPIN) 1 MG tablet; Take 1 tablet by mouth 2 (Two) Times a Day.    escitalopram (Lexapro) 5 MG tablet; Take 1 tablet by mouth Daily.    Lumbar spondylosis    Orders:    Diclofenac Sodium (VOLTAREN) 1 % gel gel; Apply  topically to the appropriate area as directed 2 (Two) Times a Day.    Essential hypertension      Orders:    amLODIPine (NORVASC) 10 MG tablet; Take 1 tablet by mouth Daily.    losartan (COZAAR) 50 MG tablet; Take 1 tablet by mouth 2 (Two) Times a Day.    metoprolol succinate XL (TOPROL-XL) 200 MG 24 hr tablet; Take 1 tablet by mouth Daily.    Lipid Panel With / Chol / HDL Ratio; Future    Comprehensive Metabolic Panel; Future    CBC (No Diff); Future    Urinalysis With Culture If Indicated -; Future    Tremor    Orders:    primidone (MYSOLINE) 50 MG tablet; Take 1 tablet by mouth 3 (Three) Times a Day.    Mixed hyperlipidemia       Orders:    atorvastatin (LIPITOR) 10 MG tablet; Take 1 tablet by mouth Daily.    Vitamin D deficiency    Orders:    Vitamin D,25-Hydroxy; Future  orthostatics were positive needs to make sure drinkin plenty a day.          Follow Up   Return in about 1 month (around 8/25/2025).  Patient was given instructions and counseling regarding his condition or for health maintenance advice. Please see specific information pulled into the AVS if appropriate.

## 2025-08-27 ENCOUNTER — HOSPITAL ENCOUNTER (OUTPATIENT)
Dept: CT IMAGING | Facility: HOSPITAL | Age: 75
Discharge: HOME | End: 2025-08-27
Admitting: NURSE PRACTITIONER
Payer: MEDICARE

## 2025-08-27 DIAGNOSIS — Z12.2 SCREENING FOR LUNG CANCER: ICD-10-CM

## 2025-08-27 DIAGNOSIS — Z87.891 FORMER SMOKER: ICD-10-CM

## 2025-08-27 PROCEDURE — 71271 CT THORAX LUNG CANCER SCR C-: CPT

## (undated) DEVICE — Device: Brand: DEFENDO AIR/WATER/SUCTION AND BIOPSY VALVE

## (undated) DEVICE — SOL IRRG H2O PL/BG 1000ML STRL

## (undated) DEVICE — COLON KIT: Brand: MEDLINE INDUSTRIES, INC.